# Patient Record
Sex: FEMALE | Race: WHITE | NOT HISPANIC OR LATINO | Employment: OTHER | URBAN - METROPOLITAN AREA
[De-identification: names, ages, dates, MRNs, and addresses within clinical notes are randomized per-mention and may not be internally consistent; named-entity substitution may affect disease eponyms.]

---

## 2017-08-14 ENCOUNTER — TRANSCRIBE ORDERS (OUTPATIENT)
Dept: ADMINISTRATIVE | Facility: HOSPITAL | Age: 52
End: 2017-08-14

## 2017-08-14 DIAGNOSIS — Z12.31 ENCOUNTER FOR SCREENING MAMMOGRAM FOR MALIGNANT NEOPLASM OF BREAST: Primary | ICD-10-CM

## 2017-10-23 ENCOUNTER — HOSPITAL ENCOUNTER (OUTPATIENT)
Dept: MAMMOGRAPHY | Facility: MEDICAL CENTER | Age: 52
Discharge: HOME | End: 2017-10-23
Payer: COMMERCIAL

## 2017-10-23 DIAGNOSIS — Z12.31 ENCOUNTER FOR SCREENING MAMMOGRAM FOR MALIGNANT NEOPLASM OF BREAST: ICD-10-CM

## 2017-10-23 PROCEDURE — G0202 SCR MAMMO BI INCL CAD: HCPCS

## 2017-10-31 ENCOUNTER — ALLSCRIPTS OFFICE VISIT (OUTPATIENT)
Dept: OTHER | Facility: OTHER | Age: 52
End: 2017-10-31

## 2017-11-10 ENCOUNTER — LAB CONVERSION - ENCOUNTER (OUTPATIENT)
Dept: OTHER | Facility: OTHER | Age: 52
End: 2017-11-10

## 2017-11-10 ENCOUNTER — GENERIC CONVERSION - ENCOUNTER (OUTPATIENT)
Dept: OTHER | Facility: OTHER | Age: 52
End: 2017-11-10

## 2017-11-10 LAB
BUN SERPL-MCNC: 17 MG/DL (ref 7–25)
BUN/CREA RATIO (HISTORICAL): NORMAL (CALC) (ref 6–22)
CALCIUM SERPL-MCNC: 9.4 MG/DL (ref 8.6–10.4)
CHLORIDE SERPL-SCNC: 103 MMOL/L (ref 98–110)
CO2 SERPL-SCNC: 30 MMOL/L (ref 20–31)
CREAT SERPL-MCNC: 0.89 MG/DL (ref 0.5–1.05)
EGFR AFRICAN AMERICAN (HISTORICAL): 87 ML/MIN/1.73M2
EGFR-AMERICAN CALC (HISTORICAL): 75 ML/MIN/1.73M2
GLUCOSE (HISTORICAL): 90 MG/DL (ref 65–99)
POTASSIUM SERPL-SCNC: 4.4 MMOL/L (ref 3.5–5.3)
SODIUM SERPL-SCNC: 140 MMOL/L (ref 135–146)
TSH SERPL DL<=0.05 MIU/L-ACNC: 3.25 MIU/L

## 2017-12-26 ENCOUNTER — HOSPITAL ENCOUNTER (INPATIENT)
Facility: HOSPITAL | Age: 52
LOS: 1 days | Discharge: HOME | DRG: 103 | End: 2017-12-27
Attending: EMERGENCY MEDICINE | Admitting: INTERNAL MEDICINE
Payer: COMMERCIAL

## 2017-12-26 ENCOUNTER — APPOINTMENT (EMERGENCY)
Dept: CT IMAGING | Facility: HOSPITAL | Age: 52
DRG: 103 | End: 2017-12-26
Payer: COMMERCIAL

## 2017-12-26 ENCOUNTER — GENERIC CONVERSION - ENCOUNTER (OUTPATIENT)
Dept: OTHER | Facility: OTHER | Age: 52
End: 2017-12-26

## 2017-12-26 DIAGNOSIS — R41.0 CONFUSION AND DISORIENTATION: ICD-10-CM

## 2017-12-26 DIAGNOSIS — IMO0001 PARESTHESIAS/NUMBNESS: Primary | ICD-10-CM

## 2017-12-26 DIAGNOSIS — R56.9 SEIZURE (HCC): ICD-10-CM

## 2017-12-26 DIAGNOSIS — R47.89 WORD FINDING DIFFICULTY: ICD-10-CM

## 2017-12-26 LAB
ANION GAP SERPL CALCULATED.3IONS-SCNC: 8 MMOL/L (ref 4–13)
ATRIAL RATE: 69 BPM
BASOPHILS # BLD AUTO: 0.01 THOUSANDS/ΜL (ref 0–0.1)
BASOPHILS NFR BLD AUTO: 0 % (ref 0–1)
BUN SERPL-MCNC: 9 MG/DL (ref 5–25)
CALCIUM SERPL-MCNC: 9.2 MG/DL (ref 8.3–10.1)
CHLORIDE SERPL-SCNC: 104 MMOL/L (ref 100–108)
CO2 SERPL-SCNC: 28 MMOL/L (ref 21–32)
CREAT SERPL-MCNC: 0.81 MG/DL (ref 0.6–1.3)
EOSINOPHIL # BLD AUTO: 0.04 THOUSAND/ΜL (ref 0–0.61)
EOSINOPHIL NFR BLD AUTO: 1 % (ref 0–6)
ERYTHROCYTE [DISTWIDTH] IN BLOOD BY AUTOMATED COUNT: 13.3 % (ref 11.6–15.1)
GFR SERPL CREATININE-BSD FRML MDRD: 84 ML/MIN/1.73SQ M
GLUCOSE SERPL-MCNC: 95 MG/DL (ref 65–140)
GLUCOSE SERPL-MCNC: 99 MG/DL (ref 65–140)
HCT VFR BLD AUTO: 39.3 % (ref 34.8–46.1)
HGB BLD-MCNC: 13.3 G/DL (ref 11.5–15.4)
LYMPHOCYTES # BLD AUTO: 1.62 THOUSANDS/ΜL (ref 0.6–4.47)
LYMPHOCYTES NFR BLD AUTO: 30 % (ref 14–44)
MCH RBC QN AUTO: 31.7 PG (ref 26.8–34.3)
MCHC RBC AUTO-ENTMCNC: 33.8 G/DL (ref 31.4–37.4)
MCV RBC AUTO: 94 FL (ref 82–98)
MONOCYTES # BLD AUTO: 0.3 THOUSAND/ΜL (ref 0.17–1.22)
MONOCYTES NFR BLD AUTO: 6 % (ref 4–12)
NEUTROPHILS # BLD AUTO: 3.43 THOUSANDS/ΜL (ref 1.85–7.62)
NEUTS SEG NFR BLD AUTO: 63 % (ref 43–75)
NRBC BLD AUTO-RTO: 0 /100 WBCS
P AXIS: 48 DEGREES
PLATELET # BLD AUTO: 198 THOUSANDS/UL (ref 149–390)
PMV BLD AUTO: 11.3 FL (ref 8.9–12.7)
POTASSIUM SERPL-SCNC: 4 MMOL/L (ref 3.5–5.3)
PR INTERVAL: 142 MS
QRS AXIS: 81 DEGREES
QRSD INTERVAL: 76 MS
QT INTERVAL: 396 MS
QTC INTERVAL: 424 MS
RBC # BLD AUTO: 4.19 MILLION/UL (ref 3.81–5.12)
SODIUM SERPL-SCNC: 140 MMOL/L (ref 136–145)
T WAVE AXIS: 39 DEGREES
VENTRICULAR RATE: 69 BPM
WBC # BLD AUTO: 5.4 THOUSAND/UL (ref 4.31–10.16)

## 2017-12-26 PROCEDURE — 80048 BASIC METABOLIC PNL TOTAL CA: CPT | Performed by: EMERGENCY MEDICINE

## 2017-12-26 PROCEDURE — 82948 REAGENT STRIP/BLOOD GLUCOSE: CPT

## 2017-12-26 PROCEDURE — 93005 ELECTROCARDIOGRAM TRACING: CPT

## 2017-12-26 PROCEDURE — 93005 ELECTROCARDIOGRAM TRACING: CPT | Performed by: EMERGENCY MEDICINE

## 2017-12-26 PROCEDURE — 99285 EMERGENCY DEPT VISIT HI MDM: CPT

## 2017-12-26 PROCEDURE — 85025 COMPLETE CBC W/AUTO DIFF WBC: CPT | Performed by: EMERGENCY MEDICINE

## 2017-12-26 PROCEDURE — 70450 CT HEAD/BRAIN W/O DYE: CPT

## 2017-12-26 PROCEDURE — 36415 COLL VENOUS BLD VENIPUNCTURE: CPT | Performed by: EMERGENCY MEDICINE

## 2017-12-26 RX ORDER — ASPIRIN 81 MG/1
324 TABLET, CHEWABLE ORAL ONCE
Status: COMPLETED | OUTPATIENT
Start: 2017-12-26 | End: 2017-12-26

## 2017-12-26 RX ORDER — ASPIRIN 81 MG/1
324 TABLET, CHEWABLE ORAL DAILY
Qty: 100 TABLET | Refills: 0 | Status: SHIPPED | OUTPATIENT
Start: 2017-12-26 | End: 2018-12-03 | Stop reason: SDUPTHER

## 2017-12-26 RX ORDER — ESTRADIOL 1 MG/1
1 TABLET ORAL DAILY
COMMUNITY

## 2017-12-26 RX ADMIN — ASPIRIN 81 MG 324 MG: 81 TABLET ORAL at 17:01

## 2017-12-26 NOTE — ED ATTENDING ATTESTATION
Angie Damon MD, saw and evaluated the patient  All available labs and X-rays were ordered by me or the resident and have been reviewed by myself  I discussed the patient with the resident / non-physician and agree with the resident's / non-physician practitioner's findings and plan as documented in the resident's / non-physician practicitioner's note, except where noted  At this point, I agree with the current assessment done in the ED  Chief Complaint   Patient presents with    Numbness     Pins and needles feeling in left arm since Sat  Felt like she had some trouble getting her words out on Sat  This is a 50-year-old female presenting for evaluation of left arm tingling sensation and questionable word-finding difficulties  The patient does have baseline mental retardation  The patient since Friday has been having very intermittent word-finding difficulties  It would be unrelated to stress, unrelated to exertion, unrelated to temperament  Because it was continuing until today with maybe some numbness in her left arm on Saturday alone all her symptoms have resolved but they called the family doctor who said that this could be a stroke and so go to the emergency room  The patient herself denies fevers chills nausea vomiting chest pain shortness of breath  Denies arm pain jaw pain back pain  Denies belly pain  Denies any urinary tract infection symptoms (burning, itching, pain, blood, frequency)  Denies any upper respiratory tract infection symptoms (cough, congestion, rhinorrhea, sore throat)  She says she has had these symptoms in the past secondary to a migraine and remembers having a very bad headache before but it has resolved  It was very similar to previous migraines     PMH:  - Migraines  - Raynaud's?  - Kidney stones  PSH:  - Hysterectomy  No smoking, drinking, drugs  PE:  Vitals:    12/27/17 0200 12/27/17 0751 12/27/17 1134 12/27/17 1500   BP: 138/71 135/79 134/93 134/96 Pulse: 66 78 82 87   Resp: 16 18 18 18   Temp: (!) 97 2 °F (36 2 °C) 97 9 °F (36 6 °C) 98 3 °F (36 8 °C) 97 9 °F (36 6 °C)   TempSrc: Temporal Tympanic Tympanic Tympanic   SpO2: 98% 99% 99% 100%   Weight:       Height:       General: VSS, NAD, awake, alert  Well-nourished, well-developed  Appears stated age  Head: Normocephalic, atraumatic, nontender  Eyes: PERRL, EOM-I  No diplopia  No hyphema  No subconjunctival hemorrhages  Symmetrical lids  ENT: Atraumatic external nose and ears  MMM  No malocclusion  No stridor  Normal phonation  No drooling  Normal swallowing  Base of mouth is soft  No mastoid tenderness  Neck: Symmetric, trachea midline  No JVD  No midline neck tenderness  CV: RRR  +S1/S2  No murmurs or gallops  Peripheral pulses +2 throughout  No chest wall tenderness  Lungs:   Unlabored No retractions  CTAB, lungs sounds equal bilateral    No crepitus  No tachypnea  No paradoxical motion  Abd: +BS, soft, NT/ND  No guarding  No rigidity  No rebound  No hepatosplenomegaly noted  No peritoneal signs  MSK:   FROM   No lower extremity edema  Back:   No CVAT  Skin: Dry, intact  Neuro: AAOx3, GCS 15, CN II-XII grossly intact  Motor grossly intact  Sensory grossly intact  EHL/FHL/PF/DF/KF/KE/HF/HE 5/5  No saddle anesthesia  M/U/R/A nerve sensation bilateral intact and equal    strength 5/5  Good capillary refill  2+ radial pulses, bilaterally equal    BICEPS/TRICEPS 5/5  Shoulder abduction/adduction 5/5  No pronator drift  No aphasia/dysarthria  CN 2-12 intact  Normal finger to nose  Normal rapid alternating movements of hands  No nystagmus  No facial droop     NIH Stroke Scale Score = 0  NIH Stroke Scale    Flowsheet Row Most Recent Value   Level of Consciousness (1a )  0 Filed at: 12/26/2017 2100   LOC Questions (1b )  0 Filed at: 12/26/2017 2100   LOC Commands (1c )  0 Filed at: 12/26/2017 2100   Best Gaze (2 )  0 Filed at: 12/26/2017 2100   Visual (3 )  0 Filed at: 12/26/2017 2100   Facial Palsy (4 )  0 Filed at: 12/26/2017 2100   Motor Arm, Left (5a )  1 Filed at: 12/26/2017 2100   Motor Arm, Right (5b )  0 Filed at: 12/26/2017 2100   Motor Leg, Left (6a )  0 Filed at: 12/26/2017 2100   Motor Leg, Right (6b )  0 Filed at: 12/26/2017 2100   Limb Ataxia (7 )  0 Filed at: 12/26/2017 2100   Sensory (8 )  0 Filed at: 12/26/2017 2100   Best Language (9 )  1 Filed at: 12/26/2017 2100   Dysarthria (10 )  0 Filed at: 12/26/2017 2100   Extinction and Inattention (11 ) (Formerly Neglect)  0 Filed at: 12/26/2017 2100   Total  2 Filed at: 12/26/2017 2100      Psychiatric/Behavioral: Appropriate mood and affect   Exam: deferred  A:  - Transient word finding difficulties, resolved  - Word finding difficulties, resolved  - HA, resolved  P:  - CTH, non-acute stroke workup  - Doesn't sound like a stroke to me  - [id number] + DC home with outpatient MRI through PCP vs admit for MRI to r/o stroke  - 13 point ROS was performed and all are normal unless stated in the history above  - Nursing note reviewed  Vitals reviewed  - Orders placed by myself and/or advanced practitioner / resident     - Previous chart was not reviewed  - No language barrier    - History obtained from mom dad patient  - There are limitations to the history obtained  Reasons ROS could not be obtained: patient has baseline MR  - Critical care time: Not applicable for this patient  - Patient does not need initiation of IV thrombolytics: NIHSS Score = 0; Last Known well was several days ago  Final Diagnosis:  1  Paresthesias/numbness    2  Word finding difficulty    3  Confusion and disorientation    4   Seizure [facility])        ED Course as of Dec 28 1917   Tue Dec 26, 2017   1545 Procedure Note: EKG  Date/Time: 12/26/17 3:45 PM   Performed by: Mango Chan by: Marilou Kaufman   Indications / Diagnosis: Numbness  ECG reviewed by me, the ED Provider: yes   The EKG demonstrates:  Rhythm: HR 69 normal sinus  Intervals: normal intervals  Axis: normal axis  QRS/Blocks: normal QRS  ST Changes: No acute ST Changes, no STD/MENA except TWI in 3      Medications   aspirin chewable tablet 324 mg (324 mg Oral Given 12/26/17 1701)   magnesium oxide (MAG-OX) tablet 400 mg (400 mg Oral Given 12/27/17 1224)     CT head without contrast   ED Interpretation   CT ordered by my resident and the report from radiologist has been reviewed  Final Result      Normal head CT except for chronic right maxillary mucosal disease  No acute findings           Workstation performed: [id number]           Orders Placed This Encounter   Procedures    ED ECG Documentation Only    CT head without contrast    CBC and differential    Basic metabolic panel    Basic metabolic panel    CBC (With Platelets)    Inpatient consult to Neurology    EKG RESULTS    ECG 12 lead    ECG 12 lead    Inpatient Admission (expected length of stay for this patient is greater than two midnights)     Labs Reviewed   CBC AND DIFFERENTIAL - Normal       Result Value Ref Range Status    WBC 5 40  4 31 - 10 16 Thousand/uL Final    RBC 4 19  3 81 - 5 12 Million/uL Final    Hemoglobin 13 3  11 5 - 15 4 g/dL Final    Hematocrit 39 3  34 8 - 46 1 % Final    MCV 94  82 - 98 fL Final    MCH 31 7  26 8 - 34 3 pg Final    MCHC 33 8  31 4 - 37 4 g/dL Final    RDW 13 3  11 6 - 15 1 % Final    MPV 11 3  8 9 - 12 7 fL Final    Platelets 764  863 - 390 Thousands/uL Final    nRBC 0  /100 WBCs Final    Neutrophils Relative 63  43 - 75 % Final    Lymphocytes Relative 30  14 - 44 % Final    Monocytes Relative 6  4 - 12 % Final    Eosinophils Relative 1  0 - 6 % Final    Basophils Relative 0  0 - 1 % Final    Neutrophils Absolute 3 43  1 85 - 7 62 Thousands/µL Final    Lymphocytes Absolute 1 62  0 60 - 4 47 Thousands/µL Final    Monocytes Absolute 0 30  0 17 - 1 22 Thousand/µL Final    Eosinophils Absolute 0 04  0 00 - 0 61 Thousand/µL Final    Basophils Absolute 0  01  0 00 - 0 10 Thousands/µL Final   BASIC METABOLIC PANEL    Sodium 799  136 - 145 mmol/L Final    Potassium 4 0  3 5 - 5 3 mmol/L Final    Chloride 104  100 - 108 mmol/L Final    CO2 28  21 - 32 mmol/L Final    Anion Gap 8  4 - 13 mmol/L Final    BUN 9  5 - 25 mg/dL Final    Creatinine 0 81  0 60 - 1 30 mg/dL Final    Comment: Standardized to IDMS reference method    Glucose 99  65 - 140 mg/dL Final    Comment:   If the patient is fasting, the ADA then defines impaired fasting glucose as > 100 mg/dL and diabetes as > or equal to 123 mg/dL  Specimen collection should occur prior to Sulfasalazine administration due to the potential for falsely depressed results  Specimen collection should occur prior to Sulfapyridine administration due to the potential for falsely elevated results  Calcium 9 2  8 3 - 10 1 mg/dL Final    eGFR 84  ml/min/1 73sq m Final    Narrative:     National Kidney Disease Education Program recommendations are as follows:  GFR calculation is accurate only with a steady state creatinine  Chronic Kidney disease less than 60 ml/min/1 73 sq  meters  Kidney failure less than 15 ml/min/1 73 sq  meters       Time reflects when diagnosis was documented in both MDM as applicable and the Disposition within this note     Time User Action Codes Description Comment    12/26/2017  4:44 PM Hannah Navarrete [R20 9] Paresthesias/numbness     12/26/2017  4:44 PM Jenny Schaefer [R20 9] Paresthesias/numbness Of left arm    12/26/2017  4:44 PM Susie Velasco Add [R47 89] Word finding difficulty     12/27/2017  5:08 AM Tyler Aguilar Add [F99] Confusion and disorientation     12/27/2017  5:08 AM Tyler Aguilar Add [R56 9] Seizure [facility])       ED Disposition     ED Disposition Condition Comment    Admit    Discussed with [facility], inpatient admission, Dr Anjel Cazares up With Specialties Details Why Contact Kadeem Raymond DO Family Medicine Call in 1 week([phone] [id number] [address] [place] [address]  [phone]      [facility] Follow up Followup  instructions on your discharge sheet [place] [id number] [phone]        Discharge Medication List as of 12/27/2017  4:37 PM      START taking these medications    Details   aspirin 81 mg chewable tablet Chew 4 tablets daily for 30 days, Starting Tue 12/26/2017, Until Thu 1/25/2018, Print      magnesium oxide (MAG-OX) 400 mg Take 1 tablet by mouth daily, Starting Wed 12/27/2017, Print         CONTINUE these medications which have NOT CHANGED    Details   aspirin 81 MG tablet Take 81 mg by mouth daily  , Until Discontinued, Historical Med      estradiol (ESTRACE) 1 mg tablet Take 1 mg by mouth daily, Historical Med           No discharge procedures on file  Prior to Admission Medications   Prescriptions Last Dose Informant Patient Reported? Taking?   aspirin 81 MG tablet   Yes Yes   Sig: Take 81 mg by mouth daily  estradiol (ESTRACE) 1 mg tablet   Yes Yes   Sig: Take 1 mg by mouth daily      Facility-Administered Medications: None       Portions of the record may have been created with voice recognition software  Occasional wrong word or "sound a like" substitutions may have occurred due to the inherent limitations of voice recognition software  Read the chart carefully and recognize, using context, where substitutions have occurred      Electronically signed by:  Soo Wood

## 2017-12-26 NOTE — ED NOTES
Patient transported to [facility] [address], RN  12/26/17 6473

## 2017-12-26 NOTE — ED PROVIDER NOTES
History  Chief Complaint   Patient presents with    Numbness     Pins and needles feeling in left arm since Sat  Felt like she had some trouble getting her words out on Sat  HPI  This is a 60-year-old female with history of questionable Raynaud's phenomenon and migraines presenting to the emergency room for left arm paresthesias as well as difficulty with word finding  Symptoms started for days ago  Patient says on Friday where suddenly she had some paresthesias of her left arm  The paresthesias affects her entire arm hand and fingers  Patient denies any weakness of the arm, however she says that her fingers do feel stiff  She says that she was not laying on her arm or having her arm in a bad position when it started  No trauma to the arm, she denies any pain of the arm  She says the paresthesias have persisted since then  On Saturday, about 3 days ago, she said she was having some difficulty with finding her words  There was no dysarthria, no slurring of speech, no facial droop  The parents are in the room and state that they did not notice slurred speech or any facial drooping  She did not have any symptoms of the all other extremities  Denies any headache, no visual changes, hearing changes  No recent illness, no chest pain shortness of breath, no abdominal pain, no nausea vomiting  Patient says that in the past, she had similar symptoms with her Raynaud's or is acting up, especially when it was cold  Patient did have some symptoms in her arm, however never persisted for this long before  She denies smoking, drinking drug use  Family history is unknown because she is adopted  Prior to Admission Medications   Prescriptions Last Dose Informant Patient Reported? Taking?   aspirin 81 MG tablet   Yes Yes   Sig: Take 81 mg by mouth daily     estradiol (ESTRACE) 1 mg tablet   Yes Yes   Sig: Take 1 mg by mouth daily      Facility-Administered Medications: None       Past Medical History: Diagnosis Date    Kidney stone     No known health problems     Raynaud's phenomenon        Past Surgical History:   Procedure Laterality Date    HYSTERECTOMY         History reviewed  No pertinent family history  I have reviewed and agree with the history as documented  Social History   Substance Use Topics    Smoking status: Never Smoker    Smokeless tobacco: Never Used    Alcohol use No        Review of Systems    Constitutional: Negative for appetite change, chills and fever  HENT: Negative for congestion, rhinorrhea and sore throat  Eyes: Negative for photophobia, pain and visual disturbance  Respiratory: Negative for cough, chest tightness and shortness of breath  Cardiovascular: Negative for chest pain, palpitations and leg swelling  Gastrointestinal: Negative for abdominal pain, diarrhea, nausea and vomiting  Genitourinary: Negative for dysuria, flank pain and hematuria  Musculoskeletal: Negative for back pain, neck pain and neck stiffness  Skin: Negative for color change, rash and wound  Neurological: Negative for dizziness, and headaches  Positive for numbness   All other systems reviewed and are negative      Physical Exam  ED Triage Vitals   Temperature Pulse Respirations Blood Pressure SpO2   12/26/17 1313 12/26/17 1313 12/26/17 1313 12/26/17 1313 12/26/17 1313   98 3 °F (36 8 °C) 77 15 166/87 98 %      Temp Source Heart Rate Source Patient Position - Orthostatic VS BP Location FiO2 (%)   12/26/17 1313 12/26/17 1547 12/26/17 1547 12/26/17 1547 --   Temporal Monitor Lying Right arm       Pain Score       12/26/17 1313       No Pain           Orthostatic Vital Signs  Vitals:    12/26/17 1755 12/26/17 1957 12/26/17 2100 12/26/17 2200   BP: 168/81 146/83 143/92 129/77   Pulse: 83 84 82 72   Patient Position - Orthostatic VS: Lying Lying Lying Lying       Physical Exam  /77   Pulse 72   Temp 98 6 °F (37 °C) (Temporal)   Resp 18   Ht 5' (1 524 m)   Wt 64 6 kg (142 lb 6 7 oz)   SpO2 96%   BMI 27 81 kg/m²     General Appearance:  Alert, cooperative, no distress, appears stated age   Head:  Normocephalic, without obvious abnormality, atraumatic   Eyes:  PERRL, conjunctiva/corneas clear, EOM's intact   Ears:  Normal TM's and external ear canals, both ears   Nose: Nares normal, septum midline,mucosa normal, no drainage or sinus tenderness   Throat: Lips, mucosa, and tongue normal; teeth and gums normal   Neck: Supple, symmetrical, trachea midline, no adenopathy, no midline tenderness, no signs of meningismus, full range of motion of head in all directions   Back:   Symmetric, no curvature, ROM normal, no CVA tenderness   Lungs:   Clear to auscultation bilaterally, respirations unlabored   Heart:  Regular rate and rhythm, S1 and S2 normal, no murmur, rub, or gallop   Abdomen:   Soft, non-tender, bowel sounds active all four quadrants   Pelvic: Deferred   Extremities: Extremities normal, atraumatic, no cyanosis or edema     Pulses: 2+ and symmetric   Skin: Skin color, texture, turgor normal, no rashes or lesions   Neurologic:              Psychiatric: Moves all extremities, sensation and strength in tact in all extremities  Left arm with 5/5 strength in the shoulder, elbow and wrist   Sensation intact to light touch bilaterally  Patient was unable to appreciate pinprick and either of the upper extremities    Finger-to-nose intact in both arms    Flat affect         ED Medications  Medications   aspirin chewable tablet 324 mg (324 mg Oral Given 12/26/17 1701)       Diagnostic Studies  Results Reviewed     Procedure Component Value Units Date/Time    Basic metabolic panel [[id number]] Collected:  12/26/17 1609    Lab Status:  Final result Specimen:  Blood from Arm, Left Updated:  12/26/17 1708     Sodium 140 mmol/L      Potassium 4 0 mmol/L      Chloride 104 mmol/L      CO2 28 mmol/L      Anion Gap 8 mmol/L      BUN 9 mg/dL      Creatinine 0 81 mg/dL      Glucose 99 mg/dL Calcium 9 2 mg/dL      eGFR 84 ml/min/1 73sq m     Narrative:         National Kidney Disease Education Program recommendations are as follows:  GFR calculation is accurate only with a steady state creatinine  Chronic Kidney disease less than 60 ml/min/1 73 sq  meters  Kidney failure less than 15 ml/min/1 73 sq  meters  CBC and differential [[id number]]  (Normal) Collected:  12/26/17 1609    Lab Status:  Final result Specimen:  Blood from Arm, Left Updated:  12/26/17 1614     WBC 5 40 Thousand/uL      RBC 4 19 Million/uL      Hemoglobin 13 3 g/dL      Hematocrit 39 3 %      MCV 94 fL      MCH 31 7 pg      MCHC 33 8 g/dL      RDW 13 3 %      MPV 11 3 fL      Platelets 459 Thousands/uL      nRBC 0 /100 WBCs      Neutrophils Relative 63 %      Lymphocytes Relative 30 %      Monocytes Relative 6 %      Eosinophils Relative 1 %      Basophils Relative 0 %      Neutrophils Absolute 3 43 Thousands/µL      Lymphocytes Absolute 1 62 Thousands/µL      Monocytes Absolute 0 30 Thousand/µL      Eosinophils Absolute 0 04 Thousand/µL      Basophils Absolute 0 01 Thousands/µL                  CT head without contrast   ED Interpretation by Kane Rivera MD (12/26 1627)   CT ordered by my resident and the report from radiologist has been reviewed  Final Result by Charletta Lefort, MD (12/26 1616)      Normal head CT except for chronic right maxillary mucosal disease  No acute findings           Workstation performed: [id number]               Procedures  ECG 12 Lead Documentation  Date/Time: 12/26/2017 4:09 PM  Performed by: Renetta Soliman by: RIVAS Granica     Indications / Diagnosis:  Stroke eval  ECG reviewed by me, the ED Provider: yes    Patient location:  ED  Previous ECG:     Previous ECG:  Unavailable  Interpretation:     Interpretation: normal    Rate:     ECG rate:  69    ECG rate assessment: normal    Rhythm:     Rhythm: sinus rhythm    Ectopy:     Ectopy: none    QRS:     QRS axis:  Normal QRS intervals:  Normal  Conduction:     Conduction: normal    ST segments:     ST segments:  Normal  T waves:     T waves: normal            Phone Consults  ED Phone Contact    ED Course  ED Course as of Dec 26 2359   Tue Dec 26, 2017   1629 I spokeWith the family and patient about the options of being admitted for stroke workup versus having an outpatient stroke workup  CT scan was negative, I updated the family about this  They will discuss it, will likely lean towards outpatient workup  65 Family would like to pursue outpatient workup  Will start patient on aspirin  1742 When given the discharge paperwork, the patient had recently become more somnolent and was hallucinating  She said that she saw grandparents standing by the current  She was somnolent but arousable by voice  She was a and O x3, moving all extremities, no changes in her neuro exam   Parents were uncomfortable taking patient home given her change in mental status  Will have patient admitted for stroke pathway  MDM   Patient with isolated neurological symptoms  Examination is unremarkable  Patient with possible small CVA versus peripheral neurologic etiology  Will get CT scan of the head, will check cardiac labs  Patient is out of the time window and with low NIHSS of 0, not a candidate for TPA      CritCare Time    Disposition  Final diagnoses:   Paresthesias/numbness - Of left arm   Word finding difficulty     Time reflects when diagnosis was documented in both MDM as applicable and the Disposition within this note     Time User Action Codes Description Comment    12/26/2017  4:44 PM March ARB Alert Add [R20 9] Paresthesias/numbness     12/26/2017  4:44 PM March ARB Alert Modify [R20 9] Paresthesias/numbness Of left arm    12/26/2017  4:44 PM March ARB Alert Add [R47 89] Word finding difficulty       ED Disposition     ED Disposition Condition Comment    Admit    Discussed with DIANA SUN, inpatient admission, Dr Pastora Mandel Information     Follow up With Specialties Details Why Contact Info    Jeffrey Caro DO Family Medicine Call in 1 day To get follow-up MRI and echo [address]  [address] Neurology Call in 1 day  [facility] [id number] [phone]    Infolink  Call in 1 day To ask for [facility] [address] [facility]  Call in 1 day  [address] [address], [facility]        Current Discharge Medication List      START taking these medications    Details   aspirin 81 mg chewable tablet Chew 4 tablets daily for 30 days  Qty: 100 tablet, Refills: 0         CONTINUE these medications which have NOT CHANGED    Details   aspirin 81 MG tablet Take 81 mg by mouth daily  estradiol (ESTRACE) 1 mg tablet Take 1 mg by mouth daily           No discharge procedures on file  ED Provider  Attending physically available and evaluated Priyanka Unruly MONTENEGRO managed the patient along with the ED Attending      Electronically Signed by         Mitra Jefferson MD  Resident  12/26/17 0195

## 2017-12-27 VITALS
WEIGHT: 142.42 LBS | OXYGEN SATURATION: 100 % | TEMPERATURE: 97.9 F | RESPIRATION RATE: 18 BRPM | DIASTOLIC BLOOD PRESSURE: 96 MMHG | HEIGHT: 60 IN | HEART RATE: 87 BPM | BODY MASS INDEX: 27.96 KG/M2 | SYSTOLIC BLOOD PRESSURE: 134 MMHG

## 2017-12-27 PROBLEM — R20.0 LUE NUMBNESS: Status: ACTIVE | Noted: 2017-12-27

## 2017-12-27 PROBLEM — R56.9 SEIZURE (HCC): Status: ACTIVE | Noted: 2017-12-27

## 2017-12-27 PROBLEM — R51.9 HEADACHE: Status: ACTIVE | Noted: 2017-12-27

## 2017-12-27 PROBLEM — R41.0 CONFUSION AND DISORIENTATION: Status: ACTIVE | Noted: 2017-12-27

## 2017-12-27 LAB
ANION GAP SERPL CALCULATED.3IONS-SCNC: 9 MMOL/L (ref 4–13)
BUN SERPL-MCNC: 8 MG/DL (ref 5–25)
CALCIUM SERPL-MCNC: 8.8 MG/DL (ref 8.3–10.1)
CHLORIDE SERPL-SCNC: 105 MMOL/L (ref 100–108)
CO2 SERPL-SCNC: 27 MMOL/L (ref 21–32)
CREAT SERPL-MCNC: 0.65 MG/DL (ref 0.6–1.3)
ERYTHROCYTE [DISTWIDTH] IN BLOOD BY AUTOMATED COUNT: 13.6 % (ref 11.6–15.1)
GFR SERPL CREATININE-BSD FRML MDRD: 103 ML/MIN/1.73SQ M
GLUCOSE SERPL-MCNC: 98 MG/DL (ref 65–140)
HCT VFR BLD AUTO: 39.9 % (ref 34.8–46.1)
HGB BLD-MCNC: 13.4 G/DL (ref 11.5–15.4)
MCH RBC QN AUTO: 31.6 PG (ref 26.8–34.3)
MCHC RBC AUTO-ENTMCNC: 33.6 G/DL (ref 31.4–37.4)
MCV RBC AUTO: 94 FL (ref 82–98)
PLATELET # BLD AUTO: 199 THOUSANDS/UL (ref 149–390)
PMV BLD AUTO: 11.7 FL (ref 8.9–12.7)
POTASSIUM SERPL-SCNC: 3.7 MMOL/L (ref 3.5–5.3)
RBC # BLD AUTO: 4.24 MILLION/UL (ref 3.81–5.12)
SODIUM SERPL-SCNC: 141 MMOL/L (ref 136–145)
WBC # BLD AUTO: 6.27 THOUSAND/UL (ref 4.31–10.16)

## 2017-12-27 PROCEDURE — 85027 COMPLETE CBC AUTOMATED: CPT | Performed by: PHYSICIAN ASSISTANT

## 2017-12-27 PROCEDURE — 80048 BASIC METABOLIC PNL TOTAL CA: CPT | Performed by: PHYSICIAN ASSISTANT

## 2017-12-27 RX ORDER — ACETAMINOPHEN 325 MG/1
650 TABLET ORAL EVERY 6 HOURS PRN
Status: DISCONTINUED | OUTPATIENT
Start: 2017-12-27 | End: 2017-12-27 | Stop reason: HOSPADM

## 2017-12-27 RX ORDER — ASPIRIN 81 MG/1
81 TABLET, CHEWABLE ORAL DAILY
Status: DISCONTINUED | OUTPATIENT
Start: 2017-12-27 | End: 2017-12-27 | Stop reason: HOSPADM

## 2017-12-27 RX ORDER — HEPARIN SODIUM 5000 [USP'U]/ML
5000 INJECTION, SOLUTION INTRAVENOUS; SUBCUTANEOUS EVERY 8 HOURS SCHEDULED
Status: DISCONTINUED | OUTPATIENT
Start: 2017-12-27 | End: 2017-12-27 | Stop reason: HOSPADM

## 2017-12-27 RX ORDER — ESTRADIOL 1 MG/1
1 TABLET ORAL DAILY
Status: DISCONTINUED | OUTPATIENT
Start: 2017-12-27 | End: 2017-12-27 | Stop reason: HOSPADM

## 2017-12-27 RX ADMIN — HEPARIN SODIUM 5000 UNITS: 5000 INJECTION, SOLUTION INTRAVENOUS; SUBCUTANEOUS at 05:55

## 2017-12-27 RX ADMIN — ASPIRIN 81 MG 81 MG: 81 TABLET ORAL at 08:36

## 2017-12-27 RX ADMIN — ESTRADIOL 1 MG: 1 TABLET ORAL at 08:36

## 2017-12-27 RX ADMIN — Medication 400 MG: at 12:24

## 2017-12-27 NOTE — ASSESSMENT & PLAN NOTE
Manifested as word finding  Appreciate neuro eval, LUE numbness like CTS, seizure like activity or likely psychogenic  Word finding Typically associated w/HAs  No active HA but typically does occur associated w/HAs as outlined by neurology  No further workup for stroke given nonfocal exam  Stable for d/c

## 2017-12-27 NOTE — CONSULTS
Consultation - Neurology   Martin Valencia 47 y o  female MRN: [id number]  Unit/Bed#: E4 -01 Encounter: [id number]      Assessment/Plan   Assessment:  1  Left upper extremity paresthesias following the path of the median nerve, consistent with median nerve entrapment or carpal tunnel syndrome  Likely secondary to recent overuse  2  Severe headaches associated with nausea and vomiting, occasional word-finding, previously diagnosed as migraines  3   Otherwise nonfocal neurologic exam, not suggestive of stroke  4   Seizure like activity seen in the ED in the context of pain and anxiety  2 prior episodes have also been in the context of pain and anxiety  Given the presence of preserved consciousness with recall of bilateral arm and leg movements, this likely represents an anxiety reaction or psychogenic seizure  EEG in 2014 for similar event was unrevealing for epileptiform activity  Plan:  1  Begin scheduled nonsteroidal anti-inflammatories daily for 7-10 days  Over-the-counter wrist splint on the left at night and during times of repetitive activity (prolonged hand writing)  If persists, can pursue EMG as an outpatient  2  No acute headache treatment needed at this time  With occurrences 2 per month, would limit preventative headache treatment to magnesium supplement daily  3   No additional stroke workup  Do not see indication for MRI brain at this time  4  No repeat seizure workup unless events recur outside of the setting of pain and anxiety      History of Present Illness     Physician Requesting Consult: Leanna Rocha MD  Reason for Consult / Principal Problem:  Paresthesias and numbness, confusion and disorientation, seizures  Hx and PE limited by:  Occasionally contradictory historian    HPI: Martin Valencia is a 47y o  year old left handed female who presented to the emergency department at [facility] in [facility] on 12/26 2017 at 1505 hours complaining of pins and needles in the left upper extremity for 5 days  She had contacted her PCP and was directed to the ED  Subsequently, she reported having difficulty getting her words out, which was not association with a bad migraine that occurred on 12/24  She did not have a headache on presentation to the ED  She was afebrile, vital signs were stable with only modest elevation in blood pressure (852/78) with a systolic reading dropping into the 130s to 140 is without treatment  Head CT was unremarkable  Plans were to discharge the patient for an outpatient stroke workup, when the patient developed seizure-like activity of her arms and legs, i e  jerking, arm stiffness  She was also reported to be somnolent and hallucinating (seeing her grandparents in the room)  Rapid response was called, who noted that her eyes were open and she was verbal during these episodes  She had 4 events of these movements, varying from 15-60 seconds  At that point she was admitted for further evaluation  Patient is able to provide fairly accurate history  She has a history of migraines, which began when she was in her mid 35s undergoing menopause  She describes the headaches as global, Teresa Del my head will explode," associated with nausea and vomiting as well as scalp numbness  She denies associated photophobia or phonophobia, no photopsias  These headaches can occur up to 1-2 times per month, and can last up to 1 week at a time  She developed a severe migraine at the end of last week, that was associated with difficulty talking  She has a history of 1 prior similar episode  She treats her migraines with Advil, rest, and ice packs  She admits to 2 developing a severe migraine while in the ED, or after admission  It was with the sudden onset of headache that she reports that shaking movements began  She states vision and hearing was intact throughout the episode, she was aware of bilateral shaking movements    She acknowledges feelings of anxiety during her ED stay  She has experienced episodes like this twice in the past:  Once after emerging from anesthesia after her hysterectomy, and once in the ED in 2016 when she was experiencing severe abdominal pain associated with kidney stone  She is left handed, and reports she recently wrote 60 [place] cards, suggesting overuse of her left arm and hand  On 12/22, she developed pins and needle sensation along the inner aspect of the left arm, radiating from the wrist up towards the axilla  The paresthesias are intermittent, and she cannot and identify any triggers or alleviating factors  She denies weakness associated with the paresthesias     At the time of this exam, she is headache free, and denies any paresthesias  Inpatient consult to Neurology  Consult performed by: Amalia Mccormick ordered by: Edvin Bobby          Review of Systems   HENT: Negative for trouble swallowing  Eyes: Negative for visual disturbance  Respiratory: Positive for chest tightness (12/25)  Cardiovascular: Negative for palpitations  Gastrointestinal: Positive for nausea  Musculoskeletal: Negative for gait problem, neck pain and neck stiffness  Skin: Positive for color change (Bilateral hand redness, cold hands and feet)  Neurological: Positive for tremors, seizures, speech difficulty, numbness and headaches  Negative for dizziness, weakness and light-headedness  Psychiatric/Behavioral: The patient is nervous/anxious  All other systems reviewed and are negative        Historical Information   Past Medical History:   Diagnosis Date    Confusion and disorientation 12/27/2017    Endometriosis     Kidney stone     Learning disabilities     Migraine headache     Raynaud's phenomenon     Seizure ([facility] [address] ) 12/27/2017    Syncope 2014     Past Surgical History:   Procedure Laterality Date    HYSTERECTOMY       Social History   History   Alcohol Use No     History   Drug Use No     History Smoking Status    Never Smoker   Smokeless Tobacco    Never Used     Family History:   Family History   Problem Relation Age of Onset    Adopted: Yes       Review of previous medical records was  completed  Meds/Allergies   current meds:   Current Facility-Administered Medications   Medication Dose Route Frequency    acetaminophen (TYLENOL) tablet 650 mg  650 mg Oral Q6H PRN    aspirin chewable tablet 81 mg  81 mg Oral Daily    estradiol (ESTRACE) tablet 1 mg  1 mg Oral Daily    heparin (porcine) subcutaneous injection 5,000 Units  5,000 Units Subcutaneous Q8H [address]    and PTA meds:   Prior to Admission Medications   Prescriptions Last Dose Informant Patient Reported? Taking?   aspirin 81 MG tablet   Yes Yes   Sig: Take 81 mg by mouth daily  estradiol (ESTRACE) 1 mg tablet   Yes Yes   Sig: Take 1 mg by mouth daily      Facility-Administered Medications: None       Allergies   Allergen Reactions    Bee Venom     Morphine     Penicillins        Objective   Vitals:Blood pressure 135/79, pulse 78, temperature 97 9 °F (36 6 °C), temperature source Tympanic, resp  rate 18, height 5' (1 524 m), weight 64 6 kg (142 lb 6 7 oz), SpO2 99 %  ,Body mass index is 27 81 kg/m²  Intake/Output Summary (Last 24 hours) at 12/27/17 8718  Last data filed at 12/27/17 0836   Gross per 24 hour   Intake                0 ml   Output              750 ml   Net             -750 ml       Physical Exam   Constitutional: She is oriented to person, place, and time  She appears well-developed and well-nourished  HENT:   Head: Normocephalic and atraumatic  Mouth/Throat: Oropharynx is clear and moist    Eyes: Conjunctivae and EOM are normal  Pupils are equal, round, and reactive to light  No scleral icterus  Neck: Normal range of motion  Neck supple  Carotid bruit is not present  Cardiovascular: Normal rate, regular rhythm, normal heart sounds and intact distal pulses  No murmur heard    Pulmonary/Chest: Effort normal and breath sounds normal    Abdominal: Soft  Bowel sounds are normal    Musculoskeletal: Normal range of motion  She exhibits no edema, tenderness or deformity  Neurological: She is oriented to person, place, and time  She has normal strength  She has a normal Finger-Nose-Finger Test, a normal Heel to [employer] and a normal Romberg Test  Gait normal    Reflex Scores:       Tricep reflexes are 2+ on the right side and 2+ on the left side  Bicep reflexes are 2+ on the right side and 2+ on the left side  Brachioradialis reflexes are 2+ on the right side and 2+ on the left side  Patellar reflexes are 2+ on the right side and 2+ on the left side  Achilles reflexes are 2+ on the right side and 2+ on the left side  Skin: Skin is dry  She is not diaphoretic  There is erythema  Cool hands and feet, mild erythema in both hands, mild loss of skin creases over the joints of the fingers   Psychiatric: She has a normal mood and affect  Her speech is normal and behavior is normal  Thought content normal    Vitals reviewed  Neurologic Exam     Mental Status   Oriented to person, place, and time  Speech: speech is normal   Level of consciousness: alert  Knowledge: consistent with education and good  Able to name object  Normal comprehension  Cranial Nerves     CN II   Visual fields full to confrontation  CN III, IV, VI   Pupils are equal, round, and reactive to light  Extraocular motions are normal    Right pupil: Size: 3 mm  Shape: regular  Left pupil: Size: 3 mm  Shape: regular  Reactivity: sluggish  Nystagmus location: Slight end gaze nystagmus bilaterally  Conjugate gaze: present    CN V   Facial sensation intact  CN VII   Facial expression full, symmetric     Right facial weakness: none  Left facial weakness: none    CN VIII   CN VIII normal    Hearing: intact (To finger rub)    CN IX, X   CN IX normal    CN X normal    Palate: symmetric    CN XI   CN XI normal    Right sternocleidomastoid strength: normal  Left sternocleidomastoid strength: normal  Right trapezius strength: normal  Left trapezius strength: normal    CN XII   CN XII normal    Tongue deviation: none  Unable to visualize optic discs  Some impersistence with visual pursuits     Motor Exam   Muscle bulk: normal  Overall muscle tone: normal  Right arm pronator drift: absent  Left arm pronator drift: absent    Strength   Strength 5/5 throughout  Sensory Exam   Light touch normal    Vibration normal    Pinprick normal    Temperature was evaluated more proximally due to cold hands and feet, and was symmetric    Tinel's and Phalen signs were positive in the left arm     Gait, Coordination, and Reflexes     Gait  Gait: normal (Limited arm swing)    Coordination   Romberg: negative  Finger to nose coordination: normal  Heel to shin coordination: normal    Tremor   Resting tremor: absent  Intention tremor: absent  Action tremor: absent    Reflexes   Right brachioradialis: 2+  Left brachioradialis: 2+  Right biceps: 2+  Left biceps: 2+  Right triceps: 2+  Left triceps: 2+  Right patellar: 2+  Left patellar: 2+  Right achilles: 2+  Left achilles: 2+  Right : 2+  Left : 2+  Right plantar: normal  Left plantar: normal  Right ankle clonus: absent  Left ankle clonus: absentFine motor repetitive movements were symmetric and brisk except for left finger taps which were slower and required more effort  No cerebellar rebound             Lab Results:   CBC:   Results from last 7 days  Lab Units 12/27/17  0554 12/26/17  1609   WBC Thousand/uL 6 27 5 40   RBC Million/uL 4 24 4 19   HEMOGLOBIN g/dL 13 4 13 3   HEMATOCRIT % 39 9 39 3   MCV fL 94 94   PLATELETS Thousands/uL 199 198   , BMP/CMP:   Results from last 7 days  Lab Units 12/27/17  0554 12/26/17  1609   SODIUM mmol/L 141 140   POTASSIUM mmol/L 3 7 4 0   CHLORIDE mmol/L 105 104   CO2 mmol/L 27 28   ANION GAP mmol/L 9 8   BUN mg/dL 8 9   CREATININE mg/dL 0 65 0 81 GLUCOSE RANDOM mg/dL 98 99   CALCIUM mg/dL 8 8 9 2   EGFR ml/min/1 73sq m 103 84    11/2017 TSH 3 25  11/2016 FLP: , TG 99, HD 67,     Imaging Studies: I have personally reviewed pertinent reports  and I have personally reviewed pertinent films in PACS   CT head:  No intracranial mass, mass effect or midline shift  No CT signs of acute infarction  There is no parenchymal hemorrhage  VENTRICLES AND EXTRA-AXIAL SPACES:  Normal for patient's age  [facility]: (reports only) workup obtained for shaking movements, inability to speak when awakening in PACU after lap hysterectomy)  2014 MRI brain nl  2014 EEG nl    EKG, Pathology, and Other Studies: I have personally reviewed pertinent reports       EKG:Normal sinus rhythm Normal ECG No previous ECGs available        VTE Prophylaxis: Sequential compression device (Venodyne)  and Heparin

## 2017-12-27 NOTE — ASSESSMENT & PLAN NOTE
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

## 2017-12-27 NOTE — DISCHARGE INSTRUCTIONS
1  Your left arm symptoms are most likely due to carpal tunnel syndrome (inflammation around the wrist bones causing compression of the nerve)     1st, try to reduce the inflammation:       Take Advil 400 mg (2 tablets from your over-the-counter bottle) daily for 7-10 days  Please this with food as this may upset your stomach  Obtain a wrist splint (can be purchased in the drugstore) and where every night for sleep, as well as in the daytime when repetitively using your wrist (IE writing)    If symptoms do not resolve in several weeks, please ask your family doctor to refer you to the Neurology office for EMG and repeat evaluation        2  You may continue to take Advil 200 mg (1 tablets) if needed for headache  To help reduce the frequency and intensity of headaches, please take a magnesium supplement daily, which you can buy in the vitamin aisle at the drugstore

## 2017-12-27 NOTE — DISCHARGE SUMMARY
Discharge- Sherlie Kocher 1965, 47 y o  female MRN: [id number]    Unit/Bed#: E4 -01 Encounter: [id number]    Primary Care Provider: Glendy Limon DO   Date and time admitted to hospital: 12/26/2017  3:05 PM          Discharge Summary - [facility]  Internal Medicine    Patient Information: Sherlie Kocher 47 y o  female MRN: [id number]  Unit/Bed#: E4 -01 Encounter: [id number]    Discharging Physician / Practitioner: Santiago Cuenca PA-C  PCP: Glendy Limon DO  Admission Date: 12/26/2017  Discharge Date: 12/27/17    Reason for Admission: r/o CVA    Discharge Diagnoses:     Principal Problem:    Confusion and disorientation  Active Problems:    Seizure-like activity (HCC)    LUE numbness    Headache  Resolved Problems:    * No resolved hospital problems  *      Consultations During Hospital Stay:  · neuro    Procedures Performed:     ·     Significant Findings / Test Results:     INDICATION:  Left upper extremity paresthesias and word finding difficulty      COMPARISON:  None      TECHNIQUE:  CT examination of the brain was performed  In addition to axial images, coronal reformatted images were created and submitted for interpretation        Radiation dose length product (DLP) for this visit:  1048 mGy-cm   This examination, like all CT scans performed in the [facility], was performed utilizing techniques to minimize radiation dose exposure, including the use of iterative   reconstruction and automated exposure control        IMAGE QUALITY:  Diagnostic      FINDINGS:     PARENCHYMA:  No intracranial mass, mass effect or midline shift  No CT signs of acute infarction  There is no parenchymal hemorrhage           VENTRICLES AND EXTRA-AXIAL SPACES:  Normal for patient's age      VISUALIZED ORBITS AND PARANASAL SINUSES:  21 mm mucus retention cyst in the right maxillary sinus  No acute air-fluid levels in the paranasal sinuses  No mastoid effusions    Orbits are normal      CALVARIUM AND EXTRACRANIAL SOFT TISSUES:   Normal      IMPRESSION:     Normal head CT except for chronic right maxillary mucosal disease  No acute findings         Workstation performed: [id number]    Incidental Findings:   ·      Test Results Pending at Discharge (will require follow up):   ·      Outpatient Tests Requested:  ·     Complications:  none    Hospital Course:     Jr Brown is a 47 y o  female patient who originally presented to the hospital on 12/26/2017 due to LUE paresthesias and word finding  The patient has paresthesias had been present for 4 days had word-finding 3 days ago prior to admission  Prior to admission she was having a is somewhat prolonged headache with with associated word finding at that time  She was not seen in the ED after she reported her complaint of left upper extremity in a week numbness to her primary care provider  She was evaluated and had a CT head which was negative for any underlying  hemorrhage, ischemia, midline shift or mass  She has minimal risk factors for stroke  Unfortunately prior to discharge from the ED for outpatient workup, the patient had seizure-like activity and was seen by critical care and then by Neurology  As the patient remained conscious and was able to describe in detail the experienced she had, this is felt to be likely psychogenic and the patient has an outpatient EEG in 2014 for similar phenomenon concerning for pseudo seizure  No medications were recommended to be initiated that time or at this time for for these events  No further workup was recommended unless these events were to occur outside of anxiety in acute pain  For headaches, as these occur only 1-2 times a months was recommended magnesium oxide for prophylaxis    Left upper extremity numbness was felt to be secondary to CTS as was median nerve distribution and the patient admitted to [place] over 60 letters for Toni and that she typically does write letters as well as due pedal exercises with her arms to help her circulation  She is recommended over the counter wrist brace for her left hand and scheduled ibuprofen for the next 7-10 days  No further workup for stroke was recommended  If the patient's LUE paresthesias persist or worsen despite nsaids/rest she may benefit from an OP EMG  Pt is stable for d/c        Headache   Assessment & Plan    Appreciate neuro recommendations  Will d/c on magnesium oxide 400mg daily for prophylaxis given infrequency of headaches no other pharmacotherapy indicated at this time          LUE numbness   Assessment & Plan    CT negative for stroke  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 prior to [place]  Appreciate neuro eval this is in median nerve distribution concerning for CTS  Discussed need for OTC wrist brace and ibuprofen for pain control and consideration of outpatient EMG if continues in severity        Seizure-like activity ([address] )   Assessment & Plan    Pt remained awake and aware of activity in UE b/l, this is likely psychogenic as pt was very anxious  Previous EEG during comparable episode in 2014 negative for epileptiform spikes concerning for likely pseudoseizure  If s/sx occur without anxiety or pain would need  Further workup at that time        * Confusion and disorientation   Assessment & Plan    Manifested as word finding  Appreciate neuro eval  Likely psychogenic               Condition at Discharge: good     Discharge Day Visit / Exam:     Subjective:  Pt reports that she is doing better  Her numbness is stable in her LUE  She has no new numbness/weakness  No slurred speech or blurry vision  Her headache has resolved  She is anxious to go home and be with her parents with whom she lives    Vitals: Blood Pressure: 134/96 (12/27/17 1500)  Pulse: 87 (12/27/17 1500)  Temperature: 97 9 °F (36 6 °C) (12/27/17 1500)  Temp Source: Tympanic (12/27/17 1500)  Respirations: 18 (12/27/17 1500)  Height: 5' (152 4 cm) (12/26/17 2100)  Weight - Scale: 64 6 kg (142 lb 6 7 oz) (12/26/17 2100)  SpO2: 100 % (12/27/17 1500)  Exam:   Physical Exam   Constitutional: She appears well-developed  No distress  HENT:   Head: Normocephalic and atraumatic  Right Ear: External ear normal    Left Ear: External ear normal    Eyes: Conjunctivae are normal  Right eye exhibits no discharge  Left eye exhibits no discharge  Neck: Normal range of motion  Cardiovascular: Normal rate, regular rhythm, normal heart sounds and intact distal pulses  Exam reveals no gallop and no friction rub  No murmur heard  Pulmonary/Chest: Effort normal  No respiratory distress  She has no wheezes  She has no rales  Abdominal: She exhibits no distension  There is no tenderness  There is no rebound and no guarding  Musculoskeletal: She exhibits no edema  Neurological: She is alert  Skin: Skin is warm and dry  She is not diaphoretic  Psychiatric: She has a normal mood and affect  Vitals reviewed  Discussion with Family:     Discharge instructions/Information to patient and family:   See after visit summary for information provided to patient and family  Provisions for Follow-Up Care:  See after visit summary for information related to follow-up care and any pertinent home health orders  Disposition:     Home    For Discharges to [facility] SNF:   · Not Applicable to this Patient - Not Applicable to this Patient    Planned Readmission:      Discharge Statement:  I spent 40 minutes discharging the patient  This time was spent on the day of discharge  I had direct contact with the patient on the day of discharge  Greater than 50% of the total time was spent examining patient, answering all patient questions, arranging and discussing plan of care with patient as well as directly providing post-discharge instructions    Additional time then spent on discharge activities  Discharge Medications:  See after visit summary for reconciled discharge medications provided to patient and family        ** Please Note: This note has been constructed using a voice recognition system **

## 2017-12-27 NOTE — PROGRESS NOTES
Spoke to the patients mother  When the patient had her hysterectomy 3 years ago, she had a similar "seizure like episode" and then again in the ED last year after receiving morphine for a procedure  These episodes were reportedly "psuedoseizure" events  She was never placed on seizure medication and does not exhibit these behaviors at home  We will continue with conservative management for now with frequent neuro checks and re-assurance   Will defer further workup to the primary team and neurology

## 2017-12-27 NOTE — RAPID RESPONSE
Progress Note - Rapid Response   Edis Oates 47 y o  female MRN: [id number]    Time Called ( Time): 0864  Room#: 562  [id number] Time ( Time): 9672  Event End Time ( Time): 2872  MEWS score at time of Rapid Response: unknown  Primary reason for call: Acute change in mental status  Interventions:  Airway/Breathing:  No Intervention  Circulation: N/A  Other Treatments: N/A       Assessment:   1  ? Pseudoseizure, doubt tonic/clonic seizure  · Frequent neuro checks and frequent reassurance  · Neurology evaluation  · Would hold antiepileptics for now  · Continue monitoring on [address] for now         HPI/Chief Complaint (Background/Situation): Rapid response called for patient noted to have jerking movements of her arms and legs by the nursing staff  She was placed on her side and a rapid response was called  On my arrival, the patient had her eyes open, both arms were stiff at her sides  She was able to vocalize  She states that she doesn't know what happened  She denies dizziness, lightheadedness or weakness  She does not know whether this has ever happened to her in the past  Following the first episode, the nurse noted flapping of her right arm which lasted approximately 30 seconds  After this event, the patient was able to vocalize  She became teary eyed, not knowing what was happening  The patient does have a known cognitive delay at baseline, though her baseline mental status is not entirely known  Historical Information   Past Medical History:   Diagnosis Date    Kidney stone     No known health problems     Raynaud's phenomenon      Past Surgical History:   Procedure Laterality Date    HYSTERECTOMY       Social History   History   Alcohol Use No     History   Drug Use No     History   Smoking Status    Never Smoker   Smokeless Tobacco    Never Used     Family History: non-contributory    Meds/Allergies         No current facility-administered medications for this encounter  Allergies   Allergen Reactions    Bee Venom     Morphine     Penicillins        ROS: Negative except stiffness in her upper extremities    Physical Exam:  Gen: Awake, stiff upper extremities   HEENT:PERRL   Neck:supple, negative for lymphadenopathy   Chest:CTA  Cor:RRR  Abd:Soft, non tender  Ext: Stiff upper extremities against resistence  Neuro:awake, speech is deliberate but clear  There is no facial droops, her strength is 5/5  She is stiff against resistance, but with command is able to relax her extremities  Skin: Warm and dry    No intake or output data in the 24 hours ending 12/26/17 2306    Respiratory    Lab Data (Last 4 hours)    None         O2/Vent Data (Last 4 hours)    None              Invasive Devices          No matching active lines, drains, or airways          DIAGNOSTIC DATA:    Lab: I have personally reviewed pertinent lab results  CBC:     Results from last 7 days  Lab Units 12/26/17  1609   WBC Thousand/uL 5 40   HEMOGLOBIN g/dL 13 3   HEMATOCRIT % 39 3   PLATELETS Thousands/uL 198     CMP:     Results from last 7 days  Lab Units 12/26/17  1609   SODIUM mmol/L 140   POTASSIUM mmol/L 4 0   CHLORIDE mmol/L 104   CO2 mmol/L 28   BUN mg/dL 9   CREATININE mg/dL 0 81   CALCIUM mg/dL 9 2   GLUCOSE RANDOM mg/dL 99     PT/INR:   No results found for: PT, INR,   Magnesium: No components found for: MAG,   Phosphorous: No results found for: PHOS    Microbiology:  No results found for: Rober Quarles, SPUTUMCULTUR      OUTCOME:   Stayed in room   Family notified of transfer:   Family member contacted: mother  Code Status: No Order  Critical Care Time: Total Critical Care time spent 35minutes excluding procedures, teaching and family updates

## 2017-12-27 NOTE — CASE MANAGEMENT
Initial Clinical Review    Admission: Date/Time/Statement: 12/26/17 @ 1743     Orders Placed This Encounter   Procedures    Inpatient Admission (expected length of stay for this patient is greater than two midnights)     Standing Status:   Standing     Number of Occurrences:   1     Order Specific Question:   Admitting Physician     Answer:   Joyce Stein [[id number]]     Order Specific Question:   Level of Care     Answer:   Med Surg [[id number]]     Order Specific Question:   Bed Type     Answer:   Suzanne [[id number]]     Order Specific Question:   Estimated length of stay     Answer:   More than 2 Midnights     Order Specific Question:   Certification     Answer:   I certify that inpatient services are medically necessary for this patient for a duration of greater than two midnights  See H&P and MD Progress Notes for additional information about the patient's course of treatment  ED: Date/Time/Mode of Arrival:   ED Arrival Information     Expected Arrival Acuity Means of Arrival Escorted By Service Admission Type    - 12/26/2017 13:09 Urgent Walk-In Self General Medicine Urgent    Arrival Complaint    Numbness        Chief Complaint:   Chief Complaint   Patient presents with    Numbness     Pins and needles feeling in left arm since Sat  Felt like she had some trouble getting her words out on Sat  History of Illness:  60-year-old female with history of questionable Raynaud's phenomenon and migraines presenting to the emergency room for left arm paresthesias as well as difficulty with word finding  Symptoms started for days ago  Patient says on Friday where suddenly she had some paresthesias of her left arm  The paresthesias affects her entire arm hand and fingers  Patient denies any weakness of the arm, however she says that her fingers do feel stiff  She says that she was not laying on her arm or having her arm in a bad position when it started  No trauma to the arm, she denies any pain of the arm    She says the paresthesias have persisted since then  On Saturday, about 3 days ago, she said she was having some difficulty with finding her words  There was no dysarthria, no slurring of speech, no facial droop  The parents are in the room and state that they did not notice slurred speech or any facial drooping  She did not have any symptoms of the all other extremities  Denies any headache, no visual changes, hearing changes  No recent illness, no chest pain shortness of breath, no abdominal pain, no nausea vomiting  Patient says that in the past, she had similar symptoms with her Raynaud's or is acting up, especially when it was cold  Patient did have some symptoms in her arm, however never persisted for this long before  She denies smoking, drinking drug use  Family history is unknown because she is adopted    ED Vital Signs:   ED Triage Vitals   Temperature Pulse Respirations Blood Pressure SpO2   12/26/17 1313 12/26/17 1313 12/26/17 1313 12/26/17 1313 12/26/17 1313   98 3 °F (36 8 °C) 77 15 166/87 98 %      Temp Source Heart Rate Source Patient Position - Orthostatic VS BP Location FiO2 (%)   12/26/17 1313 12/26/17 1547 12/26/17 1547 12/26/17 1547 --   Temporal Monitor Lying Right arm       Pain Score       12/26/17 1313       No Pain        Wt Readings from Last 1 Encounters:   12/26/17 64 6 kg (142 lb 6 7 oz)       Abnormal Labs/Diagnostic Test Results: CBC and BMP  wnl's  CT Head: Normal head CT except for chronic right maxillary mucosal disease   No acute findings  EKG: Normal sinus rhythm  Normal ECG    ED Treatment:   Medication Administration from 12/26/2017 1309 to 12/26/2017 2046       Date/Time Order Dose Route Action Action by Comments     12/26/2017 1701 aspirin chewable tablet 324 mg 324 mg Oral Given Phylliss Goldberg, RN           Past Medical/Surgical History:    Active Ambulatory Problems     Diagnosis Date Noted    No Active Ambulatory Problems     Resolved Ambulatory Problems Diagnosis Date Noted    No Resolved Ambulatory Problems     Past Medical History:   Diagnosis Date    Confusion and disorientation 12/27/2017    Endometriosis     Kidney stone     Learning disabilities     Migraine headache     Raynaud's phenomenon     Seizure ([address] ) 12/27/2017    Syncope 2014       Admitting Diagnosis: Numbness [R20 0]  Paresthesias/numbness [R20 9]  Word finding difficulty [R47 89]    Age/Sex: 47 y o  female    Assessment/Plan:   Assessment:     Confusion and disorientation     Plan:  Confusion and disorientation  · Patient will be admitted and Neurology will be consulted  · Patient was set for discharge with outpatient follow-up however at the time of her discharge instructions, she had hallucinations and continue paresthesias of the left upper extremity with no focal neurologic deficits        Admission Orders:  TELE  NPO  Consult Neuro  OT Eval  CBC,  BMP  SCD's    Scheduled Meds:   aspirin 81 mg Oral Daily   estradiol 1 mg Oral Daily   heparin (porcine) 5,000 Units Subcutaneous Q8H [address]     Continuous Infusions:    PRN Meds:   acetaminophen    12/27: 97 9 - 78 - 18   135/79;  RA 99% sats  A  Thomas Salazar PA-C Physician Assistant Attested Internal Medicine  H&P Date of Service: 12/27/2017  5:05 AM      Attestation signed by Kristal Sage MD at 12/27/2017 9:48 AM   I performed a history and physical of the patient  I spoke with the patient's father and gave update  I reviewed the patient's labs and radiology images  In brief she is a 60-year-old female, with history of migraine headache, Raynaud's syndrome, and learning disability  She reported having left arm "pins and needles" since Friday  This was associated with headache and word-finding difficulty  Last night she had an event, seen by our rapid response team  She was noted to have jerking movements of her arms and legs, not consistent with renal seizure    She was awake alert with both eyes open and both arms diff at her sides      At the time of my examination the patient was awake alert and pleasant  She had no facial droop  She had no word-finding difficulty  She still feels the pins and needles/numbness on her left arm  Strength was symmetric  I did not appreciate myoclonic jerks are tremors       · Left upper arm paresthesia, associated with headache and word-finding difficulty-CT is negative for stroke  Suspect this is due to complicated migraine  Agree with neurology consultation  · Abnormal movements-description of the event last night appeared to be pseudo seizure and not real seizure  · Learning disability-at baseline  · History of Raynaud's syndrome-I did not appreciate cyanosis of both hands  This appears to be stable              ttestation signed by Flip De Jesus MD at 12/27/2017 9:48 AM  Thank you,  [facility] in the [facility] by Nain Johns for 2017  Network Utilization Review Department  Phone: [phone]; Fax [phone]  ATTENTION: The Network Utilization Review Department is now centralized for our 7 Facilities  Make a note that we have a new phone and fax numbers for our Department  Please call with any questions or concerns to [phone] and carefully follow the prompts so that you are directed to the right person  All voicemails are confidential  Fax any determinations, approvals, denials, and requests for initial or continue stay review clinical to [phone]  Due to HIGH CALL volume, it would be easier if you could please send faxed requests to expedite your requests and in part, help us provide discharge notifications faster

## 2017-12-27 NOTE — ASSESSMENT & PLAN NOTE
Pt remained awake and aware of activity in UE b/l, this is likely psychogenic as pt was very anxious  Previous EEG during comparable episode in 2014 negative for epileptiform spikes concerning for likely pseudoseizure  If s/sx occur without anxiety or pain would need  Further workup at that time

## 2017-12-27 NOTE — H&P
History and Physical - [facility] Internal Medicine    Patient Information: Miriam Xiao 47 y o  female MRN: [id number]  Unit/Bed#: E4 -01 Encounter: [id number]  Admitting Physician: Kathrin Nichole PA-C  PCP: Gabino Patrick DO  Date of Admission:  12/27/17      Assessment:    Confusion and disorientation    Plan:    Confusion and disorientation  · Patient will be admitted and Neurology will be consulted  · Patient was set for discharge with outpatient follow-up however at the time of her discharge instructions, she had hallucinations and continue paresthesias of the left upper extremity with no focal neurologic deficits          HPI:   Tamara Salinas is a 47 y o  female who presented to the emergency department complaining of left arm paresthesias and difficulty word finding  Her parents reports that she has a questionable history of Raynaud's phenomenon and migraines  The paresthesias of the left arm of been present for about 4 days  She denied weakness initially  She reports that her fingers felt stiff  The word-finding difficulty occurred on Saturday, about 3 days ago  There is no slurred speech, dysarthria or facial droop  Denies: Chest pain, Shortness of Breath, Nausea, Vomiting, Diarrhea, Dysuria    ROS:  A 12-point review of systems was done  Please see the HPI for the full details  All other systems negative      PMH:  Principal Problem:    Confusion and disorientation  Active Problems:    Seizure [facility])      Past Medical History:   Diagnosis Date    Confusion and disorientation 12/27/2017    Kidney stone     No known health problems     Raynaud's phenomenon     Seizure [address] [address] ) 12/27/2017     Past Surgical History:   Procedure Laterality Date    HYSTERECTOMY       Social History     Social History    Marital status: Single     Spouse name: N/A    Number of children: N/A    Years of education: N/A     Social History Main Topics    Smoking status: Never Smoker    Smokeless tobacco: Never Used    Alcohol use No    Drug use: No    Sexual activity: Not Asked     Other Topics Concern    None     Social History Narrative    None     History reviewed  No pertinent family history  MED/ALLERGIES:  No current facility-administered medications for this encounter  Allergies   Allergen Reactions    Bee Venom     Morphine     Penicillins        OBJECTIVE:    Current Vitals:   Blood Pressure: 138/71 (12/27/17 0200)  Pulse: 66 (12/27/17 0200)  Temperature: (!) 97 2 °F (36 2 °C) (12/27/17 0200)  Temp Source: Temporal (12/27/17 0200)  Respirations: 16 (12/27/17 0200)  Height: 5' (152 4 cm) (12/26/17 2100)  Weight - Scale: 64 6 kg (142 lb 6 7 oz) (12/26/17 2100)  SpO2: 98 % (12/27/17 0200)      Intake/Output Summary (Last 24 hours) at 12/27/17 0505  Last data filed at 12/27/17 0144   Gross per 24 hour   Intake                0 ml   Output              400 ml   Net             -400 ml       Invasive Devices     Peripheral Intravenous Line            Peripheral IV 12/26/17 Left Arm 1 day                  Physical Exam   Constitutional: She appears well-developed and well-nourished  HENT:   Head: Normocephalic and atraumatic  Right Ear: External ear normal    Left Ear: External ear normal    Eyes: EOM are normal  Pupils are equal, round, and reactive to light  Right eye exhibits no discharge  Left eye exhibits no discharge  No scleral icterus  Cardiovascular: Normal rate, regular rhythm and normal heart sounds  Pulmonary/Chest: Effort normal and breath sounds normal  No respiratory distress  She has no wheezes  Abdominal: Soft  She exhibits no distension  There is no tenderness  Musculoskeletal:   Left upper extremity:  Patient resistant to passive shoulder flexion limits only 70°; patient resistant to passive elbow flexion and limits to about 30°   strength on the left is absent compared to 03/05 on the right      Right upper extremity shoulder and triceps rigidity is similar  Bilateral lower extremity strength and deep tendon reflexes are normal   Neurological: She is alert  No cranial nerve deficit  Skin: Skin is warm and dry  No erythema  Lab Results:   Results from last 7 days  Lab Units 12/26/17  1609   WBC Thousand/uL 5 40   HEMOGLOBIN g/dL 13 3   HEMATOCRIT % 39 3   PLATELETS Thousands/uL 198      Results from last 7 days  Lab Units 12/26/17  1609   SODIUM mmol/L 140   POTASSIUM mmol/L 4 0   CHLORIDE mmol/L 104   CO2 mmol/L 28   BUN mg/dL 9   CREATININE mg/dL 0 81   CALCIUM mg/dL 9 2   GLUCOSE RANDOM mg/dL 99     No results found for: CKTOTAL, CKMBINDEX, TROPONINI      Imaging:  CT BRAIN - WITHOUT CONTRAST     INDICATION:  Left upper extremity paresthesias and word finding difficulty      COMPARISON:  None      TECHNIQUE:  CT examination of the brain was performed  In addition to axial images, coronal reformatted images were created and submitted for interpretation        Radiation dose length product (DLP) for this visit:  1048 mGy-cm   This examination, like all CT scans performed in the [facility], was performed utilizing techniques to minimize radiation dose exposure, including the use of iterative   reconstruction and automated exposure control        IMAGE QUALITY:  Diagnostic      FINDINGS:     PARENCHYMA:  No intracranial mass, mass effect or midline shift  No CT signs of acute infarction  There is no parenchymal hemorrhage           VENTRICLES AND EXTRA-AXIAL SPACES:  Normal for patient's age      VISUALIZED ORBITS AND PARANASAL SINUSES:  21 mm mucus retention cyst in the right maxillary sinus  No acute air-fluid levels in the paranasal sinuses  No mastoid effusions  Orbits are normal      CALVARIUM AND EXTRACRANIAL SOFT TISSUES:   Normal      IMPRESSION:     Normal head CT except for chronic right maxillary mucosal disease  No acute findings        VTE Prophylaxis: Heparin    Code Status: FULL    Counseling / Coordination of Care: Total floor / unit time spent today 20 minutes  Anticipated Length of Stay will be: LESS THAN 2 (TWO) Midnights     Mirna Gruber PA-C    This note has been constructed using a voice recognition system

## 2017-12-27 NOTE — PROGRESS NOTES
Upon entering the room for pts 2300 neuro check, pt was found experiencing seizure-like activity  Pt was turned to the side and Rapid Response was called  RRNP arrived and assessed the pt  Pt experienced 4 total episodes  Episode 1: 12/26, 2251, lasted 1 minute  VS stable and charted  Episode 2: 12/26, 2302, lasted 30 seconds  VS stable and charted  Episode 3: 12/26, 2304, lasted 15 seconds  VS stable and charted  Episode 4: 12/26, 2316, lasted 15 seconds   VS stable and charted   -Will CTM for further episodes

## 2017-12-27 NOTE — ASSESSMENT & PLAN NOTE
Appreciate neuro recommendations  Will d/c on magnesium oxide 400mg daily for prophylaxis given infrequency of headaches no other pharmacotherapy indicated at this time

## 2017-12-27 NOTE — PLAN OF CARE
DISCHARGE PLANNING     Discharge to home or other facility with appropriate resources Progressing        Knowledge Deficit     Patient/family/caregiver demonstrates understanding of disease process, treatment plan, medications, and discharge instructions Progressing        PAIN - ADULT     Verbalizes/displays adequate comfort level or baseline comfort level Progressing        Prexisting or High Potential for Compromised Skin Integrity     Skin integrity is maintained or improved Progressing

## 2018-01-04 ENCOUNTER — ALLSCRIPTS OFFICE VISIT (OUTPATIENT)
Dept: OTHER | Facility: OTHER | Age: 53
End: 2018-01-04

## 2018-01-04 ENCOUNTER — TRANSCRIBE ORDERS (OUTPATIENT)
Dept: ADMINISTRATIVE | Facility: HOSPITAL | Age: 53
End: 2018-01-04

## 2018-01-04 DIAGNOSIS — R20.0 NUMBNESS: Primary | ICD-10-CM

## 2018-01-10 NOTE — RESULT NOTES
Verified Results  (1) BASIC METABOLIC PROFILE [id number] 09:27AM Tameka Sim     Test Name Result Flag Reference   GLUCOSE 90 mg/dL  65-99   Fasting reference interval   UREA NITROGEN (BUN) 17 mg/dL  7-25   CREATININE 0 89 mg/dL  0 50-1 05   For patients >52years of age, the reference limit  for Creatinine is approximately 13% higher for people  identified as -American  eGFR NON-AFR   AMERICAN 75 mL/min/1 73m2  > OR = 60   eGFR AFRICAN AMERICAN 87 mL/min/1 73m2  > OR = 60   BUN/CREATININE RATIO   5-78   NOT APPLICABLE (calc)   SODIUM 140 mmol/L  135-146   POTASSIUM 4 4 mmol/L  3 5-5 3   CHLORIDE 103 mmol/L     CARBON DIOXIDE 30 mmol/L  20-31   CALCIUM 9 4 mg/dL  8 6-10 4     (Q) TSH, 3RD GENERATION W/REFLEX TO FT4 [id number] 09:27AM Tameka Sim   REPORT COMMENT:  FASTING:YES     Test Name Result Flag Reference   TSH W/REFLEX TO FT4 3 25 mIU/L     Reference Range                         > or = 20 Years  0 40-4 50                              Pregnancy Ranges            First trimester    0 26-2 66            Second trimester   0 55-2 73            Third trimester    0 43-2 91

## 2018-01-12 NOTE — PROGRESS NOTES
Assessment   1  Medicare annual wellness visit, subsequent (V70 0) (Z00 00)  2  Flu vaccine need (V04 81) (Z23)    Plan  Colon cancer screening    · 1 - Jalil GUTIERREZ, Beatriz Reis  (General Surgery) Physician Referral  Consult re screening  colonoscopy as now 49 y o  Status: Active  Requested for: [id number]  () Care Summary provided  : Yes   · COLONOSCOPY; Status:Active; Requested FTV:[id number];   Flu vaccine need    · Administered: Fluzone Quadrivalent Intramuscular Suspension  Health Maintenance    · Start: Adacel 5-2-15 5 LF-MCG/0 5 Intramuscular Suspension (Adacel 5-2-15 5  LF-MCG/0 5 Intramuscular Suspension); INJECT 0 5  ML Intramuscular  Lipid screening    · (Q) LIPID PANEL WITH DIRECT LDL; Status:Active; Requested LST:[id number];   Medicare annual wellness visit, subsequent    · *VB - Fall Risk Assessment  (Dx Z13 89 Screen for Neurologic Disorder);  Status:Complete;   Done: [id number] 10:26AM   · *VB - Urinary Incontinence Screen (Dx Z13 89 Screen for UI); Status:Complete;   Done:  88TSP1840 10:26AM  Right nephrolithiasis    · (Q) COMPREHENSIVE METABOLIC PANEL W/O eGFR; Status:Active; Requested  MQV:[id number];     Discussion/Summary    Doing well- continue w exercise bike, healthy diet    She did have emergency room visit back in March with left lower quadrant abdominal pain, had CBC, CMP and urinalysis done then  Was treated for urinary tract infection  Found to have 2 mm right kidney stone  Watch for right flank pain, keep hydrated  Can do fasting BW / Lipid screen  Look into coverage for tetanus shot  Sees Gyn    Recheck here yearly  Impression: Subsequent Annual Wellness Visit  Cardiovascular screening and counseling: due for a lipid panel  Diabetes screening and counseling: screening is current and due for blood glucose  Colorectal cancer screening and counseling: the risks and benefits of screening were discussed and will do screening     Breast cancer screening and counseling: screening is current and sees gyn  Cervical cancer screening and counseling: screening is current and sees gyn  Osteoporosis screening and counseling: screening not indicated  Glaucoma screening and counseling: needs to see Eye Dr  Immunizations: influenza vaccine is due today, influenza vaccination is recommended annually and do if cuts self- can check on coverage  Advance Directive Planning: Full code  Chief Complaint  in for yearly check Has been feeling well -      History of Present Illness  HPI: His last here one year ago, has been feeling well and has no current concerns or complaints  She was seen in March at emergency room with left lower quadrant pain, kidney stone was seen on right, treated for UTI  No further pain1    Welcome to [employer] and Wellness Visits: The patient is being seen for the subsequent annual wellness visit  Medicare Screening and Risk Factors   Hospitalizations: no previous hospitalizations  Medicare Screening Tests Risk Questions   Drug and Alcohol Use: The patient has never smoked cigarettes  The patient reports never drinking alcohol  She has never used illicit drugs  Diet and Physical Activity: Current diet includes well balanced meals, 2-3 servings of fruit per day, 2-3 servings of vegetables per day, 1 cups of coffee per day and 2-3 water  She exercises daily  Exercise: walking 60 minutes per day  Mood Disorder and Cognitive Impairment Screening: She denies feeling down, depressed, or hopeless over the past two weeks  She denies feeling little interest or pleasure in doing things over the past two weeks  Cognitive impairment screening: difficulty learning/retaining new information, difficulty handling complex tasks, denies difficulty with reasoning, denies difficulty with spatial ability and orientation, denies difficulty with language and denies difficulty with behavior     Functional Ability/Level of Safety: Hearing is normal bilaterally and a hearing aid is not used  Activities of daily living details: transportation help needed and meal preparation help needed, but does not need help using the phone, does not need help shopping, does not need help doing housework, does not need help doing laundry, does not need help managing medications and does not need help managing money  Fall risk factors:  no urinary incontinence  Home safety risk factors:  no unfamiliar surroundings, no loose rugs, no poor household lighting, no uneven floors, no household clutter, grab bars in the bathroom and handrails on the stairs  Advance Directives: Advance directives: no living will and no durable power of  for health care directives  Co-Managers and Medical Equipment/Suppliers: See Patient Care Team       1 Amended By: Melisas Raza; Oct 27 2016 12:46 PM EST    Review of Systems    Constitutional: negative  Head and Face: negative  Eyes: negative  ENT: negative  Cardiovascular: negative  Respiratory: negative  Gastrointestinal: negative  Genitourinary: no dysuria, no urinary frequency, no urinary urgency and no suprapubic pain  Musculoskeletal: negative  Integumentary and Breasts: no skin wound, no skin ulcer, no breast pain and no breast lump  Neurological: no headache, no confusion, no dizziness, no fainting, no leg numbness and no leg weakness  Psychiatric: negative  Hematologic and Lymphatic: negative  Active Problems   1  Allergic rhinitis (477 9) (J30 9)  2  Endometriosis (617 9) (N80 9)  3  Learning difficulty (315 9) (F81 9)  4  Medicare annual wellness visit, subsequent (V70 0) (Z00 00)  5  Menopause (627 2)  6  Migraine headache (346 90) (G43 909)  7  Need for prophylactic vaccination and inoculation against influenza (V04 81) (Z23)  8  Raynaud's syndrome without gangrene (443 0) (I73 00)  9   Urinary frequency (788 41) (R35 0)    Past Medical History    · History of Acute cystitis with hematuria (595 0) (N30 01)   · History of fatigue (V13 89) (S11 922)   · History of syncope (V15 89) (T38 645)   · Need for prophylactic vaccination and inoculation against influenza (V04 81) (Z23)   · History of Reactive airway disease (493 90) (J80 913)    The active problems and past medical history were reviewed and updated today  Surgical History    · History of Exploratory Laparoscopy   · History of Total Abdominal Hysterectomy With Removal Of Both Ovaries    The surgical history was reviewed and updated today  Family History  Family History    · Family history of Adopted    The family history was reviewed and updated today  Social History    · Denied: History of Alcohol Use (History)   · Denied: History of Drug Use   · Never A Smoker  The social history was reviewed and updated today  The social history was reviewed and is unchanged  Current Meds  1  Aspirin 81 MG TABS; Take 1 tablet daily; Therapy: (Recorded:20Oct2015) to Recorded  2  BL Vitamin C TABS; Take 1 tablet daily Recorded  3  Calcium 500 MG Oral Tablet Chewable; TAKE 1 TABLET Daily PRN Recorded  4  EQL Evening Primrose Oil CAPS; 1 PO QD Recorded  5  EQL Omega 3 Fish Oil CAPS; TAKE 1 CAPSULE Daily Recorded  6  Flax Seed Oil CAPS; TAKE 1 CAPSULE Daily Recorded  7  Premarin 0 625 MG Oral Tablet; Take 1 tablet daily Recorded    Allergies   1  Penicillins  2  Tetracyclines   3  Bee sting    Immunizations   1 2 3 4    Influenza  01-Oct-2012 10-Oct-2013 16-Oct-2014 20-Oct-2015     Vitals  Signs    Systolic: 165  Diastolic: 70  Heart Rate: 64  Height: 5 ft   Weight: 140 lb 2 08 oz  BMI Calculated: 27 37  BSA Calculated: 1 6    Physical Exam    Constitutional pleasant sitting NAD in w Mom  Head and Face   Head and face: Normal     Palpation of the face and sinuses: No sinus tenderness  Eyes   Conjunctiva and lids: No swelling, erythema or discharge  Pupils and irises: Equal, round, reactive to light      Ears, Nose, Mouth, and Throat   External inspection of ears and nose: Normal     Otoscopic examination: Tympanic membranes translucent with normal light reflex  Canals patent without erythema  Hearing: Normal     Nasal mucosa, septum, and turbinates: Normal without edema or erythema  Lips, teeth, and gums: Normal, good dentition  Oropharynx: Normal with no erythema, edema, exudate or lesions  Neck   Neck: Supple, symmetric, trachea midline, no masses  Thyroid: Normal, no thyromegaly  Pulmonary   Auscultation of lungs: Clear to auscultation  Cardiovascular   Auscultation of heart: Normal rate and rhythm, normal S1 and S2, no murmurs  Carotid pulses: 2+ bilaterally  Examination of extremities for edema and/or varicosities: Normal     Abdomen   Abdomen: Non-tender, no masses  Liver and spleen: No hepatomegaly or splenomegaly  Lymphatic   Palpation of lymph nodes in neck: No lymphadenopathy  Musculoskeletal   Gait and station: Normal   sl red periungual no open wound  Joints, bones, and muscles: Normal     Range of motion: Normal     Stability: Normal     Muscle strength/tone: Normal     Neurologic   Cortical function: Normal mental status  Coordination: Normal finger to nose and heel to shin  Psychiatric   Judgment and insight: Normal     Orientation to person, place, and time: Normal     Recent and remote memory: Intact  Mood and affect: Normal        Results/Data  PHQ-2 Adult Depression Screening 27Oct2016 10:26AM User, Ahs     Test Name Result Flag Reference   PHQ-2 Adult Depression Score 0     Over the last two weeks, how often have you been bothered by any of the following problems?   Little interest or pleasure in doing things: Not at all - 0  Feeling down, depressed, or hopeless: Not at all - 0   PHQ-2 Adult Depression Screening Negative       *VB - Fall Risk Assessment  (Dx Z13 89 Screen for Neurologic Disorder) 27Oct2016 10:26AM Renetta Gonzalez     Test Name Result Flag Reference   Falls Risk      No falls in the past year     *VB - Urinary Incontinence Screen (Dx Z13 89 Screen for UI) 27Oct2016 10:26AM Haider Gelineau     Test Name Result Flag Reference   Urinary Incontinence Assessment [id number]         Signatures   Electronically signed by : Santi Ty DO; Oct 27 2016 12:46PM EST                       (Author)

## 2018-01-12 NOTE — PROGRESS NOTES
Chief Complaint  Pt came in for Urine Dip  Recheck for hematuria  Resulted all clear  Active Problems    1  Acute cystitis with hematuria (595 0) (N30 01)   2  Allergic rhinitis (477 9) (J30 9)   3  Endometriosis (617 9) (N80 9)   4  Influenza vaccination administered during current admission (V04 81) (Z23)   5  Influenza vaccine needed (V04 81) (Z23)   6  Learning difficulty (315 9) (F81 9)   7  Medicare annual wellness visit, subsequent (V70 0) (Z00 00)   8  Menopause (627 2)   9  Migraine headache (346 90) (G43 909)   10  Need for prophylactic vaccination and inoculation against influenza (V04 81) (Z23)   11  Raynaud's syndrome without gangrene (443 0) (I73 00)   12  Urinary frequency (788 41) (R35 0)    Current Meds   1  Aspirin 81 MG Oral Tablet; Take 1 tablet daily; Therapy: (Recorded:20Oct2015) to Recorded   2  BL Vitamin C TABS; Take 1 tablet daily Recorded   3  Calcium 500 MG Oral Tablet Chewable; TAKE 1 TABLET Daily PRN Recorded   4  DrRx Bactrim  MG/160 MG #14; one tablet bid x7 days; Therapy: [id number] to (Evaluate:13Mar2016); Last Rx:06Mar2016 Ordered   5  EQL Evening Primrose Oil CAPS; 1 PO QD Recorded   6  EQL [place] 3 Fish Oil CAPS; TAKE 1 CAPSULE Daily Recorded   7  Flax Seed Oil CAPS; TAKE 1 CAPSULE Daily Recorded   8  Nitrofurantoin Macrocrystal 100 MG Oral Capsule; Take 1 po bid x 7 days; Therapy: [id number] to (Angelina Schmidt)  Requested for: 83NSO0952; Last   Rx:01Mar2016 Ordered   9  Premarin 0 625 MG Oral Tablet; Take 1 tablet daily Recorded    Allergies    1  Penicillins   2  Tetracyclines    3   Bee sting    Results/Data  Urine Dip Non-Automated- POC 34GZL3401 01:35PM Korin Arriaza     Test Name Result Flag Reference   Color Yellow     Clarity Transparent     Leukocytes Neg     Nitrite Neg     Blood Neg     Bilirubin Neg     Urobilinogen 0 2     Protein Neg     Ph 6 0     Specific Gravity 1 025     Ketone Neg     Glucose Neg         Plan  Acute cystitis with hematuria · Urine Dip Non-Automated- POC; Status:Complete - Retrospective By Protocol  Authorization;   Done: 90ZTG2854 01:35PM    Signatures   Electronically signed by : Inga Fall MD; Mar 22 2016  4:18PM EST                       (Author)

## 2018-01-14 VITALS
SYSTOLIC BLOOD PRESSURE: 128 MMHG | BODY MASS INDEX: 28.27 KG/M2 | HEART RATE: 72 BPM | HEIGHT: 60 IN | WEIGHT: 144 LBS | DIASTOLIC BLOOD PRESSURE: 76 MMHG

## 2018-01-17 NOTE — PROGRESS NOTES
Assessment   1  Medicare annual wellness visit, subsequent (V70 0) (Z00 00)  2  Never smoker  3  Flu vaccine need (V04 81) (Z23)    Plan  Endometriosis, Learning difficulty, Migraine headache, Raynaud's syndrome without  gangrene    · (1) TSH WITH FT4 REFLEX; Status:Active; Requested for:31Oct2017;   Flu vaccine need    · Administered: Fluzone Quadrivalent 0 5 ML Intramuscular Suspension Prefilled Syringe  Raynaud's syndrome without gangrene    · (1) BASIC METABOLIC PROFILE; Status:Active; Requested for:31Oct2017;     Discussion/Summary    She is doing well - she will continue w exercise, watch healthy diet - no issues w tiny 2mm right kidney stone  Recheck here yrly Also do fasting BW  Impression: Subsequent Annual Wellness Visit  Cardiovascular screening and counseling: screening is current and 2016 chol 185/67/108  Diabetes screening and counseling: screening is current and due for blood glucose  Colorectal cancer screening and counseling: the risks and benefits of screening were discussed and has info to set up  Breast cancer screening and counseling: screening is current  Cervical cancer screening and counseling: screening is current  Glaucoma screening and counseling: should set up as none for yrs  Immunizations: influenza vaccine is due today, influenza vaccination is recommended annually and do if cuts self- can check on coverage  Advance Directive Planning: Full code  Chief Complaint  she is in for a Physical      History of Present Illness  HPI: in for reg yrly check she feels well She walks for exercise, uses bike, light weights    On Premarin via [employer] to [employer] and Wellness Visits: The patient is being seen for the subsequent annual wellness visit  Medicare Screening and Risk Factors   Hospitalizations: no previous hospitalizations and last ER 3/16 w abd pain none since    Medicare Screening Tests Risk Questions   Abdominal aortic aneurysm risk assessment: none indicated  Osteoporosis risk assessment: none indicated  HIV risk assessment: none indicated  Drug and Alcohol Use: The patient has never smoked cigarettes  The patient reports never drinking alcohol  She has never used illicit drugs  Diet and Physical Activity: Current diet includes well balanced meals, 2-3 servings of fruit per day, 2-3 servings of vegetables per day, 1 cups of coffee per day and 2-3 water  She exercises daily  Exercise: walking, see HPI 60 minutes per day  Mood Disorder and Cognitive Impairment Screening: She denies feeling down, depressed, or hopeless over the past two weeks  She denies feeling little interest or pleasure in doing things over the past two weeks  Cognitive impairment screening: difficulty learning/retaining new information, difficulty handling complex tasks, denies difficulty with reasoning, denies difficulty with spatial ability and orientation, denies difficulty with language and denies difficulty with behavior  Functional Ability/Level of Safety: Hearing is normal bilaterally  She does not use a hearing aid  The patient is currently able to do activities of daily living without limitations  Activities of daily living details: transportation help needed and needs help managing money, but does not need help using the phone, does not need help shopping, no meal preparation help needed, does not need help doing housework, does not need help doing laundry and does not need help managing medications  Fall risk factors: The patient fell times in the past 12 months  0  Home safety risk factors:  no unfamiliar surroundings, no loose rugs, no poor household lighting, no uneven floors, no household clutter, grab bars in the bathroom and handrails on the stairs  Advance Directives: Advance directives: has info at home, but no living will and no durable power of  for health care directives     Co-Managers and Medical Equipment/Suppliers: See Patient Care Team Reviewed Updated [facility]:   Last Medicare Wellness Visit Information was reviewed, patient interviewed, no change since last AWV  Review of Systems    Constitutional: up few lbs, but no fever, no chills and no fatigue  Head and Face: no facial pain and no facial pressure  Eyes: no blurred vision  ENT: no earache and no sore throat  Cardiovascular: negative  Respiratory: no shortness of breath, no wheezing and no cough  Gastrointestinal: no change BM, but no abdominal pain, no nausea, no vomiting, no bright red blood per rectum and no melena  Genitourinary: sees gyn, but no dysuria  Musculoskeletal: no diffuse joint pain and no generalized muscle aches  Integumentary and Breasts: no rashes, no breast pain and no breast lump  Neurological: no headache, no confusion and no dizziness  Psychiatric: no anxiety and no depression  Hematologic and Lymphatic: negative  Active Problems   1  Allergic rhinitis (477 9) (J30 9)  2  Colon cancer screening (V76 51) (Z12 11)  3  Endometriosis (617 9) (N80 9)  4  Flu vaccine need (V04 81) (Z23)  5  Learning difficulty (315 9) (F81 9)  6  Medicare annual wellness visit, subsequent (V70 0) (Z00 00)  7  Menopause (627 2)  8  Migraine headache (346 90) (G43 909)  9  Raynaud's syndrome without gangrene (443 0) (I73 00)  10  Right nephrolithiasis (592 0) (N20 0)    Past Medical History    · History of Acute cystitis with hematuria (595 0) (N30 01)   · History of fatigue (V13 89) (N71 349)   · History of influenza vaccination (V49 89) (Z92 29)   · History of syncope (V15 89) (C41 477)   · History of Reactive airway disease (493 90) (J45 909)    The active problems and past medical history were reviewed and updated today  Surgical History    · History of Exploratory Laparoscopy   · History of Total Abdominal Hysterectomy With Removal Of Both Ovaries    The surgical history was reviewed and updated today         Family History  Family History    · Family history of Adopted    The family history was reviewed and updated today  Social History    · Denied: History of Alcohol Use (History)   · Denied: History of Drug Use   · Never smoker  The social history was reviewed and updated today  Current Meds  1  Aspirin 81 MG TABS; Take 1 tablet daily; Therapy: (Recorded:20Oct2015) to Recorded  2  BL Vitamin C TABS; Take 1 tablet daily Recorded  3  Calcium 500 MG Oral Tablet Chewable; TAKE 1 TABLET Daily PRN Recorded  4  Cranberry CAPS; take 1 capsule daily; Therapy: (Recorded:31Oct2017) to Recorded  5  EQL Evening Primrose Oil CAPS; 1 PO QD Recorded  6  EQL [place] 3 Fish Oil CAPS; TAKE 1 CAPSULE Daily Recorded  7  Flax Seed Oil CAPS; TAKE 1 CAPSULE Daily Recorded  8  Premarin 0 625 MG Oral Tablet; Take 1 tablet daily Recorded    The medication list was reviewed and updated today  Allergies   1  Penicillins  2  Tetracyclines   3  Bee sting    Immunizations   ** Printed in Appendix #1 below  Vitals  Signs    Heart Rate: 72  Systolic: 990  Diastolic: 76  Height: 5 ft   Weight: 144 lb   BMI Calculated: 28 12  BSA Calculated: 1 62    Physical Exam    Constitutional pleasant sitting NAD in w Mom  Head and Face   Head and face: Normal     Palpation of the face and sinuses: No sinus tenderness  Eyes   Conjunctiva and lids: No swelling, erythema or discharge  Pupils and irises: Equal, round, reactive to light  Ears, Nose, Mouth, and Throat   External inspection of ears and nose: Normal     Otoscopic examination: Tympanic membranes translucent with normal light reflex  Canals patent without erythema  Hearing: Normal     Nasal mucosa, septum, and turbinates: Normal without edema or erythema  Lips, teeth, and gums: Normal, good dentition  Oropharynx: Normal with no erythema, edema, exudate or lesions  Neck   Neck: Supple, symmetric, trachea midline, no masses  Thyroid: Normal, no thyromegaly      Pulmonary   Auscultation of lungs: Clear to auscultation  Cardiovascular   Auscultation of heart: Normal rate and rhythm, normal S1 and S2, no murmurs  Carotid pulses: 2+ bilaterally  Examination of extremities for edema and/or varicosities: Normal     Abdomen   Abdomen: Non-tender, no masses  Liver and spleen: No hepatomegaly or splenomegaly  Lymphatic   Palpation of lymph nodes in neck: No lymphadenopathy  Musculoskeletal   Gait and station: Normal   sl red periungual no open wound  Joints, bones, and muscles: Normal     Range of motion: Normal     Stability: Normal     Muscle strength/tone: Normal     Neurologic   Cortical function: Normal mental status  Coordination: Normal finger to nose and heel to shin  Psychiatric   Judgment and insight: Normal     Orientation to person, place, and time: Normal     Recent and remote memory: Intact      Mood and affect: Normal        Signatures   Electronically signed by : Ernestine Camacho DO; Oct 31 2017  2:18PM EST                       (Author)    Appendix #1     Patient: Mele Ackerman ; : 1965; MRN: [id number]      1 2 3 4 5    Influenza  01-Oct-2012 10-Oct-2013 16-Oct-2014 20-Oct-2015 27-Oct-2016

## 2018-01-18 NOTE — RESULT NOTES
Verified Results  (Q) COMPREHENSIVE METABOLIC PANEL W/O eGFR [id number] 09:23AM Dea Beltre   REPORT COMMENT:  FASTING:YES     Test Name Result Flag Reference   GLUCOSE 85 mg/dL  65-99   Fasting reference interval   UREA NITROGEN (BUN) 12 mg/dL  7-25   CREATININE 0 77 mg/dL  0 50-1 05   For patients >52years of age, the reference limit  for Creatinine is approximately 13% higher for people  identified as -American  BUN/CREATININE RATIO   0-06   NOT APPLICABLE (calc)   SODIUM 139 mmol/L  135-146   POTASSIUM 4 2 mmol/L  3 5-5 3   CHLORIDE 104 mmol/L     CARBON DIOXIDE 29 mmol/L  20-31   CALCIUM 9 5 mg/dL  8 6-10 4   PROTEIN, TOTAL 7 4 g/dL  6 1-8 1   ALBUMIN 4 4 g/dL  3 6-5 1   GLOBULIN 3 0 g/dL (calc)  1 9-3 7   ALBUMIN/GLOBULIN RATIO 1 5 (calc)  1 0-2 5   BILIRUBIN, TOTAL 0 5 mg/dL  0 2-1 2   ALKALINE PHOSPHATASE 61 U/L     AST 20 U/L  10-35   ALT 22 U/L  6-29     (Q) LIPID PANEL WITH DIRECT LDL [id number] 09:23AM Dea Beltre     Test Name Result Flag Reference   CHOLESTEROL, TOTAL 185 mg/dL  125-200   HDL CHOLESTEROL 67 mg/dL  > OR = 46   TRIGLICERIDES 99 mg/dL  <934   DIRECT  mg/dL  <130   Desirable range <100 mg/dL for patients with CHD or  diabetes and <70 mg/dL for diabetic patients with  known heart disease  CHOL/HDLC RATIO 2 8 (calc)  < OR = 5 0   NON HDL CHOLESTEROL 118 mg/dL (calc)     Target for non-HDL cholesterol is 30 mg/dL higher than   LDL cholesterol target

## 2018-01-23 VITALS
SYSTOLIC BLOOD PRESSURE: 120 MMHG | DIASTOLIC BLOOD PRESSURE: 74 MMHG | HEART RATE: 76 BPM | WEIGHT: 142.5 LBS | HEIGHT: 60 IN | BODY MASS INDEX: 27.98 KG/M2

## 2018-01-23 NOTE — MISCELLANEOUS
Assessment   1  Left upper extremity numbness (782 0) (R20 0)  2  Pseudoseizures (780 39) (F44 5)  3  Word finding difficulty (V40 1) (R47 89)  4  Raynaud's syndrome (443 0) (I73 00)  5  Migraine headache (346 90) (G43 909)    Discussion/Summary  Discussion Summary:   We reviewed hospital stay w 'seizure-like activity' seen in ER -- episode appears to be 'pseudoseizure' as per final reports - as saw Neurology/ EEG ok 2014/ CT head ok - hold other testing/ observe  BW/ EKG ok  Re left arm paresthesias which worsened/ long standing hand numbness- do EMG LUE - use cock-up splint at night  Hx Raynauds - use Aspirin as is  Stay on Mg Ox daily, started at hospital - watch migraines- if worsen- consider other med  Other treatment/ plan as in 10/31/17 visit  Chief Complaint  Chief Complaint Free Text Note Form: She is for a hospital f/up on left arm pain   she is still having for numbness and feeling "pins and needle"        History of Present Illness  TCM Communication [facility]: The patient is being contacted for follow-up after hospitalization  Hospital records were not available  She was hospitalized at [facility]  The date of admission: 12/26/2017, date of discharge: 12/27/2017  Diagnosis: L ARM PAIN  She was discharged to home  Medications were not reviewed today  She scheduled a follow up appointment  The patient is currently asymptomatic  Counseling was provided to the patient and patient's family  MOTHER     Communication performed and completed by VIKI GALLARDO [facility] RN   HPI: is in after hospital stay [facility] - had called us 12/26 w left arm numbness- referred to ER --- around Xmas had some increased numbness/ pins and needles left arm w/out CP or SOB - long standing L >> R hand, this was different - no neck pain - seen at ER - while at ER had flailing episode ( mom relates gets overwhelmed at ER visits and has had' pseudoseizures' there before) Had issues w word-finding - Was kept inpt 12/26-27, saw Neurology and not felt to be true Seizure- prev EEG neg 2014  Ct scan inpt= ok other than right max cyst  BW ok  Now at baseline- only issue = left arm/ hand      Review of Systems  Complete-Female:   Constitutional: as noted in HPI, no fever, no recent weight gain, no chills and no recent weight loss  Eyes: no eyesight problems  ENT: no sore throat  Cardiovascular: the heart rate was not slow, no chest pain, the heart rate was not fast, no palpitations and no lower extremity edema  Respiratory: no shortness of breath, no cough and no wheezing  Gastrointestinal: No change in bowel, but no abdominal pain, no nausea, no vomiting and no blood in stools  Genitourinary: no dysuria  Musculoskeletal: No increased joint pain  Integumentary: no rashes and no skin wound  Neurological: as noted in HPI and no headache  Psychiatric: as noted in HPI  Hematologic/Lymphatic: No complaints of swollen glands, no swollen glands in the neck, does not bleed easily, does not bruise easily  Active Problems   1  Allergic rhinitis (477 9) (J30 9)  2  Colon cancer screening (V76 51) (Z12 11)  3  Endometriosis (617 9) (N80 9)  4  Learning difficulty (315 9) (F81 9)  5  Menopause (627 2)  6  Raynaud's syndrome without gangrene (443 0) (I73 00)  7  Right nephrolithiasis (592 0) (N20 0)    Past Medical History   1  History of Acute cystitis with hematuria (595 0) (N30 01)  2  Flu vaccine need (V04 81) (Z23)  3  History of fatigue (V13 89) (Z87 898)  4  History of influenza vaccination (V49 89) (Z92 29)  5  History of syncope (V15 89) (Z87 898)  6  History of Reactive airway disease (493 90) (J58 904)    Surgical History   1  History of Exploratory Laparoscopy  2  History of Total Abdominal Hysterectomy With Removal Of Both Ovaries  Surgical History Reviewed: The surgical history was reviewed and updated today  Family History  Family History   1   Family history of Adopted    Social History    · Denied: History of Alcohol Use (History)   · Denied: History of Drug Use   · Never smoker  Social History Reviewed: The social history was reviewed and updated today  Current Meds  1  Aspirin 81 MG TABS; Take 1 tablet daily; Therapy: (Recorded:20Oct2015) to Recorded  2  BL Vitamin C TABS; Take 1 tablet daily Recorded  3  Calcium 500 MG Oral Tablet Chewable; TAKE 1 TABLET Daily PRN Recorded  4  Cranberry CAPS; take 1 capsule daily; Therapy: (Recorded:31Oct2017) to Recorded  5  EQL Evening Primrose Oil CAPS; 1 PO QD Recorded  6  EQL [place] 3 Fish Oil CAPS; TAKE 1 CAPSULE Daily Recorded  7  Flax Seed Oil CAPS; TAKE 1 CAPSULE Daily Recorded  8  Premarin 0 625 MG Oral Tablet; Take 1 tablet daily Recorded  Medication List Reviewed: The medication list was reviewed and updated today  Allergies   1  Penicillins  2  Tetracyclines   3  Bee sting    Vitals  Signs   Recorded: 35NTF1616 10:44AM   Heart Rate: 76  Systolic: 723  Diastolic: 74  Height: 5 ft   Weight: 142 lb 8 oz  BMI Calculated: 27 83  BSA Calculated: 1 62    Physical Exam    Constitutional   General appearance: No acute distress, well appearing and well nourished  Pleasant female seated no acute distress accompanied by her mother as usual    Eyes   Conjunctiva and lids: No swelling, erythema or discharge  Pupils and irises: Equal, round and reactive to light  No nystagmus, exotropia OS  Ears, Nose, Mouth, and Throat   Otoscopic examination: Tympanic membranes translucent with normal light reflex  Canals patent without erythema  Oropharynx: Normal with no erythema, edema, exudate or lesions  Pulmonary   Auscultation of lungs: Clear to auscultation  Cardiovascular   Auscultation of heart: Normal rate and rhythm, normal S1 and S2, without murmurs  Examination of extremities for edema and/or varicosities: Normal     Abdomen   Abdomen: Non-tender, no masses  Lymphatic   Palpation of lymph nodes in neck: No lymphadenopathy      Musculoskeletal   Gait and station: Normal     Neurologic   Cranial nerves: Cranial nerves 2-12 intact  mild generalized decreased sensation to touch forearm/ hand left good full ROM extremity, CSpine nontender, no compressive sx - neg tinel/ phalen sl redness fingers no open wound     Psychiatric   Orientation to person, place, and time: Normal     Mood and affect: Normal          Signatures   Electronically signed by : Mike Eisenberg, ; Dec 28 2017  5:32PM EST                       (Author)    Electronically signed by : Nino Raines DO; Jan 4 2018 12:35PM EST                       (Author)    Electronically signed by : Nino Raines DO; Jan 4 2018  2:02PM EST                       (Author)

## 2018-02-05 ENCOUNTER — HOSPITAL ENCOUNTER (OUTPATIENT)
Dept: NEUROLOGY | Facility: CLINIC | Age: 53
Discharge: HOME | End: 2018-02-05
Payer: COMMERCIAL

## 2018-02-05 DIAGNOSIS — R20.0 LUE NUMBNESS: ICD-10-CM

## 2018-02-05 DIAGNOSIS — R20.0 NUMBNESS: ICD-10-CM

## 2018-02-05 PROCEDURE — 95910 NRV CNDJ TEST 7-8 STUDIES: CPT | Performed by: PSYCHIATRY & NEUROLOGY

## 2018-02-05 PROCEDURE — 95886 MUSC TEST DONE W/N TEST COMP: CPT | Performed by: PSYCHIATRY & NEUROLOGY

## 2018-10-26 ENCOUNTER — TRANSCRIBE ORDERS (OUTPATIENT)
Dept: ADMINISTRATIVE | Facility: HOSPITAL | Age: 53
End: 2018-10-26

## 2018-10-26 DIAGNOSIS — Z12.39 SCREENING BREAST EXAMINATION: Primary | ICD-10-CM

## 2018-12-03 ENCOUNTER — OFFICE VISIT (OUTPATIENT)
Dept: FAMILY MEDICINE CLINIC | Facility: CLINIC | Age: 53
End: 2018-12-03
Payer: COMMERCIAL

## 2018-12-03 VITALS
HEART RATE: 70 BPM | WEIGHT: 142.6 LBS | BODY MASS INDEX: 28 KG/M2 | DIASTOLIC BLOOD PRESSURE: 70 MMHG | OXYGEN SATURATION: 98 % | SYSTOLIC BLOOD PRESSURE: 118 MMHG | HEIGHT: 60 IN

## 2018-12-03 DIAGNOSIS — Z12.11 COLON CANCER SCREENING: ICD-10-CM

## 2018-12-03 DIAGNOSIS — Z00.00 MEDICARE ANNUAL WELLNESS VISIT, SUBSEQUENT: Primary | ICD-10-CM

## 2018-12-03 DIAGNOSIS — Z23 NEED FOR INFLUENZA VACCINATION: ICD-10-CM

## 2018-12-03 PROBLEM — F44.5 PSEUDOSEIZURES: Status: RESOLVED | Noted: 2018-01-04 | Resolved: 2018-12-03

## 2018-12-03 PROBLEM — F44.5 PSEUDOSEIZURES: Status: ACTIVE | Noted: 2018-01-04

## 2018-12-03 PROBLEM — J34.1 MAXILLARY SINUS CYST: Status: ACTIVE | Noted: 2018-01-04

## 2018-12-03 PROBLEM — R56.9 PSEUDOSEIZURES (HCC): Status: ACTIVE | Noted: 2018-01-04

## 2018-12-03 PROBLEM — R56.9 PSEUDOSEIZURES (HCC): Status: RESOLVED | Noted: 2018-01-04 | Resolved: 2018-12-03

## 2018-12-03 PROBLEM — R51.9 HEADACHE: Status: RESOLVED | Noted: 2017-12-27 | Resolved: 2018-12-03

## 2018-12-03 PROBLEM — R41.0 CONFUSION AND DISORIENTATION: Status: RESOLVED | Noted: 2017-12-27 | Resolved: 2018-12-03

## 2018-12-03 PROCEDURE — G0439 PPPS, SUBSEQ VISIT: HCPCS | Performed by: FAMILY MEDICINE

## 2018-12-03 PROCEDURE — 90471 IMMUNIZATION ADMIN: CPT

## 2018-12-03 PROCEDURE — 90682 RIV4 VACC RECOMBINANT DNA IM: CPT

## 2018-12-03 PROCEDURE — 3008F BODY MASS INDEX DOCD: CPT | Performed by: FAMILY MEDICINE

## 2018-12-03 RX ORDER — PERPHENAZINE/AMITRIPTYLINE HCL 2 MG-25 MG
1 TABLET ORAL DAILY
COMMUNITY

## 2018-12-03 RX ORDER — CALCIUM CARBONATE 500(1250)
1 TABLET ORAL DAILY
COMMUNITY

## 2018-12-03 NOTE — PROGRESS NOTES
Assessment and Plan:    Problem List Items Addressed This Visit     None      Visit Diagnoses     Medicare annual wellness visit, subsequent    -  Primary    Need for influenza vaccination        Relevant Orders    PREFERRED: influenza vaccine, 1458-1656, quadrivalent, recombinant, PF, 0 5 mL, for patients 18 yr+ (FLUBLOK) (Completed)    Colon cancer screening        Relevant Orders    Ambulatory referral to General Surgery        Health Maintenance Due   Topic Date Due    CRC Screening: Colonoscopy  1965    DTaP,Tdap,and Td Vaccines (1 - Tdap) 05/04/1987    MAMMOGRAM  10/23/2018     See other note today regarding provider information    HPI:  Anne Ferrara is a 47 y o  female here for her Subsequent Wellness Visit  Patient Active Problem List   Diagnosis    Seizure-like activity ([address] )    LUE numbness    Right nephrolithiasis    Raynaud's phenomenon    Migraine headache    Maxillary sinus cyst    Learning difficulty    Endometriosis    Allergic rhinitis     Past Medical History:   Diagnosis Date    Acute cystitis with hematuria     Last assessed: 3/1/16    Carpal tunnel syndrome of left wrist 12/2017    Confusion and disorientation 12/27/2017    Endometriosis     Kidney stone     Learning disabilities     Migraine headache     Raynaud's phenomenon     Reactive airway disease     Syncope 2014    Last assessed: 10/10/13     Past Surgical History:   Procedure Laterality Date    EXPLORATORY LAPAROTOMY      HYSTERECTOMY  08/2014    Total Abdominal     Family History   Problem Relation Age of Onset    Adopted: Yes     History   Smoking Status    Never Smoker   Smokeless Tobacco    Never Used     History   Alcohol Use No      History   Drug Use No       Current Outpatient Prescriptions   Medication Sig Dispense Refill    aspirin 81 MG tablet Take 81 mg by mouth daily        Calcium 500 MG tablet Take 1 tablet by mouth daily        estradiol (ESTRACE) 1 mg tablet Take 1 mg by mouth daily      Flaxseed, Linseed, (FLAX SEED OIL) 1300 MG CAPS Take 1 capsule by mouth daily      magnesium oxide (MAG-OX) 400 mg Take 1 tablet by mouth daily (Patient taking differently: Take 400 mg by mouth 3 (three) times a week  ) 30 tablet 0     No current facility-administered medications for this visit  Allergies   Allergen Reactions    Bee Venom     Morphine     Penicillins     Sulfa Antibiotics GI Intolerance     Immunization History   Administered Date(s) Administered    Influenza Quadrivalent Preservative Free 3 years and older IM 10/16/2014, 10/31/2017    Influenza Quadrivalent, 6-35 Months IM 10/20/2015, 10/27/2016    Influenza TIV (IM) 10/01/2012, 10/10/2013    Influenza, recombinant, quadrivalent,injectable, preservative free 12/03/2018       Patient Care Team:  Misty Kanner, DO as PCP - General (Family Medicine)    Medicare Screening Tests and Risk Assessments:  Anika Abraham is here for her Subsequent Wellness visit  Health Risk Assessment:  Patient rates overall health as excellent  Patient feels that their physical health rating is Much better  Eyesight was rated as Same  Hearing was rated as Same  Patient feels that their emotional and mental health rating is Much better  Pain experienced by patient in the last 7 days has been Some  Patient's pain rating has been 2/10  Emotional/Mental Health:  Patient has been feeling nervous/anxious  PHQ-9 Depression Screening:    Frequency of the following problems over the past two weeks:      1  Little interest or pleasure in doing things: 0 - not at all      2  Feeling down, depressed, or hopeless: 0 - not at all  PHQ-2 Score: 0          Broken Bones/Falls: Fall Risk Assessment:    In the past year, patient has experienced: No history of falling in past year          Bladder/Bowel:  Patient has not leaked urine accidently in the last six months  Patient reports no loss of bowel control      Immunizations:  Patient has had a flu vaccination within the last year  Home Safety:  Patient does not have trouble with stairs inside or outside of their home  Patient currently reports that there are no safety hazards present in home, working smoke alarms, no working carbon monoxide detectors  Preventative Screenings:   Breast cancer screening performed,     Nutrition:  Current diet: Regular with servings of the following:    Medications:  Patient is currently taking over-the-counter supplements  Patient is able to manage medications  Lifestyle Choices:  Patient reports no tobacco use  Patient has not smoked or used tobacco in the past   Patient reports no alcohol use  Patient does not drive a vehicle  Patient wears seat belt  Current level of exercise of physical activity described by patient as: walking   Activities of Daily Living:  Can get out of bed by his or her self, able to dress self, able to make own meals, able to do own shopping, able to bathe self, can do own laundry/housekeeping, can manage own money, pay bills and track expenses    Previous Hospitalizations:  No hospitalization or ED visit in past 12 months        Advanced Directives:  Patient has not completed advanced directive

## 2018-12-03 NOTE — PATIENT INSTRUCTIONS
She is in today for a Subsequent Medicare evaluation  She is medically stable/ doing well  History Raynaud's, continue with aspirin 81 mg daily  Immunization History   Administered Date(s) Administered    Influenza Quadrivalent Preservative Free 3 years and older IM 10/16/2014, 10/31/2017    Influenza Quadrivalent, 6-35 Months IM 10/20/2015, 10/27/2016    Influenza TIV (IM) 10/01/2012, 10/10/2013     She does do yearly Flu shot  ( today)  Tdap/tetanus shot will be done at a future date  (done every 10 yrs for superficial cuts, every 5 yrs for deep wounds)      Was never a smoker     Regarding Colon Cancer screening,  Will set up Colonoscopy screening ( has never done )    She does see her Gynecologist routinely  up-to-date  Mammogram screening was discussed, is  up-to-date,  ordered      We discussed end of life planning, she does not have a  "LIVING WILL"    Full code status  Glaucoma screening is   overdue    We did review previous blood work, 1 year ago CBC, BMP, TSH were fine  ( consider redo CMP/ UA 1 year )  She is up to date with Lipid screening  2016 cholesterol 185, HDL 67,   She is up to date with Diabetes screening  Discussed importance of routine exercise ( walk/ exercise bike/ light weights ) healthy diet  We will see her back in 12 months, sooner as needed      She does have benign skin tag right thoracic under bra line, occasionally irritated, just observe for now, consider shave excision if bothersome

## 2018-12-03 NOTE — PROGRESS NOTES
Romain MULLIGAN  [address] [facility]  [facility] [address]  [address]   [address] [place] [address], [facility]     MEDICARE SUBSEQUENT VISIT NOTE ( part 1 )      NAME: Carlene Nissen  AGE: 47 y o  SEX: female  : 1965   MRN: [id number]    DATE: 12/3/2018  TIME: 2:49 PM    Assessment and Plan     Problem List Items Addressed This Visit     None      Visit Diagnoses     Medicare annual wellness visit, subsequent    -  Primary    Need for influenza vaccination        Relevant Orders    PREFERRED: influenza vaccine, [id number], quadrivalent, recombinant, PF, 0 5 mL, for patients 18 yr+ (FLUBLOK) (Completed)    Colon cancer screening        Relevant Orders    Ambulatory referral to General Surgery          Medicare Wellness Counseling/ Discussion    Patient Instructions     She is in today for a Subsequent Medicare evaluation  She is medically stable/ doing well  History Raynaud's, continue with aspirin 81 mg daily  Immunization History   Administered Date(s) Administered    Influenza Quadrivalent Preservative Free 3 years and older IM 10/16/2014, 10/31/2017    Influenza Quadrivalent, 6-35 Months IM 10/20/2015, 10/27/2016    Influenza TIV (IM) 10/01/2012, 10/10/2013     She does do yearly Flu shot  ( today)  Tdap/tetanus shot will be done at a future date  (done every 10 yrs for superficial cuts, every 5 yrs for deep wounds)      Was never a smoker     Regarding Colon Cancer screening,  Will set up Colonoscopy screening ( has never done )    She does see her Gynecologist routinely  up-to-date  Mammogram screening was discussed, is  up-to-date,  ordered      We discussed end of life planning, she does not have a  "LIVING WILL"    Full code status  Glaucoma screening is   overdue    We did review previous blood work, 1 year ago CBC, BMP, TSH were fine  ( consider redo CMP/ UA 1 year )  She is up to date with Lipid screening  2016 cholesterol 185, HDL 67,   She is up to date with Diabetes screening  Discussed importance of routine exercise ( walk/ exercise bike/ light weights ) healthy diet  We will see her back in 12 months, sooner as needed  She does have benign skin tag right thoracic under bra line, occasionally irritated, just observe for now, consider shave excision if bothersome                    Chief Complaint     Chief Complaint   Patient presents with   [facility]     Subsequent       History of Present Illness     Portillo Sahu is a 47y o -year-old female who is in today for a yearly evaluation, she is doing well, she tries to watch healthy diet, tries to walk, cycle for exercise  Left upper extremity numbness for which she was seen back in January has been much improved  No further seizure-like activity  No increased headaches  Review of Systems     Review of Systems   Constitutional: Negative for activity change, appetite change, fatigue, fever and unexpected weight change  HENT: Negative for congestion, sore throat and trouble swallowing  Eyes: Negative for visual disturbance  Respiratory: Negative for cough, chest tightness and shortness of breath  Cardiovascular: Negative for chest pain, palpitations and leg swelling  Gastrointestinal: Negative for abdominal pain, blood in stool, constipation, nausea and vomiting  No acid reflux     No change in bowel   Genitourinary: Negative for dysuria, hematuria and vaginal bleeding  She does see gyn  Is on estradiol 1 mg daily   Musculoskeletal: Negative for arthralgias, back pain, gait problem and joint swelling  Skin:        History Raynauds, on aspirin without issue, no wounds or increased redness   Neurological: Negative for dizziness, seizures, syncope, light-headedness and headaches  Hematological: Does not bruise/bleed easily     Psychiatric/Behavioral: Positive for sleep disturbance (She feels she is needing less sleep but notes no increased tiredness)  Negative for agitation and behavioral problems  Active Problem List     Patient Active Problem List   Diagnosis    Seizure-like activity ([address] )    LUE numbness    Right nephrolithiasis    Raynaud's phenomenon    Migraine headache    Maxillary sinus cyst    Learning difficulty    Endometriosis    Allergic rhinitis       Past Medical History:  Past Medical History:   Diagnosis Date    Acute cystitis with hematuria     Last assessed: 3/1/16    Carpal tunnel syndrome of left wrist 12/2017    Confusion and disorientation 12/27/2017    Endometriosis     Kidney stone     Learning disabilities     Migraine headache     Raynaud's phenomenon     Reactive airway disease     Syncope 2014    Last assessed: 10/10/13       Past Surgical History:  Past Surgical History:   Procedure Laterality Date    EXPLORATORY LAPAROTOMY      HYSTERECTOMY  08/2014    Total Abdominal       Family History:  Family History   Problem Relation Age of Onset    Adopted: Yes       Social History:  Social History     Social History    Marital status: Single     Spouse name: N/A    Number of children: N/A    Years of education: N/A     Occupational History    Not on file  Social History Main Topics    Smoking status: Never Smoker    Smokeless tobacco: Never Used    Alcohol use No    Drug use: No    Sexual activity: Not on file     Other Topics Concern    Not on file     Social History Narrative    No narrative on file       Objective     Vitals:    12/03/18 1257   BP: 118/70   BP Location: Left arm   Patient Position: Sitting   Cuff Size: Large   Pulse: 70   SpO2: 98%   Weight: 64 7 kg (142 lb 9 6 oz)   Height: 5' (1 524 m)     Body mass index is 27 85 kg/m²      BP Readings from Last 3 Encounters:   12/03/18 118/70   01/04/18 120/74   12/27/17 134/96       Wt Readings from Last 3 Encounters:   12/03/18 64 7 kg (142 lb 9 6 oz)   01/04/18 64 6 kg (142 lb 8 oz)   12/26/17 64 6 kg (142 lb 6 7 oz) Physical Exam   Constitutional: She is oriented to person, place, and time  She appears well-developed and well-nourished  No distress  HENT:   Right Ear: External ear normal    Left Ear: External ear normal    Mouth/Throat: Oropharynx is clear and moist  No oropharyngeal exudate  TM clear   Eyes: Conjunctivae are normal  No scleral icterus  Neck: Neck supple  No thyromegaly present  Cardiovascular: Normal rate, regular rhythm and normal heart sounds  No murmur heard  No carotid bruit   Pulmonary/Chest: Effort normal and breath sounds normal  No respiratory distress  Abdominal: Soft  There is no tenderness  Musculoskeletal: She exhibits no edema  Lymphadenopathy:     She has no cervical adenopathy  Neurological: She is alert and oriented to person, place, and time  Psychiatric: She has a normal mood and affect  Her behavior is normal        ALLERGIES:  Allergies   Allergen Reactions    Bee Venom     Morphine     Penicillins     Sulfa Antibiotics GI Intolerance       Current Medications     Current Outpatient Prescriptions   Medication Sig Dispense Refill    aspirin 81 MG tablet Take 81 mg by mouth daily   Calcium 500 MG tablet Take 1 tablet by mouth daily        estradiol (ESTRACE) 1 mg tablet Take 1 mg by mouth daily      Flaxseed, Linseed, (FLAX SEED OIL) 1300 MG CAPS Take 1 capsule by mouth daily      magnesium oxide (MAG-OX) 400 mg Take 1 tablet by mouth daily (Patient taking differently: Take 400 mg by mouth 3 (three) times a week  ) 30 tablet 0     No current facility-administered medications for this visit            Health Maintenance     See other note today re clinical AWV info             [facility] recent [facility] [address]   ( others may be available in Care Everywhere / Media sections)  Lab Results   Component Value Date    WBC 6 27 12/27/2017    HGB 13 4 12/27/2017    HCT 39 9 12/27/2017     12/27/2017    CHOL 185 11/16/2016    TRIG 99 11/16/2016    HDL 67 11/16/2016    LDLDIRECT 108 11/16/2016    ALT 22 11/16/2016    AST 20 11/16/2016     11/09/2017    K 3 7 12/27/2017     12/27/2017    CREATININE 0 65 12/27/2017    BUN 8 12/27/2017    CO2 27 12/27/2017     No results found for: [address]  Lab Results   Component Value Date    [id number] 3 25 11/09/2017       Orders Placed This Encounter   Procedures    PREFERRED: influenza vaccine, [id number], quadrivalent, recombinant, PF, 0 5 mL, for patients 18 yr+ (FLUBLOK)    Ambulatory referral to [address] [facility], DO

## 2018-12-19 ENCOUNTER — HOSPITAL ENCOUNTER (OUTPATIENT)
Dept: MAMMOGRAPHY | Facility: MEDICAL CENTER | Age: 53
Discharge: HOME | End: 2018-12-19
Payer: COMMERCIAL

## 2018-12-19 VITALS — WEIGHT: 142 LBS | BODY MASS INDEX: 27.88 KG/M2 | HEIGHT: 60 IN

## 2018-12-19 DIAGNOSIS — Z12.39 SCREENING BREAST EXAMINATION: ICD-10-CM

## 2018-12-19 PROCEDURE — 77067 SCR MAMMO BI INCL CAD: CPT

## 2019-06-12 ENCOUNTER — TRANSCRIBE ORDERS (OUTPATIENT)
Dept: ADMINISTRATIVE | Facility: HOSPITAL | Age: 54
End: 2019-06-12

## 2019-06-12 DIAGNOSIS — Z12.31 VISIT FOR SCREENING MAMMOGRAM: Primary | ICD-10-CM

## 2019-09-25 ENCOUNTER — IMMUNIZATIONS (OUTPATIENT)
Dept: FAMILY MEDICINE CLINIC | Facility: CLINIC | Age: 54
End: 2019-09-25
Payer: COMMERCIAL

## 2019-09-25 DIAGNOSIS — Z23 NEED FOR INFLUENZA VACCINATION: Primary | ICD-10-CM

## 2019-09-25 PROCEDURE — 90682 RIV4 VACC RECOMBINANT DNA IM: CPT

## 2019-09-25 PROCEDURE — 90471 IMMUNIZATION ADMIN: CPT

## 2019-12-20 ENCOUNTER — OFFICE VISIT (OUTPATIENT)
Dept: FAMILY MEDICINE CLINIC | Facility: CLINIC | Age: 54
End: 2019-12-20
Payer: COMMERCIAL

## 2019-12-20 VITALS
DIASTOLIC BLOOD PRESSURE: 80 MMHG | HEIGHT: 60 IN | RESPIRATION RATE: 16 BRPM | OXYGEN SATURATION: 98 % | WEIGHT: 141 LBS | BODY MASS INDEX: 27.68 KG/M2 | SYSTOLIC BLOOD PRESSURE: 120 MMHG | HEART RATE: 76 BPM

## 2019-12-20 DIAGNOSIS — Z12.11 COLON CANCER SCREENING: ICD-10-CM

## 2019-12-20 DIAGNOSIS — I73.00 RAYNAUD'S PHENOMENON WITHOUT GANGRENE: ICD-10-CM

## 2019-12-20 DIAGNOSIS — Z13.220 SCREENING, LIPID: ICD-10-CM

## 2019-12-20 DIAGNOSIS — E66.3 OVERWEIGHT WITH BODY MASS INDEX (BMI) 25.0-29.9: ICD-10-CM

## 2019-12-20 DIAGNOSIS — Z00.00 MEDICARE ANNUAL WELLNESS VISIT, SUBSEQUENT: Primary | ICD-10-CM

## 2019-12-20 PROCEDURE — 3008F BODY MASS INDEX DOCD: CPT | Performed by: FAMILY MEDICINE

## 2019-12-20 PROCEDURE — G0439 PPPS, SUBSEQ VISIT: HCPCS | Performed by: FAMILY MEDICINE

## 2019-12-20 PROCEDURE — 3725F SCREEN DEPRESSION PERFORMED: CPT | Performed by: FAMILY MEDICINE

## 2019-12-20 PROCEDURE — 1036F TOBACCO NON-USER: CPT | Performed by: FAMILY MEDICINE

## 2019-12-20 NOTE — PROGRESS NOTES
BMI Counseling: Body mass index is 27 54 kg/m²  The BMI is above normal  Nutrition recommendations include decreasing portion sizes, encouraging healthy choices of fruits and vegetables and moderation in carbohydrate intake  Exercise recommendations include exercising 3-5 times per week  Depression Screening and Follow-up Plan: Patient's depression screening was positive with a PHQ-2 score of 0  Clincally patient does not have depression  No treatment is required  Romain ESE  [facility]  [address]  [address] [place], [place], [id number] MEDICARE SUBSEQUENT VISIT NOTE ONLY      NAME: Jimi Weldon  AGE: 49 y o  SEX: female  : 1965   MRN: [id number]    DATE: 2019  TIME: 12:17 PM    Assessment and Plan     Problem List Items Addressed This Visit        Other    Raynaud's phenomenon    Relevant Orders    Comprehensive metabolic panel      Other Visit Diagnoses     Medicare annual wellness visit, subsequent    -  Primary    BMI 27        Colon cancer screening        Relevant Orders    Ambulatory referral to General Surgery    Screening, lipid        Relevant Orders    Lipid panel          Medicare Wellness Counseling/ Discussion    Patient Instructions     Reviewed health history along with medication, her only prescription medication is estradiol via gyn, does use aspirin 81 mg daily regarding history Raynaud's, no issues with that  Has had no further seizure-like activity  No issues with kidney stones  We did review previous blood work, 2017 cbc/BMP/ TSH ok  She is up to date with Lipid screening  2016 Chol 185 w HDL 67,  -  Await redo  She is up to date with Diabetes screening  Await redo         Immunization History   Administered Date(s) Administered    Influenza Quadrivalent Preservative Free 3 years and older IM 10/16/2014, 10/31/2017    Influenza Quadrivalent, 6-35 Months IM 10/20/2015, 10/27/2016  Influenza TIV (IM) 10/01/2012, 10/10/2013    Influenza, recombinant, quadrivalent,injectable, preservative free 12/03/2018, 09/25/2019       She does do yearly Flu shot  Tdap/tetanus shot will be done at a future date  (done every 10 yrs for superficial cuts, every 5 yrs for deep wounds)      Was never a smoker     Regarding Colon Cancer screening, we discussed Colonoscopy vs ColoGuard is indicated starting at age 36-53  Will set up Colonoscopy screening w/ Dr Jess Rapp who her family sees  She does see her Gynecologist routinely  Dr Sagrario Hess     Discussed screening Mammogram, this is up-to-date  Redoes soon  Continue to try to watch healthy diet, exercise routinely as is -   Uses light wts, does exercise bike  We will see her again in 12 months, sooner as needed  Chief Complaint     Chief Complaint   Patient presents with    Medicare Wellness Visit       History of Present Illness     Kyara Layne is a 49y o -year-old female who is in today for a routine yearly check, she has been feeling well, she has no complaints here today  She does try to exercise at home with light weights, exercise bike  She is in today accompanied by her adoptive mother as usual     Review of Systems     Review of Systems   Constitutional: Negative for appetite change, fatigue, fever and unexpected weight change  HENT: Negative for sore throat and trouble swallowing  Respiratory: Negative for cough, chest tightness and shortness of breath  Cardiovascular: Negative for chest pain, palpitations and leg swelling  Gastrointestinal: Negative for abdominal pain and blood in stool  No acid reflux     No change in bowel   Genitourinary: Negative for dysuria and hematuria  Sees gyn, is on ERT   Neurological: Negative for dizziness, light-headedness and headaches  Hematological: Does not bruise/bleed easily  Psychiatric/Behavioral: Negative for behavioral problems and confusion  Active Problem List     Patient Active Problem List   Diagnosis    Hx of Seizure-like activity     LUE numbness - improved    Right nephrolithiasis    Raynaud's phenomenon    Migraine headache    Maxillary sinus cyst    Learning difficulty    Endometriosis    Allergic rhinitis       Past Medical History:  Past Medical History:   Diagnosis Date    Acute cystitis with hematuria     Last assessed: 3/1/16    Carpal tunnel syndrome of left wrist 12/2017    Confusion and disorientation 12/27/2017    Endometriosis     Kidney stone     Learning disabilities     Migraine headache     Raynaud's phenomenon     Reactive airway disease     Syncope 2014    Last assessed: 10/10/13       Past Surgical History:  Past Surgical History:   Procedure Laterality Date    EXPLORATORY LAPAROTOMY      HYSTERECTOMY  08/2014    Total Abdominal    OOPHORECTOMY         Family History:  Family History   Adopted: Yes       Social History:  Social History     Tobacco Use    Smoking status: Never Smoker    Smokeless tobacco: Never Used   Substance Use Topics    Alcohol use: No       Objective     Vitals:    12/20/19 0933   BP: 120/80   Pulse: 76   Resp: 16   SpO2: 98%   Weight: 64 kg (141 lb)   Height: 5' (1 524 m)     Body mass index is 27 54 kg/m²  BP Readings from Last 3 Encounters:   12/20/19 120/80   12/03/18 118/70   01/04/18 120/74       Wt Readings from Last 3 Encounters:   12/20/19 64 kg (141 lb)   12/19/18 64 4 kg (142 lb)   12/03/18 64 7 kg (142 lb 9 6 oz)       Physical Exam   Constitutional: She is oriented to person, place, and time  She appears well-developed and well-nourished  No distress  HENT:   Right Ear: External ear normal    Left Ear: External ear normal    Mouth/Throat: Oropharynx is clear and moist  No oropharyngeal exudate  TM clear   Eyes: Conjunctivae are normal  No scleral icterus  Cardiovascular: Normal rate, regular rhythm and normal heart sounds  No murmur heard    No carotid bruit   Pulmonary/Chest: Effort normal and breath sounds normal  No respiratory distress  Abdominal: Soft  There is no tenderness  Musculoskeletal: She exhibits no edema  Lymphadenopathy:     She has no cervical adenopathy  Neurological: She is alert and oriented to person, place, and time  Psychiatric: She has a normal mood and affect  Her behavior is normal        ALLERGIES:  Allergies   Allergen Reactions    Bee Venom     Morphine     Penicillins     Sulfa Antibiotics GI Intolerance    Tetracycline        Current Medications     Current Outpatient Medications   Medication Sig Dispense Refill    aspirin 81 MG tablet Take 81 mg by mouth daily   Calcium 500 MG tablet Take 1 tablet by mouth daily        estradiol (ESTRACE) 1 mg tablet Take 1 mg by mouth daily      Flaxseed, Linseed, (FLAX SEED OIL) 1300 MG CAPS Take 1 capsule by mouth daily      magnesium oxide (MAG-OX) 400 mg Take 1 tablet by mouth daily (Patient taking differently: Take 400 mg by mouth 3 (three) times a week  ) 30 tablet 0     No current facility-administered medications for this visit            Health Maintenance     See other note today re clinical AWV info             Most recent labs available from [address]   ( others may be available in Care Everywhere / Media sections)  Lab Results   Component Value Date    WBC 6 27 12/27/2017    HGB 13 4 12/27/2017    HCT 39 9 12/27/2017     12/27/2017    CHOL 185 11/16/2016    TRIG 99 11/16/2016    HDL 67 11/16/2016    LDLDIRECT 108 11/16/2016    ALT 22 11/16/2016    AST 20 11/16/2016     11/09/2017    K 3 7 12/27/2017     12/27/2017    CREATININE 0 65 12/27/2017    BUN 8 12/27/2017    CO2 27 12/27/2017       Orders Placed This Encounter   Procedures    Comprehensive metabolic panel    Lipid panel    Ambulatory referral to [address], [place]

## 2019-12-20 NOTE — PATIENT INSTRUCTIONS
Reviewed health history along with medication, her only prescription medication is estradiol via gyn, does use aspirin 81 mg daily regarding history Raynaud's, no issues with that  Has had no further seizure-like activity  No issues with kidney stones  We did review previous blood work, 2017 cbc/BMP/ TSH ok  She is up to date with Lipid screening  2016 Chol 185 w HDL 67,  -  Await redo  She is up to date with Diabetes screening  Await redo  Immunization History   Administered Date(s) Administered    Influenza Quadrivalent Preservative Free 3 years and older IM 10/16/2014, 10/31/2017    Influenza Quadrivalent, 6-35 Months IM 10/20/2015, 10/27/2016    Influenza TIV (IM) 10/01/2012, 10/10/2013    Influenza, recombinant, quadrivalent,injectable, preservative free 12/03/2018, 09/25/2019       She does do yearly Flu shot  Tdap/tetanus shot will be done at a future date  (done every 10 yrs for superficial cuts, every 5 yrs for deep wounds)      Was never a smoker     Regarding Colon Cancer screening, we discussed Colonoscopy vs ColoGuard is indicated starting at age 36-53  Will set up Colonoscopy screening w/ Dr Kendal Sin who her family sees  She does see her Gynecologist routinely  Dr Pastor Patel     Discussed screening Mammogram, this is up-to-date  Redoes soon  Continue to try to watch healthy diet, exercise routinely as is -   Uses light wts, does exercise bike  We will see her again in 12 months, sooner as needed

## 2019-12-20 NOTE — PROGRESS NOTES
Assessment and Plan:     Problem List Items Addressed This Visit        Other    Raynaud's phenomenon    Relevant Orders    Comprehensive metabolic panel      Other Visit Diagnoses     Medicare annual wellness visit, subsequent    -  Primary    BMI 27        Colon cancer screening        Relevant Orders    Ambulatory referral to General Surgery    Screening, lipid        Relevant Orders    Lipid panel           Preventive health issues were discussed with patient, and age appropriate screening tests were ordered as noted in patient's After Visit Summary  Personalized health advice and appropriate referrals for health education or preventive services given if needed, as noted in patient's After Visit Summary      See other note today regarding provider information     History of Present Illness:     Patient presents for Medicare Annual Wellness visit    Patient Care Team:  Carroll Dickinson DO as PCP - General (Family Medicine)  Wilbert Tate MD (Obstetrics and Gynecology)     Problem List:     Patient Active Problem List   Diagnosis    Hx of Seizure-like activity     LUE numbness - improved    Right nephrolithiasis    Raynaud's phenomenon    Migraine headache    Maxillary sinus cyst    Learning difficulty    Endometriosis    Allergic rhinitis      Past Medical and Surgical History:     Past Medical History:   Diagnosis Date    Acute cystitis with hematuria     Last assessed: 3/1/16    Carpal tunnel syndrome of left wrist 12/2017    Confusion and disorientation 12/27/2017    Endometriosis     Kidney stone     Learning disabilities     Migraine headache     Raynaud's phenomenon     Reactive airway disease     Syncope 2014    Last assessed: 10/10/13     Past Surgical History:   Procedure Laterality Date    EXPLORATORY LAPAROTOMY      HYSTERECTOMY  08/2014    Total Abdominal    OOPHORECTOMY        Family History:     Family History   Adopted: Yes      Social History:     Social History Socioeconomic History    Marital status: Single     Spouse name: None    Number of children: None    Years of education: None    Highest education level: None   Occupational History    None   Social Needs    Financial resource strain: None    Food insecurity:     Worry: None     Inability: None    Transportation needs:     Medical: None     Non-medical: None   Tobacco Use    Smoking status: Never Smoker    Smokeless tobacco: Never Used   Substance and Sexual Activity    Alcohol use: No    Drug use: No    Sexual activity: Not Currently   Lifestyle    Physical activity:     Days per week: None     Minutes per session: None    Stress: None   Relationships    Social connections:     Talks on phone: None     Gets together: None     Attends Muslim service: None     Active member of club or organization: None     Attends meetings of clubs or organizations: None     Relationship status: None    Intimate partner violence:     Fear of current or ex partner: None     Emotionally abused: None     Physically abused: None     Forced sexual activity: None   Other Topics Concern    None   Social History Narrative    None       Medications and Allergies:     Current Outpatient Medications   Medication Sig Dispense Refill    aspirin 81 MG tablet Take 81 mg by mouth daily   Calcium 500 MG tablet Take 1 tablet by mouth daily        estradiol (ESTRACE) 1 mg tablet Take 1 mg by mouth daily      Flaxseed, Linseed, (FLAX SEED OIL) 1300 MG CAPS Take 1 capsule by mouth daily      magnesium oxide (MAG-OX) 400 mg Take 1 tablet by mouth daily (Patient taking differently: Take 400 mg by mouth 3 (three) times a week  ) 30 tablet 0     No current facility-administered medications for this visit        Allergies   Allergen Reactions    Bee Venom     Morphine     Penicillins     Sulfa Antibiotics GI Intolerance    Tetracycline       Immunizations:     Immunization History   Administered Date(s) Administered   [address] [place] [address] Influenza Quadrivalent Preservative Free 3 years and older IM 10/16/2014, 10/31/2017    Influenza Quadrivalent, 6-35 Months IM 10/20/2015, 10/27/2016    Influenza TIV (IM) 10/01/2012, 10/10/2013    Influenza, recombinant, quadrivalent,injectable, preservative free 12/03/2018, 09/25/2019      Health Maintenance:         Topic Date Due    Cervical Cancer Screening  05/04/1987    MAMMOGRAM  12/19/2019    CRC Screening: Colonoscopy  10/27/2026         Topic Date Due    DTaP,Tdap,and Td Vaccines (1 - Tdap) 05/04/1977      Medicare Health Risk Assessment:     /80   Pulse 76   Resp 16   Ht 5' (1 524 m)   Wt 64 kg (141 lb)   SpO2 98%   BMI 27 54 kg/m²      Ahmet Valadez is here for her Subsequent Wellness visit  Health Risk Assessment:   Patient rates overall health as very good  Patient feels that their physical health rating is much better  Eyesight was rated as same  Hearing was rated as same  Patient feels that their emotional and mental health rating is much better  Pain experienced in the last 7 days has been none  Depression Screening:   PHQ-2 Score: 0      Fall Risk Screening: In the past year, patient has experienced: no history of falling in past year      Urinary Incontinence Screening:   Patient has not leaked urine accidently in the last six months  Home Safety:  Patient does not have trouble with stairs inside or outside of their home  Patient has working smoke alarms and has no working carbon monoxide detector  Home safety hazards include: none  Nutrition:   Current diet is Regular  Medications:   Patient is currently taking over-the-counter supplements  OTC medications include: see medication list  Patient is able to manage medications  Activities of Daily Living (ADLs)/Instrumental Activities of Daily Living (IADLs):   Walk and transfer into and out of bed and chair?: Yes  Dress and groom yourself?: Yes    Bathe or shower yourself?: Yes    Feed yourself?  Yes  Do your laundry/housekeeping?: Yes  Manage your money, pay your bills and track your expenses?: Yes  Make your own meals?: Yes    Do your own shopping?: Yes    Previous Hospitalizations:   Any hospitalizations or ED visits within the last 12 months?: No      Advance Care Planning:   Living will: No    Durable POA for healthcare: No    Advanced directive: No      PREVENTIVE SCREENINGS      Cardiovascular Screening:    General: Screening Current      Colorectal Cancer Screening:     General: Screening Current      Breast Cancer Screening:     General: Screening Current      Tonia Lockwood, DO

## 2019-12-30 ENCOUNTER — HOSPITAL ENCOUNTER (OUTPATIENT)
Dept: MAMMOGRAPHY | Facility: MEDICAL CENTER | Age: 54
Discharge: HOME | End: 2019-12-30
Payer: COMMERCIAL

## 2019-12-30 VITALS — WEIGHT: 141 LBS | BODY MASS INDEX: 27.68 KG/M2 | HEIGHT: 60 IN

## 2019-12-30 DIAGNOSIS — Z12.31 VISIT FOR SCREENING MAMMOGRAM: ICD-10-CM

## 2019-12-30 PROCEDURE — 77067 SCR MAMMO BI INCL CAD: CPT

## 2020-02-27 ENCOUNTER — TRANSCRIBE ORDERS (OUTPATIENT)
Dept: ADMINISTRATIVE | Facility: HOSPITAL | Age: 55
End: 2020-02-27

## 2020-02-27 DIAGNOSIS — Z12.31 ENCOUNTER FOR SCREENING MAMMOGRAM FOR MALIGNANT NEOPLASM OF BREAST: Primary | ICD-10-CM

## 2020-03-18 ENCOUNTER — TELEPHONE (OUTPATIENT)
Dept: SURGERY | Facility: CLINIC | Age: 55
End: 2020-03-18

## 2020-03-18 NOTE — TELEPHONE ENCOUNTER
Called and spoke to patient  She's aware of cancellation of upcoming appointment  She's aware that office will call her at a later date regarding reschedule  She stated that this was fine

## 2020-06-09 ENCOUNTER — TELEPHONE (OUTPATIENT)
Dept: SURGERY | Facility: CLINIC | Age: 55
End: 2020-06-09

## 2020-06-29 ENCOUNTER — CONSULT (OUTPATIENT)
Dept: SURGERY | Facility: CLINIC | Age: 55
End: 2020-06-29
Payer: COMMERCIAL

## 2020-06-29 VITALS
SYSTOLIC BLOOD PRESSURE: 118 MMHG | DIASTOLIC BLOOD PRESSURE: 68 MMHG | TEMPERATURE: 97 F | BODY MASS INDEX: 27.76 KG/M2 | HEIGHT: 60 IN | WEIGHT: 141.38 LBS

## 2020-06-29 DIAGNOSIS — Z12.11 COLON CANCER SCREENING: Primary | ICD-10-CM

## 2020-06-29 PROCEDURE — 99203 OFFICE O/P NEW LOW 30 MIN: CPT | Performed by: FAMILY MEDICINE

## 2020-06-29 PROCEDURE — 3008F BODY MASS INDEX DOCD: CPT | Performed by: FAMILY MEDICINE

## 2020-06-29 NOTE — H&P (VIEW-ONLY)
Assessment/Plan:   Jennifer Dickinson is a 48 y  o female who is here for a:     First Screening Colonoscopy  Plan: Colonoscopy for: Screening for Colon Cancer    Previous Colonoscopy and  Location of polyps if any:   none    Preoperative Clearance: None  Patient is informed to hold aspirin 7 days prior to procedure and understands the bowel prep instructions  She is also in agreement to get COVID-19 testing 5 days prior to the procedure    ______________________________________________________    HPI:  Jennifer Dickinson is a 48 y  o female who was referred for evaluation of    First Screening  Currently: Patient states she feels healthy  She has a past surgical history of hysterectomy 6 years ago for endometriosis  She takes a daily baby aspirin for raynaud's phenomenon  She does not have any other pertinent past medical history  Symptoms include:  no abdominal pain, change in bowel habits, or black or bloody stools  Family history of colon cancer: patient is adopted and biological family history is unknown however her adopted father currently has metastatic colon cancer       Anticoagulation: ASA    LABS:    Lab Results   Component Value Date    WBC 6 27 12/27/2017    HGB 13 4 12/27/2017    HCT 39 9 12/27/2017    MCV 94 12/27/2017     12/27/2017     Lab Results   Component Value Date     11/09/2017    K 3 7 12/27/2017     12/27/2017    CO2 27 12/27/2017    BUN 8 12/27/2017    CREATININE 0 65 12/27/2017    CALCIUM 8 8 12/27/2017    AST 20 11/16/2016    ALT 22 11/16/2016    ALKPHOS 61 11/16/2016    PROT 7 4 11/16/2016    BILITOT 0 5 11/16/2016    EGFR 103 12/27/2017     No results found for: HGBA1C  No results found for: INR, PROTIME    No orders to display     ROS:  General ROS: negative for - chills, fatigue, fever or night sweats, weight loss  Respiratory ROS: no cough, shortness of breath, or wheezing  Cardiovascular ROS: no chest pain or dyspnea on exertion  Genito-Urinary ROS: no dysuria, trouble voiding, or hematuria  GI ROS: see HPI  Skin ROS: no new rashes or lesions    GYN ROS: see HPI, no new GYN history or bleeding noted    Imaging: No new pertinent imaging studies  Patient Active Problem List   Diagnosis    Hx of Seizure-like activity     LUE numbness - improved    Right nephrolithiasis    Raynaud's phenomenon    Migraine headache    Maxillary sinus cyst    Learning difficulty    Endometriosis    Allergic rhinitis     Allergies:  Bee venom; Morphine; Penicillins; Sulfa antibiotics; and Tetracycline      Current Outpatient Medications:     aspirin 81 MG tablet, Take 81 mg by mouth daily  , Disp: , Rfl:     Calcium 500 MG tablet, Take 1 tablet by mouth daily  , Disp: , Rfl:     estradiol (ESTRACE) 1 mg tablet, Take 1 mg by mouth daily, Disp: , Rfl:     Flaxseed, Linseed, (FLAX SEED OIL) 1300 MG CAPS, Take 1 capsule by mouth daily, Disp: , Rfl:     magnesium oxide (MAG-OX) 400 mg, Take 1 tablet by mouth daily (Patient taking differently: Take 400 mg by mouth 3 (three) times a week  ), Disp: 30 tablet, Rfl: 0    Past Medical History:   Diagnosis Date    Acute cystitis with hematuria     Last assessed: 3/1/16    Carpal tunnel syndrome of left wrist 12/2017    Confusion and disorientation 12/27/2017    Endometriosis     Kidney stone     Learning disabilities     Migraine headache     Raynaud's phenomenon     Reactive airway disease     Syncope 2014    Last assessed: 10/10/13     Past Surgical History:   Procedure Laterality Date    EXPLORATORY LAPAROTOMY      HYSTERECTOMY  08/2014    Total Abdominal    OOPHORECTOMY       Family History   Adopted: Yes       Social History     Socioeconomic History    Marital status: Single     Spouse name: None    Number of children: None    Years of education: None    Highest education level: None   Occupational History    None   Social Needs    Financial resource strain: None    Food insecurity:     Worry: None Inability: None    Transportation needs:     Medical: None     Non-medical: None   Tobacco Use    Smoking status: Never Smoker    Smokeless tobacco: Never Used   Substance and Sexual Activity    Alcohol use: No    Drug use: No    Sexual activity: Not Currently   Lifestyle    Physical activity:     Days per week: None     Minutes per session: None    Stress: None   Relationships    Social connections:     Talks on phone: None     Gets together: None     Attends [employer] service: None     Active member of club or organization: None     Attends meetings of clubs or organizations: None     Relationship status: None    Intimate partner violence:     Fear of current or ex partner: None     Emotionally abused: None     Physically abused: None     Forced sexual activity: None   Other Topics Concern    None   Social History Narrative    None     Exam:   Vitals:    06/29/20 1022   BP: 118/68   Temp: (!) 97 °F (36 1 °C)     _____________________________________________________    PHYSICAL EXAM  General Appearance:    Alert, cooperative, no distress     Head:    Normocephalic without obvious abnormality   Eyes:    PERRL, conjunctiva/corneas clear, EOM's intact        Lungs:     Clear to auscultation bilaterally, no wheezing or rhonchi   Heart:    Regular rate and rhythm, S1 and S2 normal, no murmur   Abdomen:     Soft, non tender, BS normal    Extremities:   Extremities normal      Hugh Cage MD  Date: 6/29/2020 Time: 10:47 AM     This report has been generated by a voice recognition software system  Therefore, there may be syntax, spelling, and/or grammatical errors  Please call if you've any questions  This  encounter has been billed by a non certified Coder  Informed consent for procedure was personally discussed, reviewed, and signed by Dr Ventura Coats  Discussion by Dr Ventura Coats was carried out regarding risks, benefits, and alternatives with the patient   Risks include but are not limited to: bleeding, infection, and delayed wound healing or an open wound, pulmonary embolus, leaks from bowel or bile ducts or other viscus, transfusions, death  Discussed in further detail the more common complications and their rates of occurrence   was used if necessary  Patient expressed understanding of the issues discussed and wished/consented to proceed  All questions were answered by Dr Nelson Corporal  Discussion performed between patient and the provider signing below  Signature:    Selena Estevez MD  Date: 6/29/2020 Time: 10:47 AM

## 2020-07-13 ENCOUNTER — TELEPHONE (OUTPATIENT)
Dept: SURGERY | Facility: CLINIC | Age: 55
End: 2020-07-13

## 2020-07-13 DIAGNOSIS — Z12.11 COLON CANCER SCREENING: ICD-10-CM

## 2020-07-13 PROCEDURE — U0003 INFECTIOUS AGENT DETECTION BY NUCLEIC ACID (DNA OR RNA); SEVERE ACUTE RESPIRATORY SYNDROME CORONAVIRUS 2 (SARS-COV-2) (CORONAVIRUS DISEASE [COVID-19]), AMPLIFIED PROBE TECHNIQUE, MAKING USE OF HIGH THROUGHPUT TECHNOLOGIES AS DESCRIBED BY CMS-2020-01-R: HCPCS

## 2020-07-14 LAB
INPATIENT: NORMAL
SARS-COV-2 RNA SPEC QL NAA+PROBE: NOT DETECTED

## 2020-07-17 ENCOUNTER — TELEPHONE (OUTPATIENT)
Dept: SURGERY | Facility: CLINIC | Age: 55
End: 2020-07-17

## 2020-07-17 NOTE — TELEPHONE ENCOUNTER
Called and spoke to patient  She states that she understands directions for prep for Tuesdays appointment  She has a ride to and from procedure  She's aware the hospital will call her the night before with reporting time  She's aware that her COVID test was negative  She'll call the office if she needs

## 2020-07-20 ENCOUNTER — ANESTHESIA EVENT (OUTPATIENT)
Dept: GASTROENTEROLOGY | Facility: HOSPITAL | Age: 55
End: 2020-07-20

## 2020-07-21 ENCOUNTER — ANESTHESIA (OUTPATIENT)
Dept: GASTROENTEROLOGY | Facility: HOSPITAL | Age: 55
End: 2020-07-21

## 2020-07-21 ENCOUNTER — HOSPITAL ENCOUNTER (OUTPATIENT)
Dept: GASTROENTEROLOGY | Facility: HOSPITAL | Age: 55
Setting detail: OUTPATIENT SURGERY
Discharge: HOME | End: 2020-07-21
Attending: SURGERY | Admitting: SURGERY
Payer: COMMERCIAL

## 2020-07-21 VITALS
OXYGEN SATURATION: 100 % | TEMPERATURE: 97.4 F | RESPIRATION RATE: 16 BRPM | DIASTOLIC BLOOD PRESSURE: 88 MMHG | SYSTOLIC BLOOD PRESSURE: 136 MMHG | HEART RATE: 63 BPM

## 2020-07-21 DIAGNOSIS — Z12.11 COLON CANCER SCREENING: ICD-10-CM

## 2020-07-21 PROCEDURE — 88305 TISSUE EXAM BY PATHOLOGIST: CPT | Performed by: PATHOLOGY

## 2020-07-21 PROCEDURE — 45385 COLONOSCOPY W/LESION REMOVAL: CPT | Performed by: SURGERY

## 2020-07-21 RX ORDER — PROPOFOL 10 MG/ML
INJECTION, EMULSION INTRAVENOUS AS NEEDED
Status: DISCONTINUED | OUTPATIENT
Start: 2020-07-21 | End: 2020-07-21 | Stop reason: SURG

## 2020-07-21 RX ORDER — SODIUM CHLORIDE 9 MG/ML
125 INJECTION, SOLUTION INTRAVENOUS CONTINUOUS
Status: DISCONTINUED | OUTPATIENT
Start: 2020-07-21 | End: 2020-07-25 | Stop reason: HOSPADM

## 2020-07-21 RX ADMIN — SODIUM CHLORIDE: 0.9 INJECTION, SOLUTION INTRAVENOUS at 10:37

## 2020-07-21 RX ADMIN — PROPOFOL 30 MG: 10 INJECTION, EMULSION INTRAVENOUS at 10:48

## 2020-07-21 RX ADMIN — SODIUM CHLORIDE 125 ML/HR: 0.9 INJECTION, SOLUTION INTRAVENOUS at 09:43

## 2020-07-21 RX ADMIN — PROPOFOL 150 MG: 10 INJECTION, EMULSION INTRAVENOUS at 10:41

## 2020-07-21 RX ADMIN — PROPOFOL 50 MG: 10 INJECTION, EMULSION INTRAVENOUS at 10:44

## 2020-07-21 RX ADMIN — PROPOFOL 20 MG: 10 INJECTION, EMULSION INTRAVENOUS at 10:53

## 2020-07-21 NOTE — INTERVAL H&P NOTE
H&P reviewed  After examining the patient I find no changes in the patients condition since the H&P had been written      Vitals:    07/21/20 0927   BP: 134/75   Pulse: 69   Resp: 16   Temp: (!) 97 4 °F (36 3 °C)   SpO2: 100%

## 2020-07-21 NOTE — DISCHARGE INSTRUCTIONS
Mani Phelps  Endoscopy Post-Operative Instructions  Dr George Baron MD, FACS    Procedure: Colonoscopy    Findings:  Diverticulosis and Colon Polyp(s)    Follow-Up: You will need a repeat Endoscopy in (generally)3 years  Will await final pathology report for final determination of number of years until your follow up endoscopy, if you had polyps on this exam   Different types of polyps require different lengths of follow up surveillance  Please call our office or your primary doctor's office if you have any questions, once the report is returned  You should have an endoscopy sooner than recommended if you have any symptoms of bleeding or change in stools or other concerns  You will receive a call from our office with your results, in addition to the the preliminary results you received today  You will usually receive a follow-up letter from our office in 1-2 weeks  Call the office if you do not hear from us  You are welcome to also schedule an office visit if desired to discuss the results further  It is your responsibility to contact our office for results in 1- 2 weeks if you do not hear from us  If a follow up endoscopy is needed, you are responsible for arranging that follow up appointment at the appropriate time  The office may or may not issue a reminder at that future time  Please take responsibility for your own follow up healthcare  Diet: Eat a light snack first, and then resume your previous diet  Call the office if you have unusual fevers, chills, nausea or vomiting or abdominal pain  Report to the emergency room with these are severe in nature  Activity: Do not drive a car, operate machinery, or sign legal documents for 24 hours after your procedure  Normal activity may be resumed on the day following the procedure       Call the office at [phone] for any of the following: Severe abdominal pain, significant rectal bleeding, chills, or fever above 100°, new onset of persistent cough or persistent vomiting  [address]  [place] [address] [facility]  Phone: [phone]                Colorectal Polyps   WHAT YOU NEED TO KNOW:   Colorectal polyps are small growths of tissue in the lining of the colon and rectum  Most polyps are hyperplastic polyps and are usually benign (noncancerous)  Certain types of polyps, called adenomatous polyps, may turn into cancer  DISCHARGE INSTRUCTIONS:   Follow up with your healthcare provider or gastroenterologist as directed: You may need to return for more tests, such as another colonoscopy  Write down your questions so you remember to ask them during your visits  Reduce your risk for colorectal polyps:   · Eat a variety of healthy foods:  Healthy foods include fruit, vegetables, whole-grain breads, low-fat dairy products, beans, lean meat, and fish  Ask if you need to be on a special diet  · Maintain a healthy weight:  Ask your healthcare provider if you need to lose weight and how much you need to lose  Ask for help with a weight loss program     · Exercise:  Begin to exercise slowly and do more as you get stronger  Talk with your healthcare provider before you start an exercise program      · Limit alcohol:  Your risk for polyps increases the more you drink  · Do not smoke: If you smoke, it is never too late to quit  Ask for information about how to stop  For support and more information:   · Rachel Cant[facility]) [address], [place] [address]  Phone: [phone]  Web Address: www digestive  niddk nih gov  Contact your healthcare provider or gastroenterologist if:   · You have a fever  · You have chills, a cough, or feel weak and achy  · You have abdominal pain that does not go away or gets worse after you take medicine  · Your abdomen is swollen       · You are losing weight without trying  · You have questions or concerns about your condition or care  Seek care immediately or call 911 if:   · You have sudden shortness of breath  · You have a fast heart rate, fast breathing, or are too dizzy to stand up  · You have severe abdominal pain  · You see blood in your bowel movement  © 2017 [address] Information is for End User's use only and may not be sold, redistributed or otherwise used for commercial purposes  All illustrations and images included in CareNotes® are the copyrighted property of [employer]  or Nain Johns  The above information is an  only  It is not intended as medical advice for individual conditions or treatments  Talk to your doctor, nurse or pharmacist before following any medical regimen to see if it is safe and effective for you  Diverticulosis   WHAT YOU NEED TO KNOW:   What is diverticulosis? Diverticulosis is a condition that causes small pockets called diverticula to form in your intestine  These pockets make it difficult for bowel movements to pass through your digestive system  What causes diverticulosis? Diverticula form when muscles have to work hard to move bowel movements through the intestine  The force causes bulges to form at weak areas in the intestine  This may happen if you eat foods that are low in fiber  Fiber helps give your bowel movements more bulk so they are larger and easier to move through your colon  The following may increase your risk of diverticulosis:  · A history of constipation    · Age 36 or older    · Obesity    · Lack of exercise  What are the signs and symptoms of diverticulosis? Diverticulosis usually does not cause any signs or symptoms  It may cause any of the following in some people:  · Pain or discomfort in your lower abdomen    · Abdominal bloating    · Constipation or diarrhea  How is diverticulosis diagnosed?   Your healthcare provider will examine you and ask about your bowel movements, diet, and symptoms  He or she will also ask about any medical conditions you have or medicines you take  You may need any of the following:  · Blood tests  may be done to check for signs of inflammation  · A barium enema  is an x-ray of your colon that may show diverticula  A tube is put into your anus, and a liquid called barium is put through the tube  Barium is used so that healthcare providers can see your colon more clearly  · Flexible sigmoidoscopy  is a test to look for any changes in your lower intestines and rectum  It may also show the cause of any bleeding or pain  A soft, bendable tube with a light on the end will be put into your anus  It will then be moved forward into your intestine  · A colonoscopy  is used to look at your whole colon  A scope (long bendable tube with a light on the end) is used to take pictures  This test may show diverticula  · A CT scan , or CAT scan, may show diverticula  You may be given contrast liquid before the scan  Tell the healthcare provider if you have ever had an allergic reaction to contrast liquid  How is diverticulosis managed? The goal of treatment is to manage any symptoms you have and prevent other problems such as diverticulitis  Diverticulitis is swelling or infection of the diverticula  Your healthcare provider may recommend any of the following:  · Eat a variety of high-fiber foods  High-fiber foods help you have regular bowel movements  High-fiber foods include cooked beans, fruits, vegetables, and some cereals  Most adults need 25 to 35 grams of fiber each day  Your healthcare provider may recommend that you have more  Ask your healthcare provider how much fiber you need  Increase fiber slowly  You may have abdominal discomfort, bloating, and gas if you add fiber to your diet too quickly  You may need to take a fiber supplement if you are not getting enough fiber from food             · Medicines  to soften your bowel movements may be given  You may also need medicines to treat symptoms such as bloating and pain  · Drink liquids as directed  You may need to drink 2 to 3 liters (8 to 12 cups) of liquids every day  Ask your healthcare provider how much liquid to drink each day and which liquids are best for you  · Apply heat  on your abdomen for 20 to 30 minutes every 2 hours for as many days as directed  Heat helps decrease pain and muscle spasms  How can I help prevent diverticulitis or other symptoms? The following may help decrease your risk for diverticulitis or symptoms, such as bleeding  Talk to your provider about these or other things you can do to prevent problems that may occur with diverticulosis  · Exercise regularly  Ask your healthcare provider about the best exercise plan for you  Exercise can help you have regular bowel movements  Get 30 minutes of exercise on most days of the week  · Maintain a healthy weight  Ask your healthcare provider how much you should weigh  Ask him or her to help you create a weight loss plan if you are overweight  · Do not smoke  Nicotine and other chemicals in cigarettes increase your risk for diverticulitis  Ask your healthcare provider for information if you currently smoke and need help to quit  E-cigarettes or smokeless tobacco still contain nicotine  Talk to your healthcare provider before you use these products  · Ask your healthcare provider if it is safe to take NSAIDs  NSAIDs may increase your risk of diverticulitis  When should I seek immediate care? · You have severe pain on the left side of your lower abdomen  · Your bowel movements are bright or dark red  When should I contact my healthcare provider? · You have a fever and chills  · You feel dizzy or lightheaded  · You have nausea, or you are vomiting  · You have a change in your bowel movements      · You have questions or concerns about your condition or care   CARE AGREEMENT:   You have the right to help plan your care  Learn about your health condition and how it may be treated  Discuss treatment options with your caregivers to decide what care you want to receive  You always have the right to refuse treatment  The above information is an  only  It is not intended as medical advice for individual conditions or treatments  Talk to your doctor, nurse or pharmacist before following any medical regimen to see if it is safe and effective for you  © 2017 [facility] Information is for End User's use only and may not be sold, redistributed or otherwise used for commercial purposes  All illustrations and images included in CareNotes® are the copyrighted property of [employer] Inc  or [employer]  Diverticulosis Diet   WHAT YOU NEED TO KNOW:   What is a diverticulosis diet? A diverticulosis diet includes high-fiber foods  High-fiber foods help you have regular bowel movements  Extra fiber may decrease your risk of forming new diverticula (small pockets) in your intestine  A high-fiber diet may also help prevent diverticulitis  Diverticulitis is a painful condition that occurs when diverticula become inflamed or infected  You do not need to avoid nuts, seeds, corn, or popcorn while you are on a diverticulosis diet  How much fiber do I need? You may need 25 to 35 grams of fiber each day  Ask your dietitian or healthcare provider how much fiber you should have  Increase your intake of fiber slowly  When you eat more fiber, you may have gas and feel bloated  You may need to take a fiber supplement if you do not get enough fiber from food  Drink plenty of liquids as you increase the fiber in your diet  Your dietitian or healthcare provider may recommend 8 eight-ounce cups or more each day  Ask which liquids are best for you  Which foods are high in fiber?    · Foods with at least 4 grams of fiber per serving:      ¨ ? to ½ cup of high-fiber cereal (check the nutrition label on the box)    ¨ ½ cup of blackberries or raspberries    ¨ 4 dried prunes    ¨ 1 cooked artichoke    ¨ ½ cup of cooked legumes, such as lentils, or red, kidney, and ledezma beans    · Foods with 1 to 3 grams of fiber per serving:      ¨ 1 slice of whole-wheat, pumpernickel, or rye bread    ¨ 4 whole-wheat crackers    ¨ ½ cup of cereal with 1 to 3 grams of fiber per serving (check the nutrition label on the box)    ¨ 1 piece of fruit, such as an apple, banana, pear, kiwi, or orange    ¨ 3 dates    ¨ ½ cup of canned apricots, fruit cocktail, peaches, or pears    ¨ ½ cup of raw or cooked vegetables, such as carrots, cauliflower, cabbage, spinach, squash, or corn  When should I contact my healthcare provider? · You have questions about a high-fiber diet  · You have a change in your bowel movements  · You have an upset stomach  · You have a fever  · You have pain in your lower abdomen on the left side  · You have questions about your condition or care  CARE AGREEMENT:   You have the right to help plan your care  Learn about your health condition and how it may be treated  Discuss treatment options with your caregivers to decide what care you want to receive  You always have the right to refuse treatment  The above information is an  only  It is not intended as medical advice for individual conditions or treatments  Talk to your doctor, nurse or pharmacist before following any medical regimen to see if it is safe and effective for you  © 2017 [address] Information is for End User's use only and may not be sold, redistributed or otherwise used for commercial purposes  All illustrations and images included in CareNotes® are the copyrighted property of [employer] M [employer] Inc  or Nain Johns

## 2020-07-21 NOTE — ANESTHESIA PREPROCEDURE EVALUATION
Review of Systems/Medical History  Patient summary reviewed        Cardiovascular  Negative cardio ROS    Pulmonary  Negative pulmonary ROS        GI/Hepatic  Negative GI/hepatic ROS          Kidney stones,        Endo/Other  Negative endo/other ROS      GYN    Hysterectomy,        Hematology  Negative hematology ROS      Musculoskeletal  Negative musculoskeletal ROS        Neurology    Headaches,   Comment: Hx of migraines cant recall last one Psychology   Negative psychology ROS              Physical Exam    Airway    Mallampati score: III  TM Distance: >3 FB  Neck ROM: full     Dental   No notable dental hx     Cardiovascular  Comment: Negative ROS, Rhythm: regular, Rate: normal, Cardiovascular exam normal    Pulmonary  Pulmonary exam normal Breath sounds clear to auscultation,     Other Findings        Anesthesia Plan  ASA Score- 1     Anesthesia Type- general and IV sedation with anesthesia with ASA Monitors  Additional Monitors:   Airway Plan:         Plan Factors-    Induction- intravenous  Postoperative Plan-     Informed Consent- Anesthetic plan and risks discussed with patient

## 2020-07-22 ENCOUNTER — TELEPHONE (OUTPATIENT)
Dept: SURGERY | Facility: CLINIC | Age: 55
End: 2020-07-22

## 2020-07-22 NOTE — TELEPHONE ENCOUNTER
S/P = Colonoscopy = 07/21/2020    Called and spoke with patient  She denies any F/V/N/C and she has had a bowel movement  Patient is aware she will be receiving a call once her pathology has been finalized and verified  Patient is aware to deisy,l the office with any questions or concerns  Path pending

## 2020-07-23 ENCOUNTER — TELEPHONE (OUTPATIENT)
Dept: SURGERY | Facility: CLINIC | Age: 55
End: 2020-07-23

## 2020-07-23 NOTE — TELEPHONE ENCOUNTER
Patient called states she is having rectal bleeding, dizziness, lightheadedness, and some abdominal pain  I advised her to go to the ED in [place]  At this point her mother got on the phone she states patient is fine it is just spotting and she could not leave the house regardless  I did explain to her what the patient states are her symptoms and she should go to the ED  She states if she gets worse she will take her to the ED

## 2020-07-24 NOTE — TELEPHONE ENCOUNTER
Called and spoke to patients mother  She states that patient is better today  She (mom) was concerned as to why some rectal bleeding 2 days after procedure? I explained that maybe it was the first solid bowel movement patient has had since procedure - irritated polyp removal spot  She stated that made sense  She's aware to contact physician on call - if needed - this weekend

## 2020-10-15 ENCOUNTER — IMMUNIZATIONS (OUTPATIENT)
Dept: FAMILY MEDICINE CLINIC | Facility: CLINIC | Age: 55
End: 2020-10-15
Payer: COMMERCIAL

## 2020-10-15 VITALS — TEMPERATURE: 97.8 F

## 2020-10-15 DIAGNOSIS — Z23 NEED FOR INFLUENZA VACCINATION: Primary | ICD-10-CM

## 2020-10-15 PROCEDURE — 90682 RIV4 VACC RECOMBINANT DNA IM: CPT

## 2020-10-15 PROCEDURE — G0008 ADMIN INFLUENZA VIRUS VAC: HCPCS

## 2020-12-14 ENCOUNTER — OFFICE VISIT (OUTPATIENT)
Dept: FAMILY MEDICINE CLINIC | Facility: CLINIC | Age: 55
End: 2020-12-14
Payer: COMMERCIAL

## 2020-12-14 VITALS
WEIGHT: 141 LBS | SYSTOLIC BLOOD PRESSURE: 120 MMHG | DIASTOLIC BLOOD PRESSURE: 60 MMHG | HEART RATE: 77 BPM | TEMPERATURE: 97.3 F | BODY MASS INDEX: 27.68 KG/M2 | OXYGEN SATURATION: 97 % | HEIGHT: 60 IN

## 2020-12-14 DIAGNOSIS — I73.00 RAYNAUD'S PHENOMENON WITHOUT GANGRENE: ICD-10-CM

## 2020-12-14 DIAGNOSIS — G43.809 OTHER MIGRAINE WITHOUT STATUS MIGRAINOSUS, NOT INTRACTABLE: ICD-10-CM

## 2020-12-14 DIAGNOSIS — N80.9 ENDOMETRIOSIS: ICD-10-CM

## 2020-12-14 DIAGNOSIS — N20.0 RIGHT NEPHROLITHIASIS: ICD-10-CM

## 2020-12-14 DIAGNOSIS — E78.9 BORDERLINE HIGH CHOLESTEROL: ICD-10-CM

## 2020-12-14 DIAGNOSIS — Z00.00 MEDICARE ANNUAL WELLNESS VISIT, SUBSEQUENT: Primary | ICD-10-CM

## 2020-12-14 PROCEDURE — T1015 CLINIC SERVICE: HCPCS | Performed by: FAMILY MEDICINE

## 2020-12-14 PROCEDURE — 1036F TOBACCO NON-USER: CPT | Performed by: FAMILY MEDICINE

## 2020-12-14 PROCEDURE — 3008F BODY MASS INDEX DOCD: CPT | Performed by: FAMILY MEDICINE

## 2020-12-14 PROCEDURE — G0439 PPPS, SUBSEQ VISIT: HCPCS | Performed by: FAMILY MEDICINE

## 2020-12-14 PROCEDURE — 3725F SCREEN DEPRESSION PERFORMED: CPT | Performed by: FAMILY MEDICINE

## 2021-01-04 ENCOUNTER — HOSPITAL ENCOUNTER (OUTPATIENT)
Dept: MAMMOGRAPHY | Facility: MEDICAL CENTER | Age: 56
Discharge: HOME | End: 2021-01-04
Payer: COMMERCIAL

## 2021-01-04 VITALS — BODY MASS INDEX: 27.68 KG/M2 | HEIGHT: 60 IN | WEIGHT: 141 LBS

## 2021-01-04 DIAGNOSIS — Z12.31 ENCOUNTER FOR SCREENING MAMMOGRAM FOR MALIGNANT NEOPLASM OF BREAST: ICD-10-CM

## 2021-01-04 PROCEDURE — 77063 BREAST TOMOSYNTHESIS BI: CPT

## 2021-01-04 PROCEDURE — 77067 SCR MAMMO BI INCL CAD: CPT

## 2021-01-12 LAB
ALBUMIN SERPL-MCNC: 4.4 G/DL (ref 3.6–5.1)
ALBUMIN/GLOB SERPL: 1.6 (CALC) (ref 1–2.5)
ALP SERPL-CCNC: 80 U/L (ref 37–153)
ALT SERPL-CCNC: 42 U/L (ref 6–29)
AST SERPL-CCNC: 32 U/L (ref 10–35)
BILIRUB SERPL-MCNC: 0.5 MG/DL (ref 0.2–1.2)
BUN SERPL-MCNC: 11 MG/DL (ref 7–25)
BUN/CREAT SERPL: ABNORMAL (CALC) (ref 6–22)
CALCIUM SERPL-MCNC: 9.5 MG/DL (ref 8.6–10.4)
CHLORIDE SERPL-SCNC: 102 MMOL/L (ref 98–110)
CHOLEST SERPL-MCNC: 229 MG/DL
CHOLEST/HDLC SERPL: 3 (CALC)
CO2 SERPL-SCNC: 30 MMOL/L (ref 20–32)
CREAT SERPL-MCNC: 0.81 MG/DL (ref 0.5–1.05)
ERYTHROCYTE [DISTWIDTH] IN BLOOD BY AUTOMATED COUNT: 12.5 % (ref 11–15)
GLOBULIN SER CALC-MCNC: 2.8 G/DL (CALC) (ref 1.9–3.7)
GLUCOSE SERPL-MCNC: 86 MG/DL (ref 65–99)
HCT VFR BLD AUTO: 41.4 % (ref 35–45)
HDLC SERPL-MCNC: 76 MG/DL
HGB BLD-MCNC: 13.5 G/DL (ref 11.7–15.5)
LDLC SERPL CALC-MCNC: 131 MG/DL (CALC)
MCH RBC QN AUTO: 30.9 PG (ref 27–33)
MCHC RBC AUTO-ENTMCNC: 32.6 G/DL (ref 32–36)
MCV RBC AUTO: 94.7 FL (ref 80–100)
NONHDLC SERPL-MCNC: 153 MG/DL (CALC)
PLATELET # BLD AUTO: 200 THOUSAND/UL (ref 140–400)
PMV BLD REES-ECKER: 11.3 FL (ref 7.5–12.5)
POTASSIUM SERPL-SCNC: 4.3 MMOL/L (ref 3.5–5.3)
PROT SERPL-MCNC: 7.2 G/DL (ref 6.1–8.1)
RBC # BLD AUTO: 4.37 MILLION/UL (ref 3.8–5.1)
SL AMB EGFR AFRICAN AMERICAN: 95 ML/MIN/1.73M2
SL AMB EGFR NON AFRICAN AMERICAN: 82 ML/MIN/1.73M2
SODIUM SERPL-SCNC: 138 MMOL/L (ref 135–146)
TRIGL SERPL-MCNC: 109 MG/DL
WBC # BLD AUTO: 5.4 THOUSAND/UL (ref 3.8–10.8)

## 2021-01-13 PROBLEM — E78.00 HYPERCHOLESTEROLEMIA: Status: ACTIVE | Noted: 2021-01-13

## 2021-01-19 ENCOUNTER — TELEPHONE (OUTPATIENT)
Dept: FAMILY MEDICINE CLINIC | Facility: CLINIC | Age: 56
End: 2021-01-19

## 2021-01-19 NOTE — LETTER
Date: 1/19/2021    To whom it may concern: This is to certify that Caprice Israel has been under my care for the following diagnosis: Testing information inserted here  I feel that she is unable to serve on Jury Duty at this time for the above mentioned medical reasons                    Sincerely,   Sheyla Ask, DO

## 2021-01-19 NOTE — TELEPHONE ENCOUNTER
Please get letter to her excusing her from jury duty    Intellectual disability along with history seizure-like activity

## 2021-04-09 ENCOUNTER — TELEPHONE (OUTPATIENT)
Dept: FAMILY MEDICINE CLINIC | Facility: CLINIC | Age: 56
End: 2021-04-09

## 2021-04-09 NOTE — TELEPHONE ENCOUNTER
Pt called she wants to get the COVID shot but she is concerned because she does have a penicillin allergy and wants to know can she still get the shot mom told her to call and ask the office if she would like help setting up for the covid shot

## 2021-10-25 ENCOUNTER — CLINICAL SUPPORT (OUTPATIENT)
Dept: FAMILY MEDICINE CLINIC | Facility: CLINIC | Age: 56
End: 2021-10-25
Payer: COMMERCIAL

## 2021-10-25 VITALS — TEMPERATURE: 97.6 F

## 2021-10-25 DIAGNOSIS — Z23 NEED FOR INFLUENZA VACCINATION: Primary | ICD-10-CM

## 2021-10-25 PROCEDURE — 90682 RIV4 VACC RECOMBINANT DNA IM: CPT

## 2021-10-25 PROCEDURE — G0008 ADMIN INFLUENZA VIRUS VAC: HCPCS

## 2022-02-10 ENCOUNTER — HOSPITAL ENCOUNTER (OUTPATIENT)
Dept: MAMMOGRAPHY | Facility: MEDICAL CENTER | Age: 57
Discharge: HOME | End: 2022-02-10
Payer: COMMERCIAL

## 2022-02-10 VITALS — WEIGHT: 141.09 LBS | BODY MASS INDEX: 27.7 KG/M2 | HEIGHT: 60 IN

## 2022-02-10 DIAGNOSIS — Z12.31 SCREENING MAMMOGRAM FOR HIGH-RISK PATIENT: ICD-10-CM

## 2022-02-10 PROCEDURE — 77063 BREAST TOMOSYNTHESIS BI: CPT

## 2022-02-10 PROCEDURE — 77067 SCR MAMMO BI INCL CAD: CPT

## 2022-10-27 ENCOUNTER — CLINICAL SUPPORT (OUTPATIENT)
Dept: FAMILY MEDICINE CLINIC | Facility: CLINIC | Age: 57
End: 2022-10-27
Payer: COMMERCIAL

## 2022-10-27 DIAGNOSIS — Z23 NEED FOR INFLUENZA VACCINATION: Primary | ICD-10-CM

## 2022-10-27 PROCEDURE — 90682 RIV4 VACC RECOMBINANT DNA IM: CPT

## 2022-10-27 PROCEDURE — G0008 ADMIN INFLUENZA VIRUS VAC: HCPCS

## 2022-12-16 ENCOUNTER — RA CDI HCC (OUTPATIENT)
Dept: OTHER | Facility: HOSPITAL | Age: 57
End: 2022-12-16

## 2022-12-16 NOTE — PROGRESS NOTES
Kristina [facility]  coding opportunities       Chart reviewed, no opportunity found: CHART REVIEWED, NO OPPORTUNITY FOUND        Patients Insurance     Medicare Insurance: [employer] [facility] Advantage

## 2022-12-22 ENCOUNTER — OFFICE VISIT (OUTPATIENT)
Dept: FAMILY MEDICINE CLINIC | Facility: CLINIC | Age: 57
End: 2022-12-22

## 2022-12-22 VITALS
OXYGEN SATURATION: 97 % | HEIGHT: 60 IN | DIASTOLIC BLOOD PRESSURE: 80 MMHG | TEMPERATURE: 97.9 F | HEART RATE: 74 BPM | SYSTOLIC BLOOD PRESSURE: 118 MMHG | WEIGHT: 145 LBS | BODY MASS INDEX: 28.47 KG/M2

## 2022-12-22 DIAGNOSIS — Z00.00 MEDICARE ANNUAL WELLNESS VISIT, SUBSEQUENT: Primary | ICD-10-CM

## 2022-12-22 DIAGNOSIS — E78.00 HYPERCHOLESTEROLEMIA: ICD-10-CM

## 2022-12-22 DIAGNOSIS — I73.00 RAYNAUD'S PHENOMENON WITHOUT GANGRENE: ICD-10-CM

## 2022-12-22 DIAGNOSIS — R56.9 SEIZURE-LIKE ACTIVITY (HCC): ICD-10-CM

## 2022-12-22 DIAGNOSIS — G43.809 OTHER MIGRAINE WITHOUT STATUS MIGRAINOSUS, NOT INTRACTABLE: ICD-10-CM

## 2022-12-22 NOTE — PROGRESS NOTES
Assessment and Plan:     Problem List Items Addressed This Visit        Cardiovascular and Mediastinum    Migraine headache       Other    Hx of Seizure-like activity     Raynaud's phenomenon    Hypercholesterolemia   Other Visit Diagnoses     Medicare annual wellness visit, subsequent    -  Primary           Preventive health issues were discussed with patient, and age appropriate screening tests were ordered as noted in patient's After Visit Summary  Personalized health advice and appropriate referrals for health education or preventive services given if needed, as noted in patient's After Visit Summary      See other note today regarding provider information       History of Present Illness:     Patient presents for a Medicare Wellness Visit    Patient Care Team:  Selestino Cogan, DO as PCP - General (Family Medicine)  Selestino Cogan, DO as PCP - PCP-[facility]  Shannon Stevens MD (Obstetrics and Gynecology)     Problem List:     Patient Active Problem List   Diagnosis   • Hx of Seizure-like activity    • LUE numbness - improved   • Right nephrolithiasis   • Raynaud's phenomenon   • Migraine headache   • Maxillary sinus cyst   • Learning difficulty   • Endometriosis   • Allergic rhinitis   • Hypercholesterolemia      Past Medical and Surgical History:     Past Medical History:   Diagnosis Date   • Acute cystitis with hematuria     Last assessed: 3/1/16   • Carpal tunnel syndrome of left wrist 12/2017   • Confusion and disorientation 12/27/2017   • Endometriosis    • Kidney stone    • Learning disabilities    • Migraine headache    • Raynaud's phenomenon    • Reactive airway disease    • Syncope 2014    Last assessed: 10/10/13     Past Surgical History:   Procedure Laterality Date   • COLONOSCOPY W/ BIOPSIES  07/21/2020    5 YEAR RECOMMENDED = TUBULAR ADENOMA   • EXPLORATORY LAPAROTOMY     • HYSTERECTOMY  08/2014    Total Abdominal   • OOPHORECTOMY        Family History:     Family History Adopted: Yes      Social History:     Social History     Socioeconomic History   • Marital status: Single     Spouse name: None   • Number of children: None   • Years of education: None   • Highest education level: None   Occupational History   • None   Tobacco Use   • Smoking status: Never   • Smokeless tobacco: Never   Vaping Use   • Vaping Use: Never used   Substance and Sexual Activity   • Alcohol use: No   • Drug use: No   • Sexual activity: Not Currently   Other Topics Concern   • None   Social History Narrative   • None     Social Determinants of Health     Financial Resource Strain: Not on file   Food Insecurity: Not on file   Transportation Needs: Not on file   Physical Activity: Not on file   Stress: Not on file   Social Connections: Not on file   Intimate Partner Violence: Not on file   Housing Stability: Not on file      Medications and Allergies:     Current Outpatient Medications   Medication Sig Dispense Refill   • aspirin 81 MG tablet Take 81 mg by mouth daily  • Calcium 500 MG tablet Take 1 tablet by mouth daily       • estradiol (ESTRACE) 1 mg tablet Take 1 mg by mouth daily     • Flaxseed, Linseed, (FLAX SEED OIL) 1300 MG CAPS Take 1 capsule by mouth daily (Patient not taking: Reported on 12/22/2022)     • magnesium oxide (MAG-OX) 400 mg Take 1 tablet by mouth daily (Patient not taking: Reported on 12/22/2022) 30 tablet 0     No current facility-administered medications for this visit       Allergies   Allergen Reactions   • Bee Venom    • Morphine Hallucinations   • Sulfa Antibiotics GI Intolerance   • Penicillins Rash   • Tetracycline Rash      Immunizations:     Immunization History   Administered Date(s) Administered   • Influenza Quadrivalent Preservative Free 3 years and older IM 10/16/2014, 10/31/2017   • Influenza Quadrivalent, 6-35 Months IM 10/20/2015, 10/27/2016   • Influenza, recombinant, quadrivalent,injectable, preservative free 12/03/2018, 09/25/2019, 10/15/2020, 10/25/2021, 10/27/2022   • Influenza, seasonal, injectable 10/01/2012, 10/10/2013      Health Maintenance:         Topic Date Due   • Breast Cancer Screening: Mammogram  02/10/2023   • HIV Screening  12/12/2029 (Originally 5/4/1980)   • Hepatitis C Screening  01/09/2030 (Originally 1965)   • Colorectal Cancer Screening  07/21/2023   • Cervical Cancer Screening  06/24/2024         Topic Date Due   • COVID-19 Vaccine (1) Never done      Medicare Screening Tests and Risk Assessments:     Francisca Delacruz is here for her Subsequent Wellness visit  Health Risk Assessment:   Patient rates overall health as good  Patient feels that their physical health rating is much better  Eyesight was rated as same  Hearing was rated as same  Patient feels that their emotional and mental health rating is same  Patients states they are never, rarely angry  Patient states they are sometimes unusually tired/fatigued  Pain experienced in the last 7 days has been none  Patient states that she has experienced no weight loss or gain in last 6 months  Fall Risk Screening: In the past year, patient has experienced: no history of falling in past year      Urinary Incontinence Screening:   Patient has not leaked urine accidently in the last six months  Home Safety:  Patient does not have trouble with stairs inside or outside of their home  Patient has working smoke alarms and has working carbon monoxide detector  Home safety hazards include: none  Nutrition:   Current diet is Regular  Medications:   Patient is currently taking over-the-counter supplements  OTC medications include: see medication list  Patient is able to manage medications  Activities of Daily Living (ADLs)/Instrumental Activities of Daily Living (IADLs):   Walk and transfer into and out of bed and chair?: Yes  Dress and groom yourself?: Yes    Bathe or shower yourself?: Yes    Feed yourself?  Yes  Do your laundry/housekeeping?: Yes  Manage your money, pay your bills and track your expenses?: Yes  Make your own meals?: Yes    Do your own shopping?: Yes    Previous Hospitalizations:   Any hospitalizations or ED visits within the last 12 months?: No      Advance Care Planning:   Living will: No    Durable POA for healthcare: No    Advanced directive: No    Five wishes given: Yes      PREVENTIVE SCREENINGS      Cardiovascular Screening:    General: Screening Not Indicated and History Lipid Disorder      Colorectal Cancer Screening:     General: Screening Current      Breast Cancer Screening:     General: Screening Current      Lung Cancer Screening:     General: Screening Not Indicated      Hepatitis C Screening:    General: Screening Current    Screening, Brief Intervention, and Referral to Treatment (SBIRT)    Screening    Typical number of drinks in a week: 0    Single Item Drug Screening:  How often have you used an illegal drug (including marijuana) or a prescription medication for non-medical reasons in the past year? never    Single Item Drug Screen Score: 0  Interpretation: Negative screen for possible drug use disorder    No results found       Physical Exam:     /80 (BP Location: Left arm, Patient Position: Sitting, Cuff Size: Large)   Pulse 74   Temp 97 9 °F (36 6 °C)   Ht 5' (1 524 m)   Wt 65 8 kg (145 lb)   SpO2 97%   BMI 28 32 kg/m²     Physical Exam     Angelique Nails, DO

## 2022-12-22 NOTE — PATIENT INSTRUCTIONS
Reviewed health history along with medications  She is doing well here today    We did review previous blood work from 1/2021-  she will redo fasting BW  She is up to date with Lipid screening  She is up to date with Diabetes screening  Immunization History   Administered Date(s) Administered    Influenza Quadrivalent Preservative Free 3 years and older IM 10/16/2014, 10/31/2017    Influenza Quadrivalent, 6-35 Months IM 10/20/2015, 10/27/2016    Influenza, recombinant, quadrivalent,injectable, preservative free 12/03/2018, 09/25/2019, 10/15/2020, 10/25/2021, 10/27/2022    Influenza, seasonal, injectable 10/01/2012, 10/10/2013     Discussed Vaccines,   She does do yearly Flu shot  Tdap/tetanus shot will be done at a future date  (done every 10 yrs for superficial cuts, every 5 yrs for deep wounds)   Can also look into coverage for 'shingles' shot, Shingrix  Can do that at pharmacy  Covid vaccine rcvd x 2      Was never a smoker     Regarding Colon Cancer screening,   Screening is up to date -   Had tubular adenoma/ polyp on Colonoscopy 7/2020- redo 2025  She does see her Gynecologist routinely  Has been on Estradiol  Mammogram screening was discussed, is  up-to-date  She will redo that in Feb     Hx tiny kidney stone on CT few yrs ago -  keep hydrated, just observe  She does not have a  "LIVING WILL"   "FIVE WISHES" handout was discussed and given to fill out at home prev    Regarding Hepatitis C Screening, she will not do Hepatitis C blood test    low risk    Glaucoma screening is due -  she has no complaints    Continue with routine exercise as tolerated, has been using weights, doing balance exercises, using exercise bike  Continue to try to watch healthy diet  We will see her back in 12 months, sooner as needed

## 2022-12-22 NOTE — PROGRESS NOTES
BMI Counseling: Body mass index is 28 32 kg/m²  The BMI is above normal  Nutrition recommendations include encouraging healthy choices of fruits and vegetables and moderation in carbohydrate intake  Exercise recommendations include exercising 3-5 times per week  Rationale for BMI follow-up plan is due to patient being overweight or obese  Depression Screening and Follow-up Plan: Patient was screened for depression during today's encounter  They screened negative with a PHQ-2 score of 0  [facility]  [address]  [address], [place], [address] MEDICARE SUBSEQUENT VISIT NOTE ( part 1 )      NAME: Miguel A Domínguez  AGE: 65 y o  SEX: female  : 1965   MRN: [id number]    DATE: 2022  TIME: 11:33 AM    Assessment and Plan     Problem List Items Addressed This Visit     Hx of Seizure-like activity     Relevant Orders    TSH, 3rd generation with Free T4 reflex    Raynaud's phenomenon    Relevant Orders    CBC    Comprehensive metabolic panel    Migraine headache    Relevant Orders    CBC    Comprehensive metabolic panel    Hypercholesterolemia    Relevant Orders    Comprehensive metabolic panel    Lipid panel    TSH, 3rd generation with Free T4 reflex   Other Visit Diagnoses     Medicare annual wellness visit, subsequent    -  Primary    BMI 28              Medicare Wellness Counseling/ Discussion    Patient Instructions     Reviewed health history along with medications  She is doing well here today    We did review previous blood work from 2021-  she will redo fasting BW  She is up to date with Lipid screening  She is up to date with Diabetes screening      Immunization History   Administered Date(s) Administered   • Influenza Quadrivalent Preservative Free 3 years and older IM 10/16/2014, 10/31/2017   • Influenza Quadrivalent, 6-35 Months IM 10/20/2015, 10/27/2016   • Influenza, recombinant, quadrivalent,injectable, preservative free 12/03/2018, 09/25/2019, 10/15/2020, 10/25/2021, 10/27/2022   • Influenza, seasonal, injectable 10/01/2012, 10/10/2013     Discussed Vaccines,   She does do yearly Flu shot  Tdap/tetanus shot will be done at a future date  (done every 10 yrs for superficial cuts, every 5 yrs for deep wounds)   Can also look into coverage for 'shingles' shot, Shingrix  Can do that at pharmacy  Covid vaccine rcvd x 2      Was never a smoker     Regarding Colon Cancer screening,   Screening is up to date -   Had tubular adenoma/ polyp on Colonoscopy 7/2020- redo 2025  She does see her Gynecologist routinely  Has been on Estradiol  Mammogram screening was discussed, is  up-to-date  She will redo that in Feb     Hx tiny kidney stone on CT few yrs ago -  keep hydrated, just observe  She does not have a  "LIVING WILL"   "FIVE WISHES" handout was discussed and given to fill out at home prev    Regarding Hepatitis C Screening, she will not do Hepatitis C blood test    low risk    Glaucoma screening is due -  she has no complaints    Continue with routine exercise as tolerated, has been using weights, doing balance exercises, using exercise bike  Continue to try to watch healthy diet  We will see her back in 12 months, sooner as needed  Chief Complaint     Chief Complaint   Patient presents with   • Medicare Wellness Visit       History of Present Illness     Radha Hay is a 65y o -year-old female who is in today for a yearly checkup, since I saw her in 2020 she has been feeling well, had noted some bruising, that improved  Is exercising, does see GYN    Review of Systems     Review of Systems   Constitutional: Negative for activity change (exercises-   was walking), appetite change, fatigue, fever and unexpected weight change  HENT: Negative for sore throat and trouble swallowing  Respiratory: Negative for cough, chest tightness and shortness of breath  Cardiovascular: Negative for chest pain, palpitations and leg swelling  Gastrointestinal: Negative for abdominal pain, blood in stool, nausea and vomiting  No acid reflux     No change in bowel   Genitourinary: Negative for difficulty urinating, dysuria and hematuria  Musculoskeletal: Negative for arthralgias and back pain  Neurological: Negative for dizziness, syncope, light-headedness and headaches (No issues with headaches)  Has had no neurological changes, remote history seizure-like activity   Hematological: Bruises/bleeds easily  Psychiatric/Behavioral: Negative for sleep disturbance         Active Problem List     Patient Active Problem List   Diagnosis   • Hx of Seizure-like activity    • LUE numbness - improved   • Right nephrolithiasis   • Raynaud's phenomenon   • Migraine headache   • Maxillary sinus cyst   • Learning difficulty   • Endometriosis   • Allergic rhinitis   • Hypercholesterolemia       Past Medical History:  Past Medical History:   Diagnosis Date   • Acute cystitis with hematuria     Last assessed: 3/1/16   • Carpal tunnel syndrome of left wrist 12/2017   • Confusion and disorientation 12/27/2017   • Endometriosis    • Kidney stone    • Learning disabilities    • Migraine headache    • Raynaud's phenomenon    • Reactive airway disease    • Syncope 2014    Last assessed: 10/10/13       Past Surgical History:  Past Surgical History:   Procedure Laterality Date   • COLONOSCOPY W/ BIOPSIES  07/21/2020    5 YEAR RECOMMENDED = TUBULAR ADENOMA   • EXPLORATORY LAPAROTOMY     • HYSTERECTOMY  08/2014    Total Abdominal   • OOPHORECTOMY         Family History:  Family History   Adopted: Yes       Social History:  Social History     Tobacco Use   • Smoking status: Never   • Smokeless tobacco: Never   Substance Use Topics   • Alcohol use: No       Objective     Vitals:    12/22/22 1035   BP: 118/80   BP Location: Left arm   Patient Position: Sitting   Cuff Size: Large   Pulse: 74 Temp: 97 9 °F (36 6 °C)   SpO2: 97%   Weight: 65 8 kg (145 lb)   Height: 5' (1 524 m)     Body mass index is 28 32 kg/m²  BP Readings from Last 3 Encounters:   12/22/22 118/80   12/14/20 120/60   07/21/20 136/88       Wt Readings from Last 3 Encounters:   12/22/22 65 8 kg (145 lb)   02/10/22 64 kg (141 lb 1 5 oz)   01/04/21 64 kg (141 lb)       Physical Exam    ALLERGIES:  Allergies   Allergen Reactions   • Bee Venom    • Morphine Hallucinations   • Sulfa Antibiotics GI Intolerance   • Penicillins Rash   • Tetracycline Rash       Current Medications     Current Outpatient Medications   Medication Sig Dispense Refill   • aspirin 81 MG tablet Take 81 mg by mouth daily  • Calcium 500 MG tablet Take 1 tablet by mouth daily       • estradiol (ESTRACE) 1 mg tablet Take 1 mg by mouth daily     • Flaxseed, Linseed, (FLAX SEED OIL) 1300 MG CAPS Take 1 capsule by mouth daily (Patient not taking: Reported on 12/22/2022)     • magnesium oxide (MAG-OX) 400 mg Take 1 tablet by mouth daily (Patient not taking: Reported on 12/22/2022) 30 tablet 0     No current facility-administered medications for this visit           Health Maintenance     See other note today re clinical AWV info             Most recent labs available from [address]   ( others may be available in Care Everywhere / Media sections)  Lab Results   Component Value Date    WBC 5 4 01/12/2021    HGB 13 5 01/12/2021    HCT 41 4 01/12/2021     01/12/2021    CHOL 185 11/16/2016    TRIG 109 01/12/2021    HDL 76 01/12/2021    LDLDIRECT 108 11/16/2016    ALT 42 (H) 01/12/2021    AST 32 01/12/2021     11/09/2017    K 4 3 01/12/2021     01/12/2021    CREATININE 0 81 01/12/2021    BUN 11 01/12/2021    CO2 30 01/12/2021       Orders Placed This Encounter   Procedures   • CBC   • Comprehensive metabolic panel   • Lipid panel   • TSH, 3rd generation with Free T4 reflex             Patty Lara DO

## 2023-02-14 ENCOUNTER — HOSPITAL ENCOUNTER (OUTPATIENT)
Dept: MAMMOGRAPHY | Facility: MEDICAL CENTER | Age: 58
Discharge: HOME | End: 2023-02-14

## 2023-02-14 VITALS — BODY MASS INDEX: 28.48 KG/M2 | HEIGHT: 60 IN | WEIGHT: 145.06 LBS

## 2023-02-14 DIAGNOSIS — Z12.31 ENCOUNTER FOR SCREENING MAMMOGRAM FOR MALIGNANT NEOPLASM OF BREAST: ICD-10-CM

## 2023-02-22 LAB
ALBUMIN SERPL-MCNC: 4.2 G/DL (ref 3.6–5.1)
ALBUMIN/GLOB SERPL: 1.6 (CALC) (ref 1–2.5)
ALP SERPL-CCNC: 70 U/L (ref 37–153)
ALT SERPL-CCNC: 18 U/L (ref 6–29)
AST SERPL-CCNC: 17 U/L (ref 10–35)
BILIRUB SERPL-MCNC: 0.4 MG/DL (ref 0.2–1.2)
BUN SERPL-MCNC: 11 MG/DL (ref 7–25)
BUN/CREAT SERPL: NORMAL (CALC) (ref 6–22)
CALCIUM SERPL-MCNC: 9 MG/DL (ref 8.6–10.4)
CHLORIDE SERPL-SCNC: 103 MMOL/L (ref 98–110)
CHOLEST SERPL-MCNC: 200 MG/DL
CHOLEST/HDLC SERPL: 2.9 (CALC)
CO2 SERPL-SCNC: 29 MMOL/L (ref 20–32)
CREAT SERPL-MCNC: 0.78 MG/DL (ref 0.5–1.03)
ERYTHROCYTE [DISTWIDTH] IN BLOOD BY AUTOMATED COUNT: 12.7 % (ref 11–15)
GFR/BSA.PRED SERPLBLD CYS-BASED-ARV: 89 ML/MIN/1.73M2
GLOBULIN SER CALC-MCNC: 2.7 G/DL (CALC) (ref 1.9–3.7)
GLUCOSE SERPL-MCNC: 87 MG/DL (ref 65–99)
HCT VFR BLD AUTO: 38.1 % (ref 35–45)
HDLC SERPL-MCNC: 69 MG/DL
HGB BLD-MCNC: 12.6 G/DL (ref 11.7–15.5)
LDLC SERPL CALC-MCNC: 114 MG/DL (CALC)
MCH RBC QN AUTO: 30.7 PG (ref 27–33)
MCHC RBC AUTO-ENTMCNC: 33.1 G/DL (ref 32–36)
MCV RBC AUTO: 92.7 FL (ref 80–100)
NONHDLC SERPL-MCNC: 131 MG/DL (CALC)
PLATELET # BLD AUTO: 194 THOUSAND/UL (ref 140–400)
PMV BLD REES-ECKER: 11.8 FL (ref 7.5–12.5)
POTASSIUM SERPL-SCNC: 4.1 MMOL/L (ref 3.5–5.3)
PROT SERPL-MCNC: 6.9 G/DL (ref 6.1–8.1)
RBC # BLD AUTO: 4.11 MILLION/UL (ref 3.8–5.1)
SODIUM SERPL-SCNC: 139 MMOL/L (ref 135–146)
TRIGL SERPL-MCNC: 73 MG/DL
TSH SERPL-ACNC: 2.53 MIU/L (ref 0.4–4.5)
WBC # BLD AUTO: 4.2 THOUSAND/UL (ref 3.8–10.8)

## 2023-10-02 ENCOUNTER — CLINICAL SUPPORT (OUTPATIENT)
Dept: FAMILY MEDICINE CLINIC | Facility: CLINIC | Age: 58
End: 2023-10-02
Payer: COMMERCIAL

## 2023-10-02 DIAGNOSIS — Z23 NEED FOR INFLUENZA VACCINATION: Primary | ICD-10-CM

## 2023-10-02 PROCEDURE — 90686 IIV4 VACC NO PRSV 0.5 ML IM: CPT

## 2023-10-02 PROCEDURE — G0008 ADMIN INFLUENZA VIRUS VAC: HCPCS

## 2023-10-06 NOTE — PROGRESS NOTES
Subjective      Sarah Hardin is a 63 y.o. female who presents for annual well woman exam. She is s/p supracervical abdominal hysterectomy and bilateral oophorectomy in . GYN:  · Denies vaginal discharge, labial erythema or lesions, dyspareunia. · Patient is not sexually active, was many years ago; she is in a new relationship with an older man, they do not plan to become sexually active; she declines an STI screening today  · History of endometriosis  · Still taking Estrace    OB:  ·  female    :  · Denies dysuria, urinary frequency or urgency. · Denies hematuria, flank pain, incontinence. Breast:  · Denies breast mass, skin changes, dimpling, reddening, nipple retraction. · Denies breast discharge. · Patient is adopted, unsure of family history    General:  · Diet: Reviewed dietary calcium recommendations; drinks almond milk every day + multi vitamin  · Exercise: Reviewed recommendation of 150 min moderate intensity exercise per week; uses exercise bike daily  · ETOH use: no  · Tobacco use: no  · Recreational drug use: no    Screening:  · Breast cancer: last mammogram in 2023. Results were normal.  · Colon cancer: last colonoscopy in . Results showed a tubular adenoma, recommended repeat colonoscopy in 5 years. Review of Systems  Pertinent items are noted in HPI. Objective      /82 (BP Location: Right arm, Patient Position: Sitting, Cuff Size: Standard)   Ht 5' (1.524 m)   Wt 65.3 kg (144 lb)   BMI 28.12 kg/m²     Physical Exam  Constitutional:       Appearance: Normal appearance. HENT:      Head: Normocephalic and atraumatic. Cardiovascular:      Rate and Rhythm: Normal rate. Pulmonary:      Effort: Pulmonary effort is normal. No respiratory distress. Chest:   Breasts:     Right: Normal. No inverted nipple, mass or tenderness. Left: Normal. No inverted nipple, mass or tenderness. Abdominal:      Palpations: Abdomen is soft. Tenderness:  There is no abdominal tenderness. Genitourinary:     General: Normal vulva. Vagina: No vaginal discharge. Comments: Cervix appears normal; uterus and adnexa surgically absent  Musculoskeletal:         General: Normal range of motion. Skin:     General: Skin is warm and dry. Neurological:      Mental Status: She is alert.                  Assessment     62 yo G0 presents today for her annual exam    Plan   - Mammogram for next year ordered  - Pap smear collected

## 2023-10-09 ENCOUNTER — ANNUAL EXAM (OUTPATIENT)
Dept: OBGYN CLINIC | Facility: CLINIC | Age: 58
End: 2023-10-09
Payer: COMMERCIAL

## 2023-10-09 VITALS
SYSTOLIC BLOOD PRESSURE: 124 MMHG | WEIGHT: 144 LBS | HEIGHT: 60 IN | BODY MASS INDEX: 28.27 KG/M2 | DIASTOLIC BLOOD PRESSURE: 82 MMHG

## 2023-10-09 DIAGNOSIS — Z12.4 CERVICAL CANCER SCREENING: ICD-10-CM

## 2023-10-09 DIAGNOSIS — Z01.419 WOMEN'S ANNUAL ROUTINE GYNECOLOGICAL EXAMINATION: Primary | ICD-10-CM

## 2023-10-09 PROCEDURE — G0476 HPV COMBO ASSAY CA SCREEN: HCPCS | Performed by: OBSTETRICS & GYNECOLOGY

## 2023-10-09 PROCEDURE — G0145 SCR C/V CYTO,THINLAYER,RESCR: HCPCS | Performed by: OBSTETRICS & GYNECOLOGY

## 2023-10-09 PROCEDURE — G0101 CA SCREEN;PELVIC/BREAST EXAM: HCPCS | Performed by: OBSTETRICS & GYNECOLOGY

## 2023-10-10 ENCOUNTER — TELEPHONE (OUTPATIENT)
Dept: OBGYN CLINIC | Facility: CLINIC | Age: 58
End: 2023-10-10

## 2023-10-10 DIAGNOSIS — R23.2 HOT FLASHES: Primary | ICD-10-CM

## 2023-10-10 LAB
HPV HR 12 DNA CVX QL NAA+PROBE: NEGATIVE
HPV16 DNA CVX QL NAA+PROBE: NEGATIVE
HPV18 DNA CVX QL NAA+PROBE: NEGATIVE

## 2023-10-10 RX ORDER — ESTRADIOL 1 MG/1
1 TABLET ORAL DAILY
Qty: 30 TABLET | Refills: 12 | Status: SHIPPED | OUTPATIENT
Start: 2023-10-10

## 2023-10-10 NOTE — TELEPHONE ENCOUNTER
Pt called. Pt states she seen for office visit yesterday. Pt states she forgot to mention she needed a refill for her estradiol (ESTRACE) which is done 11/28. Please advise.

## 2023-10-14 LAB
LAB AP GYN PRIMARY INTERPRETATION: NORMAL
Lab: NORMAL

## 2024-02-29 ENCOUNTER — RA CDI HCC (OUTPATIENT)
Dept: OTHER | Facility: HOSPITAL | Age: 59
End: 2024-02-29

## 2024-02-29 NOTE — PROGRESS NOTES
HCC coding opportunities       Chart reviewed, no opportunity found: CHART REVIEWED, NO OPPORTUNITY FOUND   This is a reminder to address (resolve/update/assess) ALL HCC (risk adjustment) codes as found on active problem list for 2024 as patient scores reset to zero CHONG.  Also, just a reminder to please review and assess all other chronic conditions for 2024     Patients Insurance     Medicare Insurance: [employer]

## 2024-03-07 ENCOUNTER — OFFICE VISIT (OUTPATIENT)
Dept: FAMILY MEDICINE CLINIC | Facility: CLINIC | Age: 59
End: 2024-03-07
Payer: COMMERCIAL

## 2024-03-07 VITALS
WEIGHT: 138.2 LBS | HEIGHT: 60 IN | BODY MASS INDEX: 27.13 KG/M2 | HEART RATE: 69 BPM | DIASTOLIC BLOOD PRESSURE: 80 MMHG | OXYGEN SATURATION: 97 % | SYSTOLIC BLOOD PRESSURE: 126 MMHG

## 2024-03-07 DIAGNOSIS — Z78.0 POSTMENOPAUSAL: Primary | ICD-10-CM

## 2024-03-07 DIAGNOSIS — J34.1 MAXILLARY SINUS CYST: ICD-10-CM

## 2024-03-07 DIAGNOSIS — G43.809 OTHER MIGRAINE WITHOUT STATUS MIGRAINOSUS, NOT INTRACTABLE: ICD-10-CM

## 2024-03-07 DIAGNOSIS — Z00.00 MEDICARE ANNUAL WELLNESS VISIT, SUBSEQUENT: ICD-10-CM

## 2024-03-07 DIAGNOSIS — I73.00 RAYNAUD'S PHENOMENON WITHOUT GANGRENE: ICD-10-CM

## 2024-03-07 DIAGNOSIS — N80.9 ENDOMETRIOSIS: ICD-10-CM

## 2024-03-07 DIAGNOSIS — E78.00 HYPERCHOLESTEROLEMIA: ICD-10-CM

## 2024-03-07 DIAGNOSIS — N20.0 RIGHT NEPHROLITHIASIS: ICD-10-CM

## 2024-03-07 PROBLEM — R20.0 LUE NUMBNESS: Status: RESOLVED | Noted: 2017-12-27 | Resolved: 2024-03-07

## 2024-03-07 PROBLEM — R56.9 SEIZURE-LIKE ACTIVITY (HCC): Status: RESOLVED | Noted: 2017-12-27 | Resolved: 2024-03-07

## 2024-03-07 PROCEDURE — 99214 OFFICE O/P EST MOD 30 MIN: CPT | Performed by: NURSE PRACTITIONER

## 2024-03-07 PROCEDURE — G0439 PPPS, SUBSEQ VISIT: HCPCS | Performed by: NURSE PRACTITIONER

## 2024-03-07 NOTE — PATIENT INSTRUCTIONS
Medicare Preventive Visit Patient Instructions  Thank you for completing your Welcome to Medicare Visit or Medicare Annual Wellness Visit today. Your next wellness visit will be due in one year (3/8/2025).  The screening/preventive services that you may require over the next 5-10 years are detailed below. Some tests may not apply to you based off risk factors and/or age. Screening tests ordered at today's visit but not completed yet may show as past due. Also, please note that scanned in results may not display below.  Preventive Screenings:  Service Recommendations Previous Testing/Comments   Colorectal Cancer Screening  * Colonoscopy    * Fecal Occult Blood Test (FOBT)/Fecal Immunochemical Test (FIT)  * Fecal DNA/Cologuard Test  * Flexible Sigmoidoscopy Age: 45-75 years old   Colonoscopy: every 10 years (may be performed more frequently if at higher risk)  OR  FOBT/FIT: every 1 year  OR  Cologuard: every 3 years  OR  Sigmoidoscopy: every 5 years  Screening may be recommended earlier than age 45 if at higher risk for colorectal cancer. Also, an individualized decision between you and your healthcare provider will decide whether screening between the ages of 76-85 would be appropriate. Colonoscopy: 07/21/2020  FOBT/FIT: Not on file  Cologuard: Not on file  Sigmoidoscopy: Not on file    Screening Current     Breast Cancer Screening Age: 40+ years old  Frequency: every 1-2 years  Not required if history of left and right mastectomy Mammogram: 02/14/2023    Screening Current   Cervical Cancer Screening Between the ages of 21-29, pap smear recommended once every 3 years.   Between the ages of 30-65, can perform pap smear with HPV co-testing every 5 years.   Recommendations may differ for women with a history of total hysterectomy, cervical cancer, or abnormal pap smears in past. Pap Smear: 10/09/2023    Screening Current   Hepatitis C Screening Once for adults born between 1945 and 1965  More frequently in patients at  high risk for Hepatitis C Hep C Antibody: Not on file    Screening Current   Diabetes Screening 1-2 times per year if you're at risk for diabetes or have pre-diabetes Fasting glucose: No results in last 5 years (No results in last 5 years)  A1C: No results in last 5 years (No results in last 5 years)      Cholesterol Screening Once every 5 years if you don't have a lipid disorder. May order more often based on risk factors. Lipid panel: 02/22/2023    Screening Not Indicated  History Lipid Disorder     Other Preventive Screenings Covered by Medicare:  Abdominal Aortic Aneurysm (AAA) Screening: covered once if your at risk. You're considered to be at risk if you have a family history of AAA.  Lung Cancer Screening: covers low dose CT scan once per year if you meet all of the following conditions: (1) Age 55-77; (2) No signs or symptoms of lung cancer; (3) Current smoker or have quit smoking within the last 15 years; (4) You have a tobacco smoking history of at least 20 pack years (packs per day multiplied by number of years you smoked); (5) You get a written order from a healthcare provider.  Glaucoma Screening: covered annually if you're considered high risk: (1) You have diabetes OR (2) Family history of glaucoma OR (3)  aged 50 and older OR (4)  American aged 65 and older  Osteoporosis Screening: covered every 2 years if you meet one of the following conditions: (1) You're estrogen deficient and at risk for osteoporosis based off medical history and other findings; (2) Have a vertebral abnormality; (3) On glucocorticoid therapy for more than 3 months; (4) Have primary hyperparathyroidism; (5) On osteoporosis medications and need to assess response to drug therapy.   Last bone density test (DXA Scan): Not on file.  HIV Screening: covered annually if you're between the age of 15-65. Also covered annually if you are younger than 15 and older than 65 with risk factors for HIV infection. For  pregnant patients, it is covered up to 3 times per pregnancy.    Immunizations:  Immunization Recommendations   Influenza Vaccine Annual influenza vaccination during flu season is recommended for all persons aged >= 6 months who do not have contraindications   Pneumococcal Vaccine   * Pneumococcal conjugate vaccine = PCV13 (Prevnar 13), PCV15 (Vaxneuvance), PCV20 (Prevnar 20)  * Pneumococcal polysaccharide vaccine = PPSV23 (Pneumovax) Adults 19-65 yo with certain risk factors or if 65+ yo  If never received any pneumonia vaccine: recommend Prevnar 20 (PCV20)  Give PCV20 if previously received 1 dose of PCV13 or PPSV23   Hepatitis B Vaccine 3 dose series if at intermediate or high risk (ex: diabetes, end stage renal disease, liver disease)   Respiratory syncytial virus (RSV) Vaccine - COVERED BY MEDICARE PART D  * RSVPreF3 (Arexvy) CDC recommends that adults 60 years of age and older may receive a single dose of RSV vaccine using shared clinical decision-making (SCDM)   Tetanus (Td) Vaccine - COST NOT COVERED BY MEDICARE PART B Following completion of primary series, a booster dose should be given every 10 years to maintain immunity against tetanus. Td may also be given as tetanus wound prophylaxis.   Tdap Vaccine - COST NOT COVERED BY MEDICARE PART B Recommended at least once for all adults. For pregnant patients, recommended with each pregnancy.   Shingles Vaccine (Shingrix) - COST NOT COVERED BY MEDICARE PART B  2 shot series recommended in those 19 years and older who have or will have weakened immune systems or those 50 years and older     Health Maintenance Due:      Topic Date Due    Breast Cancer Screening: Mammogram  02/14/2024    HIV Screening  12/12/2029 (Originally 5/4/1980)    Hepatitis C Screening  01/09/2030 (Originally 1965)    Colorectal Cancer Screening  07/21/2025     Immunizations Due:      Topic Date Due    COVID-19 Vaccine (3 - 2023-24 season) 09/01/2023     Advance Directives   What are  advance directives?  Advance directives are legal documents that state your wishes and plans for medical care. These plans are made ahead of time in case you lose your ability to make decisions for yourself. Advance directives can apply to any medical decision, such as the treatments you want, and if you want to donate organs.   What are the types of advance directives?  There are many types of advance directives, and each state has rules about how to use them. You may choose a combination of any of the following:  Living will:  This is a written record of the treatment you want. You can also choose which treatments you do not want, which to limit, and which to stop at a certain time. This includes surgery, medicine, IV fluid, and tube feedings.   Durable power of  for healthcare (DPAHC):  This is a written record that states who you want to make healthcare choices for you when you are unable to make them for yourself. This person, called a proxy, is usually a family member or a friend. You may choose more than 1 proxy.  Do not resuscitate (DNR) order:  A DNR order is used in case your heart stops beating or you stop breathing. It is a request not to have certain forms of treatment, such as CPR. A DNR order may be included in other types of advance directives.  Medical directive:  This covers the care that you want if you are in a coma, near death, or unable to make decisions for yourself. You can list the treatments you want for each condition. Treatment may include pain medicine, surgery, blood transfusions, dialysis, IV or tube feedings, and a ventilator (breathing machine).  Values history:  This document has questions about your views, beliefs, and how you feel and think about life. This information can help others choose the care that you would choose.  Why are advance directives important?  An advance directive helps you control your care. Although spoken wishes may be used, it is better to have your  wishes written down. Spoken wishes can be misunderstood, or not followed. Treatments may be given even if you do not want them. An advance directive may make it easier for your family to make difficult choices about your care.   Weight Management   Why it is important to manage your weight:  Being overweight increases your risk of health conditions such as heart disease, high blood pressure, type 2 diabetes, and certain types of cancer. It can also increase your risk for osteoarthritis, sleep apnea, and other respiratory problems. Aim for a slow, steady weight loss. Even a small amount of weight loss can lower your risk of health problems.  How to lose weight safely:  A safe and healthy way to lose weight is to eat fewer calories and get regular exercise. You can lose up about 1 pound a week by decreasing the number of calories you eat by 500 calories each day.   Healthy meal plan for weight management:  A healthy meal plan includes a variety of foods, contains fewer calories, and helps you stay healthy. A healthy meal plan includes the following:  Eat whole-grain foods more often.  A healthy meal plan should contain fiber. Fiber is the part of grains, fruits, and vegetables that is not broken down by your body. Whole-grain foods are healthy and provide extra fiber in your diet. Some examples of whole-grain foods are whole-wheat breads and pastas, oatmeal, brown rice, and bulgur.  Eat a variety of vegetables every day.  Include dark, leafy greens such as spinach, kale, gerardo greens, and mustard greens. Eat yellow and orange vegetables such as carrots, sweet potatoes, and winter squash.   Eat a variety of fruits every day.  Choose fresh or canned fruit (canned in its own juice or light syrup) instead of juice. Fruit juice has very little or no fiber.  Eat low-fat dairy foods.  Drink fat-free (skim) milk or 1% milk. Eat fat-free yogurt and low-fat cottage cheese. Try low-fat cheeses such as mozzarella and other  reduced-fat cheeses.  Choose meat and other protein foods that are low in fat.  Choose beans or other legumes such as split peas or lentils. Choose fish, skinless poultry (chicken or turkey), or lean cuts of red meat (beef or pork). Before you cook meat or poultry, cut off any visible fat.   Use less fat and oil.  Try baking foods instead of frying them. Add less fat, such as margarine, sour cream, regular salad dressing and mayonnaise to foods. Eat fewer high-fat foods. Some examples of high-fat foods include french fries, doughnuts, ice cream, and cakes.  Eat fewer sweets.  Limit foods and drinks that are high in sugar. This includes candy, cookies, regular soda, and sweetened drinks.  Exercise:  Exercise at least 30 minutes per day on most days of the week. Some examples of exercise include walking, biking, dancing, and swimming. You can also fit in more physical activity by taking the stairs instead of the elevator or parking farther away from stores. Ask your healthcare provider about the best exercise plan for you.    © Copyright [employer] 2018 Information is for End User's use only and may not be sold, redistributed or otherwise used for commercial purposes. All illustrations and images included in CareNotes® are the copyrighted property of [employer].D.A[employer]M., Inc. or [employer]

## 2024-04-10 ENCOUNTER — HOSPITAL ENCOUNTER (OUTPATIENT)
Dept: MAMMOGRAPHY | Facility: MEDICAL CENTER | Age: 59
Discharge: HOME | End: 2024-04-10
Payer: COMMERCIAL

## 2024-04-10 VITALS — HEIGHT: 60 IN | BODY MASS INDEX: 27.14 KG/M2 | WEIGHT: 138.23 LBS

## 2024-04-10 DIAGNOSIS — Z01.419 WOMEN'S ANNUAL ROUTINE GYNECOLOGICAL EXAMINATION: ICD-10-CM

## 2024-04-10 PROCEDURE — 77067 SCR MAMMO BI INCL CAD: CPT

## 2024-04-10 PROCEDURE — 77063 BREAST TOMOSYNTHESIS BI: CPT

## 2024-05-01 PROBLEM — Z00.00 MEDICARE ANNUAL WELLNESS VISIT, SUBSEQUENT: Status: RESOLVED | Noted: 2024-03-07 | Resolved: 2024-05-01

## 2024-07-29 ENCOUNTER — APPOINTMENT (EMERGENCY)
Dept: RADIOLOGY | Facility: HOSPITAL | Age: 59
DRG: 101 | End: 2024-07-29
Payer: COMMERCIAL

## 2024-07-29 ENCOUNTER — HOSPITAL ENCOUNTER (INPATIENT)
Facility: HOSPITAL | Age: 59
LOS: 2 days | Discharge: HOME | DRG: 101 | End: 2024-08-01
Attending: EMERGENCY MEDICINE | Admitting: PSYCHIATRY & NEUROLOGY
Payer: COMMERCIAL

## 2024-07-29 DIAGNOSIS — R56.9 SEIZURE-LIKE ACTIVITY (HCC): Primary | ICD-10-CM

## 2024-07-29 LAB
ALBUMIN SERPL BCG-MCNC: 4.3 G/DL (ref 3.5–5)
ALP SERPL-CCNC: 67 U/L (ref 34–104)
ALT SERPL W P-5'-P-CCNC: 18 U/L (ref 7–52)
ANION GAP SERPL CALCULATED.3IONS-SCNC: 8 MMOL/L (ref 4–13)
APAP SERPL-MCNC: <2 UG/ML (ref 10–20)
AST SERPL W P-5'-P-CCNC: 23 U/L (ref 13–39)
BASOPHILS # BLD AUTO: 0.02 THOUSANDS/ÂΜL (ref 0–0.1)
BASOPHILS NFR BLD AUTO: 0 % (ref 0–1)
BILIRUB SERPL-MCNC: 0.43 MG/DL (ref 0.2–1)
BUN SERPL-MCNC: 15 MG/DL (ref 5–25)
CALCIUM SERPL-MCNC: 9.2 MG/DL (ref 8.4–10.2)
CARDIAC TROPONIN I PNL SERPL HS: 11 NG/L
CHLORIDE SERPL-SCNC: 103 MMOL/L (ref 96–108)
CK SERPL-CCNC: 120 U/L (ref 26–192)
CO2 SERPL-SCNC: 25 MMOL/L (ref 21–32)
CREAT SERPL-MCNC: 0.8 MG/DL (ref 0.6–1.3)
EOSINOPHIL # BLD AUTO: 0.03 THOUSAND/ÂΜL (ref 0–0.61)
EOSINOPHIL NFR BLD AUTO: 0 % (ref 0–6)
ERYTHROCYTE [DISTWIDTH] IN BLOOD BY AUTOMATED COUNT: 12.9 % (ref 11.6–15.1)
ETHANOL SERPL-MCNC: <10 MG/DL
GFR SERPL CREATININE-BSD FRML MDRD: 81 ML/MIN/1.73SQ M
GLUCOSE SERPL-MCNC: 129 MG/DL (ref 65–140)
HCT VFR BLD AUTO: 41.7 % (ref 34.8–46.1)
HGB BLD-MCNC: 14.4 G/DL (ref 11.5–15.4)
IMM GRANULOCYTES # BLD AUTO: 0.02 THOUSAND/UL (ref 0–0.2)
IMM GRANULOCYTES NFR BLD AUTO: 0 % (ref 0–2)
LACTATE SERPL-SCNC: 1.5 MMOL/L (ref 0.5–2)
LITHIUM SERPL-SCNC: <0.1 MMOL/L (ref 0.6–1.2)
LYMPHOCYTES # BLD AUTO: 1.82 THOUSANDS/ÂΜL (ref 0.6–4.47)
LYMPHOCYTES NFR BLD AUTO: 25 % (ref 14–44)
MCH RBC QN AUTO: 32.1 PG (ref 26.8–34.3)
MCHC RBC AUTO-ENTMCNC: 34.5 G/DL (ref 31.4–37.4)
MCV RBC AUTO: 93 FL (ref 82–98)
MONOCYTES # BLD AUTO: 0.55 THOUSAND/ÂΜL (ref 0.17–1.22)
MONOCYTES NFR BLD AUTO: 8 % (ref 4–12)
NEUTROPHILS # BLD AUTO: 4.9 THOUSANDS/ÂΜL (ref 1.85–7.62)
NEUTS SEG NFR BLD AUTO: 67 % (ref 43–75)
NRBC BLD AUTO-RTO: 0 /100 WBCS
PLATELET # BLD AUTO: 230 THOUSANDS/UL (ref 149–390)
PMV BLD AUTO: 11.4 FL (ref 8.9–12.7)
POTASSIUM SERPL-SCNC: 4 MMOL/L (ref 3.5–5.3)
PROT SERPL-MCNC: 7.5 G/DL (ref 6.4–8.4)
RBC # BLD AUTO: 4.48 MILLION/UL (ref 3.81–5.12)
SALICYLATES SERPL-MCNC: >5 MG/DL (ref 3–20)
SODIUM SERPL-SCNC: 136 MMOL/L (ref 135–147)
WBC # BLD AUTO: 7.34 THOUSAND/UL (ref 4.31–10.16)

## 2024-07-29 PROCEDURE — 70450 CT HEAD/BRAIN W/O DYE: CPT

## 2024-07-29 PROCEDURE — 82550 ASSAY OF CK (CPK): CPT

## 2024-07-29 PROCEDURE — 84443 ASSAY THYROID STIM HORMONE: CPT

## 2024-07-29 PROCEDURE — 82077 ASSAY SPEC XCP UR&BREATH IA: CPT

## 2024-07-29 PROCEDURE — 99285 EMERGENCY DEPT VISIT HI MDM: CPT | Performed by: EMERGENCY MEDICINE

## 2024-07-29 PROCEDURE — 85025 COMPLETE CBC W/AUTO DIFF WBC: CPT

## 2024-07-29 PROCEDURE — 72125 CT NECK SPINE W/O DYE: CPT

## 2024-07-29 PROCEDURE — 36415 COLL VENOUS BLD VENIPUNCTURE: CPT

## 2024-07-29 PROCEDURE — 99285 EMERGENCY DEPT VISIT HI MDM: CPT

## 2024-07-29 PROCEDURE — 96374 THER/PROPH/DIAG INJ IV PUSH: CPT

## 2024-07-29 PROCEDURE — 80178 ASSAY OF LITHIUM: CPT

## 2024-07-29 PROCEDURE — 80143 DRUG ASSAY ACETAMINOPHEN: CPT

## 2024-07-29 PROCEDURE — 84484 ASSAY OF TROPONIN QUANT: CPT | Performed by: EMERGENCY MEDICINE

## 2024-07-29 PROCEDURE — 80179 DRUG ASSAY SALICYLATE: CPT

## 2024-07-29 PROCEDURE — 83605 ASSAY OF LACTIC ACID: CPT

## 2024-07-29 PROCEDURE — 96375 TX/PRO/DX INJ NEW DRUG ADDON: CPT

## 2024-07-29 PROCEDURE — 80053 COMPREHEN METABOLIC PANEL: CPT

## 2024-07-29 PROCEDURE — 93005 ELECTROCARDIOGRAM TRACING: CPT

## 2024-07-29 RX ORDER — LORAZEPAM 2 MG/ML
1 INJECTION INTRAMUSCULAR ONCE
Status: COMPLETED | OUTPATIENT
Start: 2024-07-29 | End: 2024-07-29

## 2024-07-29 RX ORDER — LEVETIRACETAM 500 MG/5ML
2000 INJECTION, SOLUTION, CONCENTRATE INTRAVENOUS ONCE
Status: COMPLETED | OUTPATIENT
Start: 2024-07-29 | End: 2024-07-29

## 2024-07-29 RX ORDER — LORAZEPAM 2 MG/ML
2 INJECTION INTRAMUSCULAR
Status: DISCONTINUED | OUTPATIENT
Start: 2024-07-29 | End: 2024-08-01 | Stop reason: HOSPADM

## 2024-07-29 RX ORDER — ASPIRIN 81 MG/1
81 TABLET, CHEWABLE ORAL DAILY
Status: DISCONTINUED | OUTPATIENT
Start: 2024-07-30 | End: 2024-08-01 | Stop reason: HOSPADM

## 2024-07-29 RX ORDER — LEVETIRACETAM 500 MG/5ML
1000 INJECTION, SOLUTION, CONCENTRATE INTRAVENOUS ONCE
Status: COMPLETED | OUTPATIENT
Start: 2024-07-29 | End: 2024-07-29

## 2024-07-29 RX ADMIN — LEVETIRACETAM 1000 MG: 100 INJECTION, SOLUTION INTRAVENOUS at 21:07

## 2024-07-29 RX ADMIN — LEVETIRACETAM 2000 MG: 100 INJECTION, SOLUTION INTRAVENOUS at 22:29

## 2024-07-29 RX ADMIN — LORAZEPAM 1 MG: 2 INJECTION INTRAMUSCULAR; INTRAVENOUS at 20:58

## 2024-07-29 NOTE — ED NOTES
Per EMS, pt mother stated pt fell two days ago and hit her head bruising her right eye.  EMS stated the patient did not get checked out at that time.      Beatriz Morse RN  07/29/24 [id number]
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
## 2024-07-30 ENCOUNTER — APPOINTMENT (OUTPATIENT)
Dept: RADIOLOGY | Facility: HOSPITAL | Age: 59
DRG: 101 | End: 2024-07-30
Payer: COMMERCIAL

## 2024-07-30 ENCOUNTER — APPOINTMENT (INPATIENT)
Dept: NEUROLOGY | Facility: CLINIC | Age: 59
DRG: 101 | End: 2024-07-30
Payer: COMMERCIAL

## 2024-07-30 LAB
2HR DELTA HS TROPONIN: -2 NG/L
AMPHETAMINES SERPL QL SCN: NEGATIVE
ATRIAL RATE: 108 BPM
ATRIAL RATE: 94 BPM
BACTERIA UR QL AUTO: ABNORMAL /HPF
BARBITURATES UR QL: NEGATIVE
BENZODIAZ UR QL: NEGATIVE
BILIRUB UR QL STRIP: NEGATIVE
CARDIAC TROPONIN I PNL SERPL HS: 9 NG/L
CLARITY UR: CLEAR
COCAINE UR QL: NEGATIVE
COLOR UR: ABNORMAL
FENTANYL UR QL SCN: NEGATIVE
GLUCOSE UR STRIP-MCNC: NEGATIVE MG/DL
HGB UR QL STRIP.AUTO: NEGATIVE
HYDROCODONE UR QL SCN: NEGATIVE
KETONES UR STRIP-MCNC: ABNORMAL MG/DL
LEUKOCYTE ESTERASE UR QL STRIP: NEGATIVE
METHADONE UR QL: NEGATIVE
MUCOUS THREADS UR QL AUTO: ABNORMAL
NITRITE UR QL STRIP: NEGATIVE
NON-SQ EPI CELLS URNS QL MICRO: ABNORMAL /HPF
OPIATES UR QL SCN: NEGATIVE
OXYCODONE+OXYMORPHONE UR QL SCN: NEGATIVE
P AXIS: 68 DEGREES
P AXIS: 85 DEGREES
PCP UR QL: NEGATIVE
PH UR STRIP.AUTO: 6 [PH]
PR INTERVAL: 118 MS
PR INTERVAL: 120 MS
PROT UR STRIP-MCNC: ABNORMAL MG/DL
QRS AXIS: 73 DEGREES
QRS AXIS: 79 DEGREES
QRSD INTERVAL: 72 MS
QRSD INTERVAL: 76 MS
QT INTERVAL: 322 MS
QT INTERVAL: 362 MS
QTC INTERVAL: 431 MS
QTC INTERVAL: 452 MS
RBC #/AREA URNS AUTO: ABNORMAL /HPF
SP GR UR STRIP.AUTO: 1.02 (ref 1–1.03)
T WAVE AXIS: 43 DEGREES
T WAVE AXIS: 54 DEGREES
THC UR QL: NEGATIVE
TSH SERPL DL<=0.05 MIU/L-ACNC: 1.72 UIU/ML (ref 0.45–4.5)
UROBILINOGEN UR STRIP-ACNC: <2 MG/DL
VENTRICULAR RATE: 108 BPM
VENTRICULAR RATE: 94 BPM
WBC #/AREA URNS AUTO: ABNORMAL /HPF

## 2024-07-30 PROCEDURE — 95816 EEG AWAKE AND DROWSY: CPT | Performed by: PSYCHIATRY & NEUROLOGY

## 2024-07-30 PROCEDURE — A9585 GADOBUTROL INJECTION: HCPCS | Performed by: PSYCHIATRY & NEUROLOGY

## 2024-07-30 PROCEDURE — 36415 COLL VENOUS BLD VENIPUNCTURE: CPT | Performed by: EMERGENCY MEDICINE

## 2024-07-30 PROCEDURE — 70553 MRI BRAIN STEM W/O & W/DYE: CPT

## 2024-07-30 PROCEDURE — 93010 ELECTROCARDIOGRAM REPORT: CPT | Performed by: INTERNAL MEDICINE

## 2024-07-30 PROCEDURE — 95816 EEG AWAKE AND DROWSY: CPT

## 2024-07-30 PROCEDURE — 99205 OFFICE O/P NEW HI 60 MIN: CPT | Performed by: PSYCHIATRY & NEUROLOGY

## 2024-07-30 PROCEDURE — 80307 DRUG TEST PRSMV CHEM ANLYZR: CPT

## 2024-07-30 PROCEDURE — 84484 ASSAY OF TROPONIN QUANT: CPT | Performed by: EMERGENCY MEDICINE

## 2024-07-30 PROCEDURE — 81001 URINALYSIS AUTO W/SCOPE: CPT

## 2024-07-30 RX ORDER — GADOBUTROL 604.72 MG/ML
6 INJECTION INTRAVENOUS
Status: COMPLETED | OUTPATIENT
Start: 2024-07-30 | End: 2024-07-30

## 2024-07-30 RX ORDER — ENOXAPARIN SODIUM 100 MG/ML
40 INJECTION SUBCUTANEOUS
Status: DISCONTINUED | OUTPATIENT
Start: 2024-07-31 | End: 2024-08-01 | Stop reason: HOSPADM

## 2024-07-30 RX ADMIN — ASPIRIN 81 MG CHEWABLE TABLET 81 MG: 81 TABLET CHEWABLE at 09:08

## 2024-07-30 RX ADMIN — GADOBUTROL 6 ML: 604.72 INJECTION INTRAVENOUS at 15:16
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
## 2024-07-30 NOTE — ED NOTES
EEG tech at bedside. Family at bedside and updated on plan of care     Laurel Mcgrath, RN  07/30/24 2104

## 2024-07-30 NOTE — ED NOTES
Patient unable to answer MRI questionnaire. Attempted to call family member; no answer. Will retry later      Sharad Sullivan RN  07/29/24 4939
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
## 2024-07-31 ENCOUNTER — TELEPHONE (OUTPATIENT)
Age: 59
End: 2024-07-31

## 2024-07-31 PROBLEM — T74.11XA ABUSE, ADULT PHYSICAL: Status: ACTIVE | Noted: 2024-07-31

## 2024-07-31 PROCEDURE — 99232 SBSQ HOSP IP/OBS MODERATE 35: CPT | Performed by: PSYCHIATRY & NEUROLOGY

## 2024-07-31 PROCEDURE — 92610 EVALUATE SWALLOWING FUNCTION: CPT

## 2024-07-31 RX ORDER — OMEGA-3-ACID ETHYL ESTERS 1 G/1
CAPSULE, LIQUID FILLED ORAL 2 TIMES DAILY
Status: ON HOLD | COMMUNITY

## 2024-07-31 RX ORDER — DIPHENOXYLATE HYDROCHLORIDE AND ATROPINE SULFATE 2.5; .025 MG/1; MG/1
1 TABLET ORAL DAILY
Status: ON HOLD | COMMUNITY

## 2024-07-31 RX ADMIN — ASPIRIN 81 MG CHEWABLE TABLET 81 MG: 81 TABLET CHEWABLE at 09:20

## 2024-07-31 RX ADMIN — ENOXAPARIN SODIUM 40 MG: 40 INJECTION SUBCUTANEOUS at 09:20

## 2024-07-31 NOTE — TELEPHONE ENCOUNTER
Received call from pt's mom Loly. She says pt is admitted to [facility]. She says Dr. Mcnair was in to see pt for inpatient consultation.     Pt's mom was not there for the consultation. She is wondering if Dr. Mcnair is able to call her or see of there is a time he will be back to check on pt in the hospital.    Mom has questions about results of all the tests that were done.    Advised her that Dr. Mcnair would be notified of her request however, not sure of his inpatient schedule.    Mom asking if he has time for a call back.    Dr. Mcnair - Would you be able to call mom or do you know if you will be back to see pt so that mom can be there? Please advise. Thank you!    Best juaquin [phone], ok to leave detailed message.
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
## 2024-08-01 VITALS
OXYGEN SATURATION: 96 % | BODY MASS INDEX: 23.91 KG/M2 | RESPIRATION RATE: 14 BRPM | SYSTOLIC BLOOD PRESSURE: 132 MMHG | WEIGHT: 121.8 LBS | HEIGHT: 60 IN | TEMPERATURE: 98.5 F | HEART RATE: 75 BPM | DIASTOLIC BLOOD PRESSURE: 84 MMHG

## 2024-08-01 LAB
ANION GAP SERPL CALCULATED.3IONS-SCNC: 5 MMOL/L (ref 4–13)
BUN SERPL-MCNC: 12 MG/DL (ref 5–25)
CALCIUM SERPL-MCNC: 9.6 MG/DL (ref 8.4–10.2)
CHLORIDE SERPL-SCNC: 104 MMOL/L (ref 96–108)
CO2 SERPL-SCNC: 29 MMOL/L (ref 21–32)
CREAT SERPL-MCNC: 0.66 MG/DL (ref 0.6–1.3)
ERYTHROCYTE [DISTWIDTH] IN BLOOD BY AUTOMATED COUNT: 13.1 % (ref 11.6–15.1)
GFR SERPL CREATININE-BSD FRML MDRD: 97 ML/MIN/1.73SQ M
GLUCOSE SERPL-MCNC: 93 MG/DL (ref 65–140)
HCT VFR BLD AUTO: 43.7 % (ref 34.8–46.1)
HGB BLD-MCNC: 15 G/DL (ref 11.5–15.4)
MCH RBC QN AUTO: 31.6 PG (ref 26.8–34.3)
MCHC RBC AUTO-ENTMCNC: 34.3 G/DL (ref 31.4–37.4)
MCV RBC AUTO: 92 FL (ref 82–98)
PLATELET # BLD AUTO: 226 THOUSANDS/UL (ref 149–390)
PMV BLD AUTO: 11.2 FL (ref 8.9–12.7)
POTASSIUM SERPL-SCNC: 4.4 MMOL/L (ref 3.5–5.3)
RBC # BLD AUTO: 4.75 MILLION/UL (ref 3.81–5.12)
SODIUM SERPL-SCNC: 138 MMOL/L (ref 135–147)
WBC # BLD AUTO: 6.24 THOUSAND/UL (ref 4.31–10.16)

## 2024-08-01 PROCEDURE — 97166 OT EVAL MOD COMPLEX 45 MIN: CPT

## 2024-08-01 PROCEDURE — 85027 COMPLETE CBC AUTOMATED: CPT

## 2024-08-01 PROCEDURE — 97163 PT EVAL HIGH COMPLEX 45 MIN: CPT

## 2024-08-01 PROCEDURE — 80048 BASIC METABOLIC PNL TOTAL CA: CPT

## 2024-08-01 PROCEDURE — 99238 HOSP IP/OBS DSCHRG MGMT 30/<: CPT | Performed by: PSYCHIATRY & NEUROLOGY

## 2024-08-01 RX ADMIN — ENOXAPARIN SODIUM 40 MG: 40 INJECTION SUBCUTANEOUS at 07:35

## 2024-08-01 RX ADMIN — ASPIRIN 81 MG CHEWABLE TABLET 81 MG: 81 TABLET CHEWABLE at 07:35

## 2024-08-01 NOTE — PLAN OF CARE
Problem: Prexisting or High Potential for Compromised Skin Integrity  Goal: Skin integrity is maintained or improved  Description: INTERVENTIONS:  - Identify patients at risk for skin breakdown  - Assess and monitor skin integrity  - Assess and monitor nutrition and hydration status  - Monitor labs   - Assess for incontinence   - Turn and reposition patient  - Assist with mobility/ambulation  - Relieve pressure over bony prominences  - Avoid friction and shearing  - Provide appropriate hygiene as needed including keeping skin clean and dry  - Evaluate need for skin moisturizer/barrier cream  - Collaborate with interdisciplinary team   - Patient/family teaching  - Consider wound care consult   Outcome: Progressing     Problem: PAIN - ADULT  Goal: Verbalizes/displays adequate comfort level or baseline comfort level  Description: Interventions:  - Encourage patient to monitor pain and request assistance  - Assess pain using appropriate pain scale  - Administer analgesics based on type and severity of pain and evaluate response  - Implement non-pharmacological measures as appropriate and evaluate response  - Consider cultural and social influences on pain and pain management  - Notify physician/advanced practitioner if interventions unsuccessful or patient reports new pain  Outcome: Progressing     Problem: INFECTION - ADULT  Goal: Absence or prevention of progression during hospitalization  Description: INTERVENTIONS:  - Assess and monitor for signs and symptoms of infection  - Monitor lab/diagnostic results  - Monitor all insertion sites, i.e. indwelling lines, tubes, and drains  - Monitor endotracheal if appropriate and nasal secretions for changes in amount and color  - [place] appropriate cooling/warming therapies per order  - Administer medications as ordered  - Instruct and encourage patient and family to use good hand hygiene technique  - Identify and instruct in appropriate isolation precautions for  identified infection/condition  Outcome: Progressing  Goal: Absence of fever/infection during neutropenic period  Description: INTERVENTIONS:  - Monitor WBC    Outcome: Progressing     Problem: SAFETY ADULT  Goal: Patient will remain free of falls  Description: INTERVENTIONS:  - Educate patient/family on patient safety including physical limitations  - Instruct patient to call for assistance with activity   - Consult OT/PT to assist with strengthening/mobility   - Keep Call bell within reach  - Keep bed low and locked with side rails adjusted as appropriate  - Keep care items and personal belongings within reach  - Initiate and maintain comfort rounds  - Make Fall Risk Sign visible to staff  - Offer Toileting every 3 Hours, in advance of need  - Initiate/Maintain bed alarm  - Obtain necessary fall risk management equipment:   - Apply yellow socks and bracelet for high fall risk patients  - Consider moving patient to room near nurses station  Outcome: Progressing  Goal: Maintain or return to baseline ADL function  Description: INTERVENTIONS:  -  Assess patient's ability to carry out ADLs; assess patient's baseline for ADL function and identify physical deficits which impact ability to perform ADLs (bathing, care of mouth/teeth, toileting, grooming, dressing, etc.)  - Assess/evaluate cause of self-care deficits   - Assess range of motion  - Assess patient's mobility; develop plan if impaired  - Assess patient's need for assistive devices and provide as appropriate  - Encourage maximum independence but intervene and supervise when necessary  - Involve family in performance of ADLs  - Assess for home care needs following discharge   - Consider OT consult to assist with ADL evaluation and planning for discharge  - Provide patient education as appropriate  Outcome: Progressing  Goal: Maintains/Returns to pre admission functional level  Description: INTERVENTIONS:  - Perform AM-PAC 6 Click Basic Mobility/ Daily Activity  assessment daily.  - Set and communicate daily mobility goal to care team and patient/family/caregiver.   - Collaborate with rehabilitation services on mobility goals if consulted  - Perform Range of Motion 3 times a day.  - Reposition patient every 3 hours.  - Dangle patient 3 times a day  - Stand patient 3 times a day  - Ambulate patient 3 times a day  - Out of bed to chair 3 times a day   - Out of bed for meals 3 times a day  - Out of bed for toileting  - Record patient progress and toleration of activity level   Outcome: Progressing     Problem: DISCHARGE PLANNING  Goal: Discharge to home or other facility with appropriate resources  Description: INTERVENTIONS:  - Identify barriers to discharge w/patient and caregiver  - Arrange for needed discharge resources and transportation as appropriate  - Identify discharge learning needs (meds, wound care, etc.)  - Arrange for interpretive services to assist at discharge as needed  - Refer to Case Management Department for coordinating discharge planning if the patient needs post-hospital services based on physician/advanced practitioner order or complex needs related to functional status, cognitive ability, or social support system  Outcome: Progressing     Problem: Knowledge Deficit  Goal: Patient/family/caregiver demonstrates understanding of disease process, treatment plan, medications, and discharge instructions  Description: Complete learning assessment and assess knowledge base.  Interventions:  - Provide teaching at level of understanding  - Provide teaching via preferred learning methods  Outcome: Progressing
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
## 2024-08-02 ENCOUNTER — TRANSITIONAL CARE MANAGEMENT (OUTPATIENT)
Dept: FAMILY MEDICINE CLINIC | Facility: CLINIC | Age: 59
End: 2024-08-02

## 2024-08-02 NOTE — UTILIZATION REVIEW
NOTIFICATION OF ADMISSION DISCHARGE   This is a Notification of Discharge from [facility]. Please be advised that this patient has been discharge from our facility. Below you will find the admission and discharge date and time including the patient’s disposition.   UTILIZATION REVIEW CONTACT:  Heather Ragland  Utilization   Network Utilization Review Department  Phone: [phone] x carefully listen to the prompts. All voicemails are confidential.  Email: [email or web address]     ADMISSION INFORMATION  PRESENTATION DATE: 7/29/2024  6:28 PM  OBERVATION ADMISSION DATE: N/A  INPATIENT ADMISSION DATE: 7/30/24  9:20 AM   DISCHARGE DATE: 8/1/2024  2:13 PM   DISPOSITION:Home/Self Care    Network Utilization Review Department  ATTENTION: Please call with any questions or concerns to [phone] and carefully listen to the prompts so that you are directed to the right person. All voicemails are confidential.   For Discharge needs, contact Care Management DC Support Team at [phone] opt. 2  Send all requests for admission clinical reviews, approved or denied determinations and any other requests to dedicated fax number below belonging to the campus where the patient is receiving treatment. List of dedicated fax numbers for the Facilities:  FACILITY NAME UR FAX NUMBER   ADMISSION DENIALS (Administrative/Medical Necessity) [phone]   DISCHARGE SUPPORT TEAM ([facility]) [phone]   PARENT CHILD HEALTH (Maternity/NICU/Pediatrics) [phone]   [facility] [phone]   [facility] [phone]   [facility] [phone]   [facility] [phone]   [facility] [phone]   [facility] [phone]   [facility] [phone]   [facility] [phone]    [facility] [phone]   [facility] [phone]   [facility] [phone]   [facility] [phone]

## 2024-08-03 ENCOUNTER — APPOINTMENT (EMERGENCY)
Dept: RADIOLOGY | Facility: HOSPITAL | Age: 59
DRG: 880 | End: 2024-08-03
Payer: COMMERCIAL

## 2024-08-03 ENCOUNTER — HOSPITAL ENCOUNTER (INPATIENT)
Facility: HOSPITAL | Age: 59
LOS: 4 days | Discharge: HOME | DRG: 880 | End: 2024-08-07
Attending: EMERGENCY MEDICINE | Admitting: INTERNAL MEDICINE
Payer: COMMERCIAL

## 2024-08-03 DIAGNOSIS — R56.9 SEIZURE-LIKE ACTIVITY (HCC): Primary | ICD-10-CM

## 2024-08-03 DIAGNOSIS — F44.5 PSYCHOGENIC NONEPILEPTIC SEIZURE: ICD-10-CM

## 2024-08-03 LAB
ALBUMIN SERPL BCG-MCNC: 4 G/DL (ref 3.5–5)
ALP SERPL-CCNC: 53 U/L (ref 34–104)
ALT SERPL W P-5'-P-CCNC: 18 U/L (ref 7–52)
ANION GAP SERPL CALCULATED.3IONS-SCNC: 8 MMOL/L (ref 4–13)
AST SERPL W P-5'-P-CCNC: 25 U/L (ref 13–39)
ATRIAL RATE: 78 BPM
BASOPHILS # BLD AUTO: 0.02 THOUSANDS/ÂΜL (ref 0–0.1)
BASOPHILS NFR BLD AUTO: 0 % (ref 0–1)
BILIRUB SERPL-MCNC: 0.35 MG/DL (ref 0.2–1)
BUN SERPL-MCNC: 12 MG/DL (ref 5–25)
CALCIUM SERPL-MCNC: 8.9 MG/DL (ref 8.4–10.2)
CARDIAC TROPONIN I PNL SERPL HS: 5 NG/L
CHLORIDE SERPL-SCNC: 106 MMOL/L (ref 96–108)
CO2 SERPL-SCNC: 25 MMOL/L (ref 21–32)
CREAT SERPL-MCNC: 0.68 MG/DL (ref 0.6–1.3)
EOSINOPHIL # BLD AUTO: 0.07 THOUSAND/ÂΜL (ref 0–0.61)
EOSINOPHIL NFR BLD AUTO: 2 % (ref 0–6)
ERYTHROCYTE [DISTWIDTH] IN BLOOD BY AUTOMATED COUNT: 13.2 % (ref 11.6–15.1)
GFR SERPL CREATININE-BSD FRML MDRD: 96 ML/MIN/1.73SQ M
GLUCOSE SERPL-MCNC: 89 MG/DL (ref 65–140)
HCT VFR BLD AUTO: 37.3 % (ref 34.8–46.1)
HGB BLD-MCNC: 12.4 G/DL (ref 11.5–15.4)
IMM GRANULOCYTES # BLD AUTO: 0 THOUSAND/UL (ref 0–0.2)
IMM GRANULOCYTES NFR BLD AUTO: 0 % (ref 0–2)
LYMPHOCYTES # BLD AUTO: 1.42 THOUSANDS/ÂΜL (ref 0.6–4.47)
LYMPHOCYTES NFR BLD AUTO: 30 % (ref 14–44)
MAGNESIUM SERPL-MCNC: 2 MG/DL (ref 1.9–2.7)
MCH RBC QN AUTO: 31.2 PG (ref 26.8–34.3)
MCHC RBC AUTO-ENTMCNC: 33.2 G/DL (ref 31.4–37.4)
MCV RBC AUTO: 94 FL (ref 82–98)
MONOCYTES # BLD AUTO: 0.31 THOUSAND/ÂΜL (ref 0.17–1.22)
MONOCYTES NFR BLD AUTO: 7 % (ref 4–12)
NEUTROPHILS # BLD AUTO: 2.87 THOUSANDS/ÂΜL (ref 1.85–7.62)
NEUTS SEG NFR BLD AUTO: 61 % (ref 43–75)
NRBC BLD AUTO-RTO: 0 /100 WBCS
P AXIS: 61 DEGREES
PLATELET # BLD AUTO: 191 THOUSANDS/UL (ref 149–390)
PMV BLD AUTO: 11.6 FL (ref 8.9–12.7)
POTASSIUM SERPL-SCNC: 4.2 MMOL/L (ref 3.5–5.3)
PR INTERVAL: 134 MS
PROT SERPL-MCNC: 6.9 G/DL (ref 6.4–8.4)
QRS AXIS: 56 DEGREES
QRSD INTERVAL: 76 MS
QT INTERVAL: 364 MS
QTC INTERVAL: 414 MS
RBC # BLD AUTO: 3.98 MILLION/UL (ref 3.81–5.12)
SODIUM SERPL-SCNC: 139 MMOL/L (ref 135–147)
T WAVE AXIS: 47 DEGREES
TSH SERPL DL<=0.05 MIU/L-ACNC: 1.29 UIU/ML (ref 0.45–4.5)
VENTRICULAR RATE: 78 BPM
WBC # BLD AUTO: 4.69 THOUSAND/UL (ref 4.31–10.16)

## 2024-08-03 PROCEDURE — 84484 ASSAY OF TROPONIN QUANT: CPT

## 2024-08-03 PROCEDURE — 93010 ELECTROCARDIOGRAM REPORT: CPT | Performed by: INTERNAL MEDICINE

## 2024-08-03 PROCEDURE — 70450 CT HEAD/BRAIN W/O DYE: CPT

## 2024-08-03 PROCEDURE — 36415 COLL VENOUS BLD VENIPUNCTURE: CPT

## 2024-08-03 PROCEDURE — 80053 COMPREHEN METABOLIC PANEL: CPT | Performed by: EMERGENCY MEDICINE

## 2024-08-03 PROCEDURE — 85025 COMPLETE CBC W/AUTO DIFF WBC: CPT

## 2024-08-03 PROCEDURE — 83735 ASSAY OF MAGNESIUM: CPT

## 2024-08-03 PROCEDURE — 84443 ASSAY THYROID STIM HORMONE: CPT | Performed by: EMERGENCY MEDICINE

## 2024-08-03 PROCEDURE — 99223 1ST HOSP IP/OBS HIGH 75: CPT | Performed by: INTERNAL MEDICINE

## 2024-08-03 PROCEDURE — 71045 X-RAY EXAM CHEST 1 VIEW: CPT

## 2024-08-03 PROCEDURE — 99285 EMERGENCY DEPT VISIT HI MDM: CPT

## 2024-08-03 PROCEDURE — 99285 EMERGENCY DEPT VISIT HI MDM: CPT | Performed by: EMERGENCY MEDICINE

## 2024-08-03 PROCEDURE — 93005 ELECTROCARDIOGRAM TRACING: CPT

## 2024-08-03 RX ORDER — ACETAMINOPHEN 325 MG/1
650 TABLET ORAL EVERY 6 HOURS PRN
Status: DISCONTINUED | OUTPATIENT
Start: 2024-08-03 | End: 2024-08-07 | Stop reason: HOSPADM

## 2024-08-03 RX ORDER — CHLORAL HYDRATE 500 MG
1000 CAPSULE ORAL 2 TIMES DAILY
Status: DISCONTINUED | OUTPATIENT
Start: 2024-08-03 | End: 2024-08-07 | Stop reason: HOSPADM

## 2024-08-03 RX ORDER — MAGNESIUM HYDROXIDE/ALUMINUM HYDROXICE/SIMETHICONE 120; 1200; 1200 MG/30ML; MG/30ML; MG/30ML
30 SUSPENSION ORAL EVERY 6 HOURS PRN
Status: DISCONTINUED | OUTPATIENT
Start: 2024-08-03 | End: 2024-08-07 | Stop reason: HOSPADM

## 2024-08-03 RX ORDER — ESTRADIOL 1 MG/1
1 TABLET ORAL DAILY
Status: DISCONTINUED | OUTPATIENT
Start: 2024-08-04 | End: 2024-08-07 | Stop reason: HOSPADM

## 2024-08-03 RX ORDER — ONDANSETRON 2 MG/ML
4 INJECTION INTRAMUSCULAR; INTRAVENOUS EVERY 6 HOURS PRN
Status: DISCONTINUED | OUTPATIENT
Start: 2024-08-03 | End: 2024-08-07 | Stop reason: HOSPADM

## 2024-08-03 RX ORDER — POLYETHYLENE GLYCOL 3350 17 G/17G
17 POWDER, FOR SOLUTION ORAL DAILY
Status: DISCONTINUED | OUTPATIENT
Start: 2024-08-04 | End: 2024-08-07 | Stop reason: HOSPADM

## 2024-08-03 RX ORDER — SODIUM CHLORIDE 9 MG/ML
3 INJECTION INTRAVENOUS
Status: DISCONTINUED | OUTPATIENT
Start: 2024-08-03 | End: 2024-08-07 | Stop reason: HOSPADM

## 2024-08-03 RX ADMIN — OMEGA-3 FATTY ACIDS CAP 1000 MG 1000 MG: 1000 CAP at 20:23
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
## 2024-08-03 NOTE — ASSESSMENT & PLAN NOTE
Patient presents with seizure-like activity.  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  Neurology has been consulted  Will await psychiatry evaluation  Seizure precautions  Monitor closely
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
## 2024-08-03 NOTE — Clinical Note
Case was discussed with LORI and the patient's admission status was agreed to be Admission Status: inpatient status to the service of Dr. Mauricio
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
## 2024-08-04 ENCOUNTER — APPOINTMENT (INPATIENT)
Dept: NEUROLOGY | Facility: CLINIC | Age: 59
DRG: 880 | End: 2024-08-04
Payer: COMMERCIAL

## 2024-08-04 LAB
ANION GAP SERPL CALCULATED.3IONS-SCNC: 8 MMOL/L (ref 4–13)
ATRIAL RATE: 95 BPM
BASOPHILS # BLD AUTO: 0.02 THOUSANDS/ÂΜL (ref 0–0.1)
BASOPHILS NFR BLD AUTO: 0 % (ref 0–1)
BUN SERPL-MCNC: 12 MG/DL (ref 5–25)
CALCIUM SERPL-MCNC: 8.6 MG/DL (ref 8.4–10.2)
CHLORIDE SERPL-SCNC: 106 MMOL/L (ref 96–108)
CO2 SERPL-SCNC: 24 MMOL/L (ref 21–32)
CREAT SERPL-MCNC: 0.62 MG/DL (ref 0.6–1.3)
EOSINOPHIL # BLD AUTO: 0.07 THOUSAND/ÂΜL (ref 0–0.61)
EOSINOPHIL NFR BLD AUTO: 1 % (ref 0–6)
ERYTHROCYTE [DISTWIDTH] IN BLOOD BY AUTOMATED COUNT: 13.3 % (ref 11.6–15.1)
GFR SERPL CREATININE-BSD FRML MDRD: 99 ML/MIN/1.73SQ M
GLUCOSE SERPL-MCNC: 94 MG/DL (ref 65–140)
HCT VFR BLD AUTO: 38.6 % (ref 34.8–46.1)
HGB BLD-MCNC: 12.9 G/DL (ref 11.5–15.4)
IMM GRANULOCYTES # BLD AUTO: 0.03 THOUSAND/UL (ref 0–0.2)
IMM GRANULOCYTES NFR BLD AUTO: 1 % (ref 0–2)
LYMPHOCYTES # BLD AUTO: 1.23 THOUSANDS/ÂΜL (ref 0.6–4.47)
LYMPHOCYTES NFR BLD AUTO: 23 % (ref 14–44)
MAGNESIUM SERPL-MCNC: 2.2 MG/DL (ref 1.9–2.7)
MCH RBC QN AUTO: 31.2 PG (ref 26.8–34.3)
MCHC RBC AUTO-ENTMCNC: 33.4 G/DL (ref 31.4–37.4)
MCV RBC AUTO: 94 FL (ref 82–98)
MONOCYTES # BLD AUTO: 0.38 THOUSAND/ÂΜL (ref 0.17–1.22)
MONOCYTES NFR BLD AUTO: 7 % (ref 4–12)
NEUTROPHILS # BLD AUTO: 3.72 THOUSANDS/ÂΜL (ref 1.85–7.62)
NEUTS SEG NFR BLD AUTO: 68 % (ref 43–75)
NRBC BLD AUTO-RTO: 0 /100 WBCS
P AXIS: 59 DEGREES
PLATELET # BLD AUTO: 169 THOUSANDS/UL (ref 149–390)
PMV BLD AUTO: 11.5 FL (ref 8.9–12.7)
POTASSIUM SERPL-SCNC: 4.3 MMOL/L (ref 3.5–5.3)
QRS AXIS: 66 DEGREES
QRSD INTERVAL: 76 MS
QT INTERVAL: 358 MS
QTC INTERVAL: 435 MS
RBC # BLD AUTO: 4.13 MILLION/UL (ref 3.81–5.12)
SODIUM SERPL-SCNC: 138 MMOL/L (ref 135–147)
T WAVE AXIS: 50 DEGREES
VENTRICULAR RATE: 89 BPM
VIT B12 SERPL-MCNC: 416 PG/ML (ref 180–914)
WBC # BLD AUTO: 5.45 THOUSAND/UL (ref 4.31–10.16)

## 2024-08-04 PROCEDURE — 99223 1ST HOSP IP/OBS HIGH 75: CPT | Performed by: PSYCHIATRY & NEUROLOGY

## 2024-08-04 PROCEDURE — 36415 COLL VENOUS BLD VENIPUNCTURE: CPT | Performed by: INTERNAL MEDICINE

## 2024-08-04 PROCEDURE — 80048 BASIC METABOLIC PNL TOTAL CA: CPT | Performed by: INTERNAL MEDICINE

## 2024-08-04 PROCEDURE — 4A10X4Z MONITORING OF CENTRAL NERVOUS ELECTRICAL ACTIVITY, EXTERNAL APPROACH: ICD-10-PCS | Performed by: STUDENT IN AN ORGANIZED HEALTH CARE EDUCATION/TRAINING PROGRAM

## 2024-08-04 PROCEDURE — 95700 EEG CONT REC W/VID EEG TECH: CPT

## 2024-08-04 PROCEDURE — 85025 COMPLETE CBC W/AUTO DIFF WBC: CPT | Performed by: INTERNAL MEDICINE

## 2024-08-04 PROCEDURE — 82607 VITAMIN B-12: CPT | Performed by: PSYCHIATRY & NEUROLOGY

## 2024-08-04 PROCEDURE — 83735 ASSAY OF MAGNESIUM: CPT | Performed by: INTERNAL MEDICINE

## 2024-08-04 PROCEDURE — 95715 VEEG EA 12-26HR INTMT MNTR: CPT

## 2024-08-04 PROCEDURE — 99232 SBSQ HOSP IP/OBS MODERATE 35: CPT | Performed by: INTERNAL MEDICINE

## 2024-08-04 PROCEDURE — 93010 ELECTROCARDIOGRAM REPORT: CPT | Performed by: INTERNAL MEDICINE

## 2024-08-04 RX ADMIN — ESTRADIOL 1 MG: 1 TABLET ORAL at 09:53

## 2024-08-04 RX ADMIN — OMEGA-3 FATTY ACIDS CAP 1000 MG 1000 MG: 1000 CAP at 17:41

## 2024-08-04 RX ADMIN — B-COMPLEX W/ C & FOLIC ACID TAB 1 TABLET: TAB at 09:53

## 2024-08-04 NOTE — PROGRESS NOTES
[facility]  Progress Note  Name: Kristi Hunt I  MRN: [id number]  Unit/Bed#: ED 20 I Date of Admission: 8/3/2024   Date of Service: 8/4/2024 I Hospital Day: 1    Assessment & Plan   * Non-epileptic behavioral events  Assessment & Plan  Patient presents with seizure-like activity.  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  Neurology has been consulted  vEEG ordered by neurology    psychiatry evaluation pending  Seizure precautions  Monitor closely    Learning difficulty  Assessment & Plan  Supportive cares               VTE Pharmacologic Prophylaxis: VTE Score: 1 Low Risk (Score 0-2) - Encourage Ambulation.    Mobility:      -[facility] Goal achieved. Continue to encourage appropriate mobility.    Patient Centered Rounds: I performed bedside rounds with nursing staff today.   Discussions with Specialists or Other Care Team Provider: neurology     Education and Discussions with Family / Patient: Updated  (mother) at bedside.    Total Time Spent on Date of Encounter in care of patient: 15 mins. This time was spent on one or more of the following: performing physical exam; counseling and coordination of care; obtaining or reviewing history; documenting in the medical record; reviewing/ordering tests, medications or procedures; communicating with other healthcare professionals and discussing with patient's family/caregivers.    Current Length of Stay: 1 day(s)  Current Patient Status: Inpatient   Certification Statement: The patient will continue to require additional inpatient hospital stay due to vEEG  Discharge Plan: Anticipate discharge in 24-48 hrs to home.    Code Status: Level 1 - Full Code    Subjective:   No complaints     Objective:     Vitals:   No data recorded.    HR:  [64-84] 80  Resp:  [16-20] 16  BP: (128-169)/(60-77) 128/70  SpO2:  [98 %] 98 %  Body mass index is 23.77 kg/m².      Input and Output Summary (last 24 hours):   No intake or output data in the 24 hours ending 08/04/24 1416    Physical Exam:   Physical Exam  Vitals and nursing note reviewed.   Constitutional:       General: She is not in acute distress.     Appearance: She is well-developed.   HENT:      Head: Normocephalic and atraumatic.   Eyes:      Conjunctiva/sclera: Conjunctivae normal.   Cardiovascular:      Rate and Rhythm: Normal rate and regular rhythm.      Heart sounds: No murmur heard.  Pulmonary:      Effort: Pulmonary effort is normal. No respiratory distress.      Breath sounds: Normal breath sounds.   Abdominal:      Palpations: Abdomen is soft.      Tenderness: There is no abdominal tenderness.   Musculoskeletal:         General: No swelling.      Cervical back: Neck supple.   Skin:     General: Skin is warm and dry.      Capillary Refill: Capillary refill takes less than 2 seconds.   Neurological:      Mental Status: She is alert.      Motor: No weakness.   Psychiatric:         Mood and Affect: Mood normal.          Additional Data:     Labs:  Results from last 7 days   Lab Units 08/04/24  0601   WBC Thousand/uL 5.45   HEMOGLOBIN g/dL 12.9   HEMATOCRIT % 38.6   PLATELETS Thousands/uL 169   SEGS PCT % 68   LYMPHO PCT % 23   MONO PCT % 7   EOS PCT % 1     Results from last 7 days   Lab Units 08/04/24  0601 08/03/24  1348   SODIUM mmol/L 138 139   POTASSIUM mmol/L 4.3 4.2   CHLORIDE mmol/L 106 106   CO2 mmol/L 24 25   BUN mg/dL 12 12   CREATININE mg/dL 0.62 0.68   ANION GAP mmol/L 8 8   CALCIUM mg/dL 8.6 8.9   ALBUMIN g/dL  --  4.0   TOTAL BILIRUBIN mg/dL  --  0.35   ALK PHOS U/L  --  53   ALT U/L  --  18   AST U/L  --  25   GLUCOSE RANDOM mg/dL 94 89                 Results from last 7 days   Lab Units 07/29/24  1855   LACTIC ACID mmol/L 1.5       Lines/Drains:  Invasive Devices       None                         Imaging: Reviewed radiology reports from this admission including: CT head    Recent Cultures (last  7 days):         Last 24 Hours Medication List:   Current Facility-Administered Medications   Medication Dose Route Frequency Provider Last Rate    acetaminophen  650 mg Oral Q6H PRN Sammy Salomon MD      aluminum-magnesium hydroxide-simethicone  30 mL Oral Q6H PRN Sammy Salomon MD      estradiol  1 mg Oral Daily Sammy Salomon MD      fish oil  1,000 mg Oral BID Sammy Salomon MD      multivitamin stress formula  1 tablet Oral Daily Sammy Salomon MD      ondansetron  4 mg Intravenous Q6H PRN Sammy Salomon MD      polyethylene glycol  17 g Oral Daily Sammy Salomon MD      sodium chloride (PF)  3 mL Intravenous Q1H PRN Jason Frances MD          Today, Patient Was Seen By: Andreas Bobo MD    **Please Note: This note may have been constructed using a voice recognition system.**

## 2024-08-04 NOTE — ASSESSMENT & PLAN NOTE
Patient presents with seizure-like activity.  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  Neurology has been consulted  vEEG ordered by neurology    psychiatry evaluation pending  Seizure precautions  Monitor closely

## 2024-08-05 ENCOUNTER — APPOINTMENT (INPATIENT)
Dept: NEUROLOGY | Facility: CLINIC | Age: 59
DRG: 880 | End: 2024-08-05
Payer: COMMERCIAL

## 2024-08-05 ENCOUNTER — TRANSITIONAL CARE MANAGEMENT (OUTPATIENT)
Dept: FAMILY MEDICINE CLINIC | Facility: CLINIC | Age: 59
End: 2024-08-05

## 2024-08-05 PROBLEM — R56.9 SEIZURE-LIKE ACTIVITY (HCC): Status: ACTIVE | Noted: 2024-08-05

## 2024-08-05 PROCEDURE — 99232 SBSQ HOSP IP/OBS MODERATE 35: CPT | Performed by: STUDENT IN AN ORGANIZED HEALTH CARE EDUCATION/TRAINING PROGRAM

## 2024-08-05 PROCEDURE — 4A10X4Z MONITORING OF CENTRAL NERVOUS ELECTRICAL ACTIVITY, EXTERNAL APPROACH: ICD-10-PCS | Performed by: STUDENT IN AN ORGANIZED HEALTH CARE EDUCATION/TRAINING PROGRAM

## 2024-08-05 PROCEDURE — 97162 PT EVAL MOD COMPLEX 30 MIN: CPT

## 2024-08-05 PROCEDURE — 95715 VEEG EA 12-26HR INTMT MNTR: CPT

## 2024-08-05 PROCEDURE — 97166 OT EVAL MOD COMPLEX 45 MIN: CPT

## 2024-08-05 PROCEDURE — 99232 SBSQ HOSP IP/OBS MODERATE 35: CPT | Performed by: INTERNAL MEDICINE

## 2024-08-05 RX ADMIN — OMEGA-3 FATTY ACIDS CAP 1000 MG 1000 MG: 1000 CAP at 09:13

## 2024-08-05 RX ADMIN — ESTRADIOL 1 MG: 1 TABLET ORAL at 10:38

## 2024-08-05 RX ADMIN — B-COMPLEX W/ C & FOLIC ACID TAB 1 TABLET: TAB at 09:14

## 2024-08-05 RX ADMIN — ACETAMINOPHEN 650 MG: 325 TABLET, FILM COATED ORAL at 09:14

## 2024-08-05 RX ADMIN — POLYETHYLENE GLYCOL 3350 17 G: 17 POWDER, FOR SOLUTION ORAL at 09:14

## 2024-08-05 RX ADMIN — OMEGA-3 FATTY ACIDS CAP 1000 MG 1000 MG: 1000 CAP at 17:28

## 2024-08-05 NOTE — PROGRESS NOTES
Pastoral Care Progress Note    2024  Patient: Kristi Hunt : 1965  Admission Date & Time: 8/3/2024 1248  MRN: [id number] CSN: [id number]         saw pt after receiving a consult. Pt in chair and happy to see Pastoral Care. Pt asked for prayer and shared concerns.  will continue to check in with pt and remains available.                24 1500   Clinical Encounter Type   Visited With Patient   Referral To    Consult Patient care   Restoration Encounters   Restoration Needs Prayer

## 2024-08-05 NOTE — UTILIZATION REVIEW
NOTIFICATION OF INPATIENT ADMISSION      AUTHORIZATION REQUEST   SERVICING FACILITY:   [facility]  Address: [address] [place] [address]  Tax ID: [id number]  NPI: [id number] ATTENDING PROVIDER:  Attending Name and NPI#: Melly Yeh Md [[id number]]  Address: [address] [place] [address]  Phone: [phone]   ADMISSION INFORMATION:  Place of Service: Inpatient [facility]  Place of Service Code: 21  Inpatient Admission Date/Time: 8/3/24  5:16 PM  Discharge Date/Time: No discharge date for patient encounter.  Admitting Diagnosis Code/Description:  Seizure-like activity (HCC) [R56.9]     UTILIZATION REVIEW CONTACT:  Heather Ragland, Utilization   Network Utilization Review Department  Phone: [phone]  Fax: [phone]  Email: [email or web address]  Contact for approvals/pending authorizations, clinical reviews, and discharge.     PHYSICIAN ADVISORY SERVICES:  Medical Necessity Denial & Yoqo-ap-Hsfa Review  Phone: [phone]  Fax: [phone]  Email: [email or web address]     DISCHARGE SUPPORT TEAM:  For Patients Discharge Needs & Updates  Phone: [phone] opt. 2 Fax: [phone]  Email: [email or web address]
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
## 2024-08-05 NOTE — TELEPHONE ENCOUNTER
Dr. Mcnair is out of the office this week. Can you check to see if this was taken care of while she was still admitted?
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
## 2024-08-05 NOTE — TELEPHONE ENCOUNTER
Called Loly. The phone rang many times and then was disconnected. No option to leave a message. Will need to try again at a later time.
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
## 2024-08-05 NOTE — ASSESSMENT & PLAN NOTE
Patient presents with seizure-like activity.  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  Neurology has been consulted  vEEG ordered by neurology   Seen by psychiatry, patient is not interested in individual therapy or any psychotropic medication.   Seizure precautions  Monitor closely
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
## 2024-08-06 ENCOUNTER — APPOINTMENT (INPATIENT)
Dept: NEUROLOGY | Facility: CLINIC | Age: 59
DRG: 880 | End: 2024-08-06
Payer: COMMERCIAL

## 2024-08-06 LAB
ANION GAP SERPL CALCULATED.3IONS-SCNC: 6 MMOL/L (ref 4–13)
BUN SERPL-MCNC: 17 MG/DL (ref 5–25)
CALCIUM SERPL-MCNC: 8.7 MG/DL (ref 8.4–10.2)
CHLORIDE SERPL-SCNC: 105 MMOL/L (ref 96–108)
CO2 SERPL-SCNC: 26 MMOL/L (ref 21–32)
CREAT SERPL-MCNC: 0.65 MG/DL (ref 0.6–1.3)
ERYTHROCYTE [DISTWIDTH] IN BLOOD BY AUTOMATED COUNT: 13.4 % (ref 11.6–15.1)
GFR SERPL CREATININE-BSD FRML MDRD: 98 ML/MIN/1.73SQ M
GLUCOSE SERPL-MCNC: 92 MG/DL (ref 65–140)
HCT VFR BLD AUTO: 38.2 % (ref 34.8–46.1)
HGB BLD-MCNC: 12.5 G/DL (ref 11.5–15.4)
MAGNESIUM SERPL-MCNC: 2 MG/DL (ref 1.9–2.7)
MCH RBC QN AUTO: 31.3 PG (ref 26.8–34.3)
MCHC RBC AUTO-ENTMCNC: 32.7 G/DL (ref 31.4–37.4)
MCV RBC AUTO: 96 FL (ref 82–98)
PLATELET # BLD AUTO: 139 THOUSANDS/UL (ref 149–390)
PMV BLD AUTO: 12.1 FL (ref 8.9–12.7)
POTASSIUM SERPL-SCNC: 4.6 MMOL/L (ref 3.5–5.3)
RBC # BLD AUTO: 4 MILLION/UL (ref 3.81–5.12)
SODIUM SERPL-SCNC: 137 MMOL/L (ref 135–147)
WBC # BLD AUTO: 5.22 THOUSAND/UL (ref 4.31–10.16)

## 2024-08-06 PROCEDURE — 95715 VEEG EA 12-26HR INTMT MNTR: CPT

## 2024-08-06 PROCEDURE — 95720 EEG PHY/QHP EA INCR W/VEEG: CPT | Performed by: PSYCHIATRY & NEUROLOGY

## 2024-08-06 PROCEDURE — 85027 COMPLETE CBC AUTOMATED: CPT | Performed by: INTERNAL MEDICINE

## 2024-08-06 PROCEDURE — 80048 BASIC METABOLIC PNL TOTAL CA: CPT | Performed by: INTERNAL MEDICINE

## 2024-08-06 PROCEDURE — 99231 SBSQ HOSP IP/OBS SF/LOW 25: CPT | Performed by: STUDENT IN AN ORGANIZED HEALTH CARE EDUCATION/TRAINING PROGRAM

## 2024-08-06 PROCEDURE — 83735 ASSAY OF MAGNESIUM: CPT | Performed by: INTERNAL MEDICINE

## 2024-08-06 RX ADMIN — B-COMPLEX W/ C & FOLIC ACID TAB 1 TABLET: TAB at 08:14

## 2024-08-06 RX ADMIN — ESTRADIOL 1 MG: 1 TABLET ORAL at 08:14

## 2024-08-06 RX ADMIN — OMEGA-3 FATTY ACIDS CAP 1000 MG 1000 MG: 1000 CAP at 17:08

## 2024-08-06 RX ADMIN — OMEGA-3 FATTY ACIDS CAP 1000 MG 1000 MG: 1000 CAP at 08:14
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
## 2024-08-06 NOTE — ASSESSMENT & PLAN NOTE
Patient presents with seizure-like activity.  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  Discussed with neurology and plan to continue EEG  Seen by psychiatry, patient is not interested in individual therapy or any psychotropic medication.   Seizure precautions
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
## 2024-08-06 NOTE — PROGRESS NOTES
Pastoral Care Progress Note    2024  Patient: Kristi Hunt : 1965  Admission Date & Time: 8/3/2024 1248  MRN: [id number] CSN: [id number]         met with pt at pt's request through a nurse. Pt want prayer and emotional support. Pt talked about worry and recognizes the harmful effect it plays. encouraged pt to pray when things are out of her control.  will continue to check-in and remains available.                24 1500   Clinical Encounter Type   Visited With Patient   Routine Visit Follow-up   Referral From Nurse   Referral To    Nondenominational Encounters   Nondenominational Needs Prayer

## 2024-08-07 ENCOUNTER — TELEPHONE (OUTPATIENT)
Age: 59
End: 2024-08-07

## 2024-08-07 VITALS
BODY MASS INDEX: 23.89 KG/M2 | WEIGHT: 121.69 LBS | SYSTOLIC BLOOD PRESSURE: 108 MMHG | OXYGEN SATURATION: 95 % | RESPIRATION RATE: 16 BRPM | TEMPERATURE: 98.5 F | DIASTOLIC BLOOD PRESSURE: 70 MMHG | HEART RATE: 77 BPM | HEIGHT: 60 IN

## 2024-08-07 PROCEDURE — 95720 EEG PHY/QHP EA INCR W/VEEG: CPT | Performed by: PSYCHIATRY & NEUROLOGY

## 2024-08-07 PROCEDURE — 99239 HOSP IP/OBS DSCHRG MGMT >30: CPT | Performed by: STUDENT IN AN ORGANIZED HEALTH CARE EDUCATION/TRAINING PROGRAM

## 2024-08-07 RX ADMIN — B-COMPLEX W/ C & FOLIC ACID TAB 1 TABLET: TAB at 09:34

## 2024-08-07 RX ADMIN — OMEGA-3 FATTY ACIDS CAP 1000 MG 1000 MG: 1000 CAP at 09:34

## 2024-08-07 RX ADMIN — ESTRADIOL 1 MG: 1 TABLET ORAL at 09:34
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
## 2024-08-07 NOTE — ASSESSMENT & PLAN NOTE
Patient presents with seizure-like activity.   Video EEG with no seizure or epileptiform discharges  Discussed with neurology and etiology likely PNES due to her underlying emotional stressors and trauma.   Seen by psychiatry, patient is not interested in individual therapy or any psychotropic medication.   Outpatient neurology follow-up with 72-hour ambulatory EEG

## 2024-08-07 NOTE — QUICK NOTE
cEEG did not show seizures or epileptiform discharges overnight. Patient did not have any events so far. D/c cEEG this AM.    Patient likely has NEBE (also known as PNES) with her underlying emotional stressors and trauma. Patient also accepts that she has lots of emotional stress    -Case discussed with Dr. Harman  -Will order ambulatory 72hr EEG monitoring to capture the events at home as she usually has events when she is at home  -Recommend setting with a psychologist or therapist to cope with her stress  -Rest of medical management per Primary team  -No further management from Neurology. Please call with questions or concerns    Kristi Hunt will need follow up in 6 weeks with epilepsy attending/AP/resident .  She will require a ambulatory EEG within 1-2 weeks.

## 2024-08-07 NOTE — TELEPHONE ENCOUNTER
Called Loly. The phone rang many times and was disconnected. There was no option to leave a message.
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
## 2024-08-07 NOTE — TELEPHONE ENCOUNTER
Pt called to schedule TCM appt.    Hospital: [facility]  Admission: 8/3/24  Discharged: 8/7/24    Warm transferred to [facility] with clerical to assist w/scheduling TCM.

## 2024-08-07 NOTE — RESTORATIVE TECHNICIAN NOTE
Restorative Technician Note      Patient Name: Kristi Hutn     Note Type: Mobility  Patient Position Upon Consult: Standing  Activity Performed: Ambulated; Dangled; Stood  Assistive Device: Other (Comment) (none)  Education Provided: Yes  Patient Position at End of Consult: Bedside chair; Other (comment) (Nurse present to assist pt with discharge)    Nick TUCKER, Restorative Technician,

## 2024-08-07 NOTE — PROGRESS NOTES
Pastoral Care Progress Note    2024  Patient: Kristi Hunt : 1965  Admission Date & Time: 8/3/2024 1248  MRN: [id number] CSN: [id number]         met with pt for check-in and learned pt would be returning home today. Pt expressed gratitude for care and shared new insights that she gain in this time.  remains available.                24 1300   Clinical Encounter Type   Visited With Patient   Routine Visit Follow-up   Sabianist Encounters   Sabianist Needs Prayer

## 2024-08-08 ENCOUNTER — HOSPITAL ENCOUNTER (EMERGENCY)
Facility: HOSPITAL | Age: 59
Discharge: HOME | End: 2024-08-08
Attending: EMERGENCY MEDICINE
Payer: COMMERCIAL

## 2024-08-08 ENCOUNTER — APPOINTMENT (EMERGENCY)
Dept: RADIOLOGY | Facility: HOSPITAL | Age: 59
End: 2024-08-08
Payer: COMMERCIAL

## 2024-08-08 ENCOUNTER — NURSE TRIAGE (OUTPATIENT)
Age: 59
End: 2024-08-08

## 2024-08-08 VITALS
DIASTOLIC BLOOD PRESSURE: 93 MMHG | TEMPERATURE: 97.8 F | OXYGEN SATURATION: 99 % | RESPIRATION RATE: 13 BRPM | HEART RATE: 80 BPM | SYSTOLIC BLOOD PRESSURE: 182 MMHG

## 2024-08-08 DIAGNOSIS — R56.9 SEIZURE-LIKE ACTIVITY (HCC): Primary | ICD-10-CM

## 2024-08-08 LAB
ALBUMIN SERPL BCG-MCNC: 4.1 G/DL (ref 3.5–5)
ALP SERPL-CCNC: 66 U/L (ref 34–104)
ALT SERPL W P-5'-P-CCNC: 18 U/L (ref 7–52)
ANION GAP SERPL CALCULATED.3IONS-SCNC: 7 MMOL/L (ref 4–13)
AST SERPL W P-5'-P-CCNC: 16 U/L (ref 13–39)
BASOPHILS # BLD AUTO: 0.03 THOUSANDS/ÂΜL (ref 0–0.1)
BASOPHILS NFR BLD AUTO: 1 % (ref 0–1)
BILIRUB SERPL-MCNC: 0.23 MG/DL (ref 0.2–1)
BUN SERPL-MCNC: 11 MG/DL (ref 5–25)
CALCIUM SERPL-MCNC: 8.9 MG/DL (ref 8.4–10.2)
CARDIAC TROPONIN I PNL SERPL HS: 5 NG/L
CHLORIDE SERPL-SCNC: 105 MMOL/L (ref 96–108)
CO2 SERPL-SCNC: 26 MMOL/L (ref 21–32)
CREAT SERPL-MCNC: 0.61 MG/DL (ref 0.6–1.3)
EOSINOPHIL # BLD AUTO: 0.07 THOUSAND/ÂΜL (ref 0–0.61)
EOSINOPHIL NFR BLD AUTO: 1 % (ref 0–6)
ERYTHROCYTE [DISTWIDTH] IN BLOOD BY AUTOMATED COUNT: 13.2 % (ref 11.6–15.1)
GFR SERPL CREATININE-BSD FRML MDRD: 100 ML/MIN/1.73SQ M
GLUCOSE SERPL-MCNC: 149 MG/DL (ref 65–140)
HCT VFR BLD AUTO: 39 % (ref 34.8–46.1)
HGB BLD-MCNC: 13 G/DL (ref 11.5–15.4)
IMM GRANULOCYTES # BLD AUTO: 0.02 THOUSAND/UL (ref 0–0.2)
IMM GRANULOCYTES NFR BLD AUTO: 0 % (ref 0–2)
LYMPHOCYTES # BLD AUTO: 1.32 THOUSANDS/ÂΜL (ref 0.6–4.47)
LYMPHOCYTES NFR BLD AUTO: 21 % (ref 14–44)
MCH RBC QN AUTO: 31.5 PG (ref 26.8–34.3)
MCHC RBC AUTO-ENTMCNC: 33.3 G/DL (ref 31.4–37.4)
MCV RBC AUTO: 94 FL (ref 82–98)
MONOCYTES # BLD AUTO: 0.46 THOUSAND/ÂΜL (ref 0.17–1.22)
MONOCYTES NFR BLD AUTO: 7 % (ref 4–12)
NEUTROPHILS # BLD AUTO: 4.3 THOUSANDS/ÂΜL (ref 1.85–7.62)
NEUTS SEG NFR BLD AUTO: 70 % (ref 43–75)
NRBC BLD AUTO-RTO: 0 /100 WBCS
PLATELET # BLD AUTO: 222 THOUSANDS/UL (ref 149–390)
PMV BLD AUTO: 11.1 FL (ref 8.9–12.7)
POTASSIUM SERPL-SCNC: 3.5 MMOL/L (ref 3.5–5.3)
PROT SERPL-MCNC: 7.2 G/DL (ref 6.4–8.4)
RBC # BLD AUTO: 4.13 MILLION/UL (ref 3.81–5.12)
SODIUM SERPL-SCNC: 138 MMOL/L (ref 135–147)
WBC # BLD AUTO: 6.2 THOUSAND/UL (ref 4.31–10.16)

## 2024-08-08 PROCEDURE — 99285 EMERGENCY DEPT VISIT HI MDM: CPT | Performed by: EMERGENCY MEDICINE

## 2024-08-08 PROCEDURE — 36415 COLL VENOUS BLD VENIPUNCTURE: CPT

## 2024-08-08 PROCEDURE — 96374 THER/PROPH/DIAG INJ IV PUSH: CPT

## 2024-08-08 PROCEDURE — 84484 ASSAY OF TROPONIN QUANT: CPT | Performed by: EMERGENCY MEDICINE

## 2024-08-08 PROCEDURE — 95720 EEG PHY/QHP EA INCR W/VEEG: CPT | Performed by: PSYCHIATRY & NEUROLOGY

## 2024-08-08 PROCEDURE — 71045 X-RAY EXAM CHEST 1 VIEW: CPT

## 2024-08-08 PROCEDURE — 99284 EMERGENCY DEPT VISIT MOD MDM: CPT

## 2024-08-08 PROCEDURE — 85025 COMPLETE CBC W/AUTO DIFF WBC: CPT | Performed by: EMERGENCY MEDICINE

## 2024-08-08 PROCEDURE — 80053 COMPREHEN METABOLIC PANEL: CPT | Performed by: EMERGENCY MEDICINE

## 2024-08-08 PROCEDURE — 93005 ELECTROCARDIOGRAM TRACING: CPT

## 2024-08-08 RX ORDER — ONDANSETRON 2 MG/ML
4 INJECTION INTRAMUSCULAR; INTRAVENOUS ONCE
Status: COMPLETED | OUTPATIENT
Start: 2024-08-08 | End: 2024-08-08

## 2024-08-08 RX ADMIN — SODIUM CHLORIDE 500 ML: 0.9 INJECTION, SOLUTION INTRAVENOUS at 18:33

## 2024-08-08 RX ADMIN — ONDANSETRON 4 MG: 2 INJECTION INTRAMUSCULAR; INTRAVENOUS at 18:33

## 2024-08-08 NOTE — ED NOTES
Patient had another episode of shaking in wheelchair when on way back to room. Patient woke up immediately after episode. C/o chest pain and dizziness with left arm tingling      Sera Handy RN  08/08/24 4654

## 2024-08-08 NOTE — ED PROVIDER NOTES
EMERGENCY DEPARTMENT ENCOUNTER NOTE    This note has been generated using a voice recognition software. There may be typographic, grammatic, or word substitution errors that have escaped editorial review.    Emergency Department Note- Kristi Hunt 59 y.o. female MRN: [id number]    Unit/Bed#: [facility] Encounter: [id number]  ?  CHIEF COMPLAINT  Chief Complaint   Patient presents with    Seizure - Prior Hx Of     Pt discharged from hospital yesterday. More tired than usual and weak. Having dizzy spells. And had a seizure today. Pt reports it seems as if they are happening more often.        HPI  Kristi Hunt is a 59 y.o. female with PMH of     REVIEW OF SYSTEMS    Constitutional: *** no fevers  Cardiac: *** no chest pain  Respiratory:  *** no cough, *** no shortness of breath,   GI: *** no abdominal pain  Endocrine: *** no diabetes  Neuro: *** no new focal weakness or numbness    PAST MEDICAL HISTORY  Past Medical History:   Diagnosis Date    Acute cystitis with hematuria     Last assessed: 3/1/16    Carpal tunnel syndrome of left wrist 12/2017    Confusion and disorientation 12/27/2017    Endometriosis     Kidney stone     Learning disabilities     Migraine headache     Raynaud's phenomenon     Reactive airway disease     Syncope 2014    Last assessed: 10/10/13       SURGICAL HISTORY  Past Surgical History:   Procedure Laterality Date    COLONOSCOPY W/ BIOPSIES  07/21/2020    5 YEAR RECOMMENDED = TUBULAR ADENOMA    EXPLORATORY LAPAROTOMY      HYSTERECTOMY  08/2014    Abdominal, supracervical    OOPHORECTOMY         FAMILY HISTORY  Family History   Adopted: Yes        CURRENT MEDICATIONS  No current facility-administered medications on file prior to encounter.     Current Outpatient Medications on File Prior to Encounter   Medication Sig    Calcium 500 MG tablet Take 1 tablet by mouth daily   (Patient not taking: Reported on 7/31/2024)    Cranberry 125 MG TABS Take by mouth Uknown mg    estradiol (ESTRACE) 1 mg  tablet Take 1 tablet (1 mg total) by mouth daily    Flaxseed, Linseed, (FLAX SEED OIL) 1300 MG CAPS Take 1 capsule by mouth daily (Patient not taking: Reported on 12/22/2022)    Lecithin 400 MG CAPS Take by mouth 2 (two) times a day Uknown mg    magnesium oxide (MAG-OX) 400 mg Take 1 tablet by mouth daily    multivitamin (THERAGRAN) TABS Take 1 tablet by mouth daily    omega-3-acid ethyl esters (LOVAZA) 1 g capsule Take by mouth 2 (two) times a day FISH OIL       ALLERGIES  Allergies   Allergen Reactions    Bee Venom     Morphine Hallucinations    Sulfa Antibiotics GI Intolerance    Penicillins Rash    Tetracycline Rash       SOCIAL HISTORY  Social History     Socioeconomic History    Marital status: Single     Spouse name: None    Number of children: None    Years of education: None    Highest education level: None   Occupational History    None   Tobacco Use    Smoking status: Never    Smokeless tobacco: Never   Vaping Use    Vaping status: Never Used   Substance and Sexual Activity    Alcohol use: Not Currently    Drug use: No    Sexual activity: Not Currently   Other Topics Concern    None   Social History Narrative    None     Social Determinants of Health     Financial Resource Strain: Low Risk  (3/7/2024)    Overall Financial Resource Strain (CARDIA)     Difficulty of Paying Living Expenses: Not hard at all   Food Insecurity: No Food Insecurity (8/5/2024)    Hunger Vital Sign     Worried About Running Out of Food in the Last Year: Never true     Ran Out of Food in the Last Year: Never true   Transportation Needs: No Transportation Needs (8/5/2024)    PRAPARE - Transportation     Lack of Transportation (Medical): No     Lack of Transportation (Non-Medical): No   Physical Activity: Not on file   Stress: Not on file   Social Connections: Not on file   Intimate Partner Violence: Not on file   Housing Stability: Low Risk  (8/5/2024)    Housing Stability Vital Sign     Unable to Pay for Housing in the Last Year: No      Number of Times Moved in the Last Year: 0     Homeless in the Last Year: No       PHYSICAL EXAM    ED Triage Vitals [08/08/24 1600]   Temperature Pulse Respirations Blood Pressure SpO2   97.8 °F (36.6 °C) 95 20 143/84 99 %      Temp Source Heart Rate Source Patient Position - Orthostatic VS BP Location FiO2 (%)   Temporal Monitor Sitting Left arm --      Pain Score       --         Vital signs and nursing notes reviewed    ***IF YOU ARE READING THIS, THE EXAM TEMPLATE BELOW HAS NOT BEEN UPDATED***    CONSTITUTIONAL: female appearing stated age resting in bed, in no acute distress  HEENT: atraumatic, normocephalic. Sclera anicteric, conjunctiva are not injected. Moist oral mucosa  CARDIOVASCULAR/CHEST: RRR, no M/R/G. 2+ radial pulses  PULMONARY: Breathing comfortably on RA. Breath sounds are equal and clear to auscultation  ABDOMEN: non-distended. BS present, normoactive. Non-tender  MSK: moves all extremities, no deformities, no peripheral edema, no calf asymmetry  NEURO: Awake, alert, and oriented x 3. Face symmetric. Moves all extremities spontaneously. No focal neurologic deficits  SKIN: Warm, appears well-perfused  MENTAL STATUS: Normal affect      ?  LABS AND TESTS    Results Reviewed       Procedure Component Value Units Date/Time    Comprehensive metabolic panel [[id number]]  (Abnormal) Collected: 08/08/24 1606    Lab Status: Final result Specimen: Blood from Hand, Left Updated: 08/08/24 1642     Sodium 138 mmol/L      Potassium 3.5 mmol/L      Chloride 105 mmol/L      CO2 26 mmol/L      ANION GAP 7 mmol/L      BUN 11 mg/dL      Creatinine 0.61 mg/dL      Glucose 149 mg/dL      Calcium 8.9 mg/dL      AST 16 U/L      ALT 18 U/L      Alkaline Phosphatase 66 U/L      Total Protein 7.2 g/dL      Albumin 4.1 g/dL      Total Bilirubin 0.23 mg/dL      eGFR 100 ml/min/1.73sq m     Narrative:      National Kidney Disease Foundation guidelines for Chronic Kidney Disease (CKD):     Stage 1 with normal or high GFR  (GFR > 90 mL/min/1.73 square meters)    Stage 2 Mild CKD (GFR = 60-89 mL/min/1.73 square meters)    Stage 3A Moderate CKD (GFR = 45-59 mL/min/1.73 square meters)    Stage 3B Moderate CKD (GFR = 30-44 mL/min/1.73 square meters)    Stage 4 Severe CKD (GFR = 15-29 mL/min/1.73 square meters)    Stage 5 End Stage CKD (GFR <15 mL/min/1.73 square meters)  Note: GFR calculation is accurate only with a steady state creatinine    HS Troponin I 2hr [[id number]]     Lab Status: No result Specimen: Blood     HS Troponin 0hr (reflex protocol) [[id number]]  (Normal) Collected: 08/08/24 1606    Lab Status: Final result Specimen: Blood from Hand, Left Updated: 08/08/24 1641     hs TnI 0hr 5 ng/L     CBC and differential [[id number]] Collected: 08/08/24 1606    Lab Status: Final result Specimen: Blood from Hand, Left Updated: 08/08/24 1614     WBC 6.20 Thousand/uL      RBC 4.13 Million/uL      Hemoglobin 13.0 g/dL      Hematocrit 39.0 %      MCV 94 fL      MCH 31.5 pg      MCHC 33.3 g/dL      RDW 13.2 %      MPV 11.1 fL      Platelets 222 Thousands/uL      nRBC 0 /100 WBCs      Segmented % 70 %      Immature Grans % 0 %      Lymphocytes % 21 %      Monocytes % 7 %      Eosinophils Relative 1 %      Basophils Relative 1 %      Absolute Neutrophils 4.30 Thousands/µL      Absolute Immature Grans 0.02 Thousand/uL      Absolute Lymphocytes 1.32 Thousands/µL      Absolute Monocytes 0.46 Thousand/µL      Eosinophils Absolute 0.07 Thousand/µL      Basophils Absolute 0.03 Thousands/µL             XR chest 1 view portable    (Results Pending)       ED COURSE & MEDICAL DECISION MAKING  Procedures             Medications - No data to display         MDM    CLINICAL IMPRESSION  Final diagnoses:   None       DISPOSITION  ED Disposition       None          Follow-up Information    None         DISCHARGE MEDICATIONS  Patient's Medications   Discharge Prescriptions    No medications on file

## 2024-08-08 NOTE — ED NOTES
Met with patient to provide outpatient resources per request of Dr. Hernandez. Patient expressed that she is going through a difficult time and feels she would benefit from talking to someone to help her through it. Patient denies previous mental health treatment outside of being prescribed Prozac by her OBGYN for a short period in her 20's. Patient expressed that she is not interested in medications because of the negative experience she had when taking Prozac. Patient identifies a positive support system and relies heavily on her relationship with God. Patient denies additional needs at this time and was thankful for the resources and support.     Soheila Rodríguez, NAKUL  08/08/24    [id number]

## 2024-08-08 NOTE — UTILIZATION REVIEW
NOTIFICATION OF ADMISSION DISCHARGE   This is a Notification of Discharge from [facility]. Please be advised that this patient has been discharge from our facility. Below you will find the admission and discharge date and time including the patient’s disposition.   UTILIZATION REVIEW CONTACT:  Heather Ragland  Utilization   Network Utilization Review Department  Phone: [phone] x carefully listen to the prompts. All voicemails are confidential.  Email: [email or web address]     ADMISSION INFORMATION  PRESENTATION DATE: 8/3/2024 12:48 PM  OBERVATION ADMISSION DATE: N/A  INPATIENT ADMISSION DATE: 8/3/24  5:16 PM   DISCHARGE DATE: 8/7/2024 12:38 PM   DISPOSITION:Home/Self Care    Network Utilization Review Department  ATTENTION: Please call with any questions or concerns to [phone] and carefully listen to the prompts so that you are directed to the right person. All voicemails are confidential.   For Discharge needs, contact Care Management DC Support Team at [phone] opt. 2  Send all requests for admission clinical reviews, approved or denied determinations and any other requests to dedicated fax number below belonging to the campus where the patient is receiving treatment. List of dedicated fax numbers for the Facilities:  FACILITY NAME UR FAX NUMBER   ADMISSION DENIALS (Administrative/Medical Necessity) [phone]   DISCHARGE SUPPORT TEAM ([facility]) [phone]   PARENT CHILD HEALTH (Maternity/NICU/Pediatrics) [phone]   [facility] [phone]   [facility] [phone]   [facility] [phone]   [facility] [phone]   [facility] [phone]   [facility] [phone]   [facility] [phone]   [facility] [phone]    [facility] [phone]   [facility] [phone]   [facility] [phone]   [facility] [phone]

## 2024-08-08 NOTE — ED NOTES
Patient had an episode of shaking back and forth in wheelchair for about 15 seconds. Patient woke up immediately after episode. Patient reports having chest pain prior to and after episode with dizziness. Charge nurse notified. No bed assignment at this time.      Sera Handy RN  08/08/24 [id number]
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
## 2024-08-08 NOTE — DISCHARGE INSTRUCTIONS
Please follow-up with primary care provider.  Please return to ED with new or worsening symptoms.  Please follow-up with outpatient resources given to you by our ED crisis worker.

## 2024-08-09 ENCOUNTER — RA CDI HCC (OUTPATIENT)
Dept: OTHER | Facility: HOSPITAL | Age: 59
End: 2024-08-09

## 2024-08-09 LAB
ATRIAL RATE: 90 BPM
P AXIS: 75 DEGREES
PR INTERVAL: 134 MS
QRS AXIS: 75 DEGREES
QRSD INTERVAL: 72 MS
QT INTERVAL: 370 MS
QTC INTERVAL: 452 MS
T WAVE AXIS: 52 DEGREES
VENTRICULAR RATE: 90 BPM

## 2024-08-09 PROCEDURE — 93010 ELECTROCARDIOGRAM REPORT: CPT | Performed by: INTERNAL MEDICINE
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
## 2024-08-16 ENCOUNTER — OFFICE VISIT (OUTPATIENT)
Dept: FAMILY MEDICINE CLINIC | Facility: CLINIC | Age: 59
End: 2024-08-16
Payer: COMMERCIAL

## 2024-08-16 VITALS
WEIGHT: 128 LBS | HEIGHT: 60 IN | DIASTOLIC BLOOD PRESSURE: 78 MMHG | HEART RATE: 81 BPM | OXYGEN SATURATION: 99 % | BODY MASS INDEX: 25.13 KG/M2 | SYSTOLIC BLOOD PRESSURE: 130 MMHG | RESPIRATION RATE: 16 BRPM

## 2024-08-16 DIAGNOSIS — R56.9 SEIZURE-LIKE ACTIVITY (HCC): ICD-10-CM

## 2024-08-16 DIAGNOSIS — T74.11XA PHYSICAL ABUSE OF ADULT, INITIAL ENCOUNTER: Primary | ICD-10-CM

## 2024-08-16 PROCEDURE — 99495 TRANSJ CARE MGMT MOD F2F 14D: CPT | Performed by: PHYSICIAN ASSISTANT
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
## 2024-10-14 ENCOUNTER — ANNUAL EXAM (OUTPATIENT)
Dept: OBGYN CLINIC | Facility: CLINIC | Age: 59
End: 2024-10-14
Payer: COMMERCIAL

## 2024-10-14 VITALS
HEIGHT: 60 IN | DIASTOLIC BLOOD PRESSURE: 76 MMHG | BODY MASS INDEX: 26 KG/M2 | SYSTOLIC BLOOD PRESSURE: 138 MMHG | WEIGHT: 132.4 LBS

## 2024-10-14 DIAGNOSIS — Z01.419 WOMEN'S ANNUAL ROUTINE GYNECOLOGICAL EXAMINATION: Primary | ICD-10-CM

## 2024-10-14 DIAGNOSIS — R23.2 HOT FLASHES: ICD-10-CM

## 2024-10-14 DIAGNOSIS — Z12.31 ENCOUNTER FOR SCREENING MAMMOGRAM FOR MALIGNANT NEOPLASM OF BREAST: ICD-10-CM

## 2024-10-14 PROCEDURE — G0101 CA SCREEN;PELVIC/BREAST EXAM: HCPCS | Performed by: OBSTETRICS & GYNECOLOGY

## 2024-10-14 RX ORDER — ESTRADIOL 1 MG/1
1 TABLET ORAL DAILY
Qty: 30 TABLET | Refills: 12 | Status: SHIPPED | OUTPATIENT
Start: 2024-10-14

## 2024-10-14 NOTE — PROGRESS NOTES
Subjective      Kristi Hunt is a 59 y.o. female who presents for annual well woman exam.     GYN:  Denies vaginal discharge, labial erythema or lesions, dyspareunia.  S/p supracervical abdominal hysterectomy and bilateral oophorectomy in   Patient is not sexually active    OB:   female    :  Denies dysuria, urinary frequency or urgency.  Denies hematuria, flank pain, incontinence.  Denies constipation, diarrhea    Breast:  Denies breast mass, skin changes, dimpling, reddening, nipple retraction.  Denies breast discharge.  Patient is adopted, unsure of family history     General:  Diet: Reviewed dietary calcium recommendations  Exercise: Reviewed recommendation of 150 minutes of moderate intensity exercise per week  ETOH use: no  Tobacco use: no  Recreational drug use: no    Screening:  Cervical cancer: last pap smear in . Results were NILM/HPV neg.  Breast cancer: last mammogram in 2024. Results were normal.  Colon cancer: last colonoscopy in . Recommended repeat in 5 years.    Review of Systems  Pertinent items are noted in HPI.      Objective      /76 (BP Location: Left arm, Patient Position: Sitting, Cuff Size: Standard)   Ht 5' (1.524 m)   Wt 60.1 kg (132 lb 6.4 oz)   BMI 25.86 kg/m²     Physical Exam  Constitutional:       Appearance: Normal appearance.   HENT:      Head: Normocephalic and atraumatic.   Cardiovascular:      Rate and Rhythm: Normal rate.   Pulmonary:      Effort: Pulmonary effort is normal. No respiratory distress.   Chest:   Breasts:     Right: Normal. No mass or tenderness.      Left: Normal. No mass or tenderness.   Abdominal:      Palpations: Abdomen is soft.      Tenderness: There is no abdominal tenderness.   Genitourinary:     General: Normal vulva.      Pubic Area: No rash.       Labia:         Right: No lesion.         Left: No lesion.       Urethra: No urethral lesion.      Vagina: No vaginal discharge or lesions.      Cervix: Normal. No lesion.       Uterus: Absent.       Adnexa: Right adnexa normal and left adnexa normal.        Right: No mass or tenderness.          Left: No mass or tenderness.        Rectum: No external hemorrhoid.      Comments: Uterus, ovaries surgically absent  Skin:     General: Skin is warm and dry.   Neurological:      Mental Status: She is alert.            Assessment     58 yo presents today for her annual exam     Plan   - Next pap smear due 2028   - Mammogram ordered for next year  - DEXA scan scheduled for December 2024

## 2024-12-26 ENCOUNTER — NURSE TRIAGE (OUTPATIENT)
Age: 59
End: 2024-12-26

## 2024-12-26 NOTE — TELEPHONE ENCOUNTER
Patient should not wait to be seen. Recommend ER Today as advised. If patient continues to refuse. Can offer appt for 140 pm tomorrow but again given symptoms would not recommend this as well.

## 2024-12-26 NOTE — TELEPHONE ENCOUNTER
Pt would like to take the 140 appt tomorrow. She does not feel comfortable going to the ER or calling 911. Pt is very grateful and relieved her pcp was able to fit her in.

## 2024-12-26 NOTE — TELEPHONE ENCOUNTER
"Chest pain with L arm numbness   Headache   Ongoing for days   150/100 bp taken [facility] last Friday     Advised ER  They refused   Preferred apt instead with female provider.    Next avail 1/9 at 2:20 pm   apt scheduled.       Reason for Disposition   Pain also in shoulder(s) or arm(s) or jaw    Answer Assessment - Initial Assessment Questions  1. LOCATION: \"Where does it hurt?\"        Left side  2. RADIATION: \"Does the pain go anywhere else?\" (e.g., into neck, jaw, arms, back)      Left arm   3. ONSET: \"When did the chest pain begin?\" (Minutes, hours or days)       Days   4. PATTERN: \"Does the pain come and go, or has it been constant since it started?\"  \"Does it get worse with exertion?\"       Consistent - yes- 12/24 today no sob   5. DURATION: \"How long does it last\" (e.g., seconds, minutes, hours)      Consistent   6. SEVERITY: \"How bad is the pain?\"  (e.g., Scale 1-10; mild, moderate, or severe)      5 chest 5 headache  7. CARDIAC RISK FACTORS: \"Do you have any history of heart problems or risk factors for heart disease?\" (e.g., angina, prior heart attack; diabetes, high blood pressure, high cholesterol, smoker, or strong family history of heart disease)      Chest pain  8. PULMONARY RISK FACTORS: \"Do you have any history of lung disease?\"  (e.g., blood clots in lung, asthma, emphysema, birth control pills)      no  9. CAUSE: \"What do you think is causing the chest pain?\"      Unsure \"I had this same problem years ago\"  10. OTHER SYMPTOMS: \"Do you have any other symptoms?\" (e.g., dizziness, nausea, vomiting, sweating, fever, difficulty breathing, cough)        headache  11. PREGNANCY: \"Is there any chance you are pregnant?\" \"When was your last menstrual period?\"        No    Protocols used: Chest Pain-Adult-[place]    "

## 2024-12-26 NOTE — TELEPHONE ENCOUNTER
Called patient because appointment was NEVER scheduled when she called back and spoke with pods. Advised patient she should really go to ER instead of waiting until tomorrow due to her symptoms. Said she would rather be at home with her mom and come in tomorrow to see Sophy at 1:40pm
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
## 2024-12-27 ENCOUNTER — OFFICE VISIT (OUTPATIENT)
Dept: FAMILY MEDICINE CLINIC | Facility: CLINIC | Age: 59
End: 2024-12-27
Payer: COMMERCIAL

## 2024-12-27 VITALS
OXYGEN SATURATION: 99 % | HEART RATE: 68 BPM | BODY MASS INDEX: 26.11 KG/M2 | DIASTOLIC BLOOD PRESSURE: 82 MMHG | WEIGHT: 133 LBS | SYSTOLIC BLOOD PRESSURE: 128 MMHG | HEIGHT: 60 IN

## 2024-12-27 DIAGNOSIS — R00.2 PALPITATIONS: ICD-10-CM

## 2024-12-27 DIAGNOSIS — R20.0 LEFT ARM NUMBNESS: ICD-10-CM

## 2024-12-27 DIAGNOSIS — D53.8 OTHER SPECIFIED NUTRITIONAL ANEMIAS: ICD-10-CM

## 2024-12-27 DIAGNOSIS — E78.00 HYPERCHOLESTEROLEMIA: ICD-10-CM

## 2024-12-27 DIAGNOSIS — Z13.1 SCREENING FOR DIABETES MELLITUS: ICD-10-CM

## 2024-12-27 DIAGNOSIS — R07.9 CHEST PAIN, UNSPECIFIED TYPE: Primary | ICD-10-CM

## 2024-12-27 PROCEDURE — 93000 ELECTROCARDIOGRAM COMPLETE: CPT | Performed by: NURSE PRACTITIONER

## 2024-12-27 PROCEDURE — 99214 OFFICE O/P EST MOD 30 MIN: CPT | Performed by: NURSE PRACTITIONER

## 2024-12-27 NOTE — PROGRESS NOTES
Name: Kristi Hunt      : 1965      MRN: [id number]  Encounter Provider: PACHECO Gutierrez  Encounter Date: 2024   Encounter department: [facility] PRIMARY CARE  :  Assessment & Plan  Chest pain, unspecified type  Acute, currently symptomatic but improved from yesterday. Symptoms started yesterday and was encouraged to go to ER several time but patient declined due to lack of transportation.  EKG showed no ST changes.   Ddx: valve abnormality, hyperthyroidism, heart failure, iron deficiency anemia, electrolyte abnormalities, hypomagnesemia  Will order Holter monitor, ECHO stress test, and refer to cardiology  Orders:  •  POCT ECG  •  Holter monitor; Future  •  Echo stress test, exercise; Future  •  Ambulatory Referral to Cardiology; Future  •  Lipid panel; Future  •  TSH, 3rd generation with Free T4 reflex; Future  •  Comprehensive metabolic panel; Future  •  CBC and differential; Future  •  Iron Panel (Includes Ferritin, Iron Sat%, Iron, and TIBC); Future  •  Magnesium; Future    Palpitations  Acute, currently asymptomatic. VS normal.  Will order blood work, Holter monitor, ECHO stress test, and refer to cardiology.      Orders:  •  Holter monitor; Future  •  Echo stress test, exercise; Future  •  Ambulatory Referral to Cardiology; Future  •  Lipid panel; Future  •  TSH, 3rd generation with Free T4 reflex; Future  •  Comprehensive metabolic panel; Future  •  CBC and differential; Future  •  Iron Panel (Includes Ferritin, Iron Sat%, Iron, and TIBC); Future  •  Magnesium; Future    Left arm numbness  Acute on chronic, currently symptomatic. Physical exam completely negative. Sensation in tact bilaterally. Pulses normal.  CN2-12 in tact. No signs of Stroke  Could be from valvular problem of heart, will do full work up for chest pain and palpitations with blood work, Holter monitor, and stress ECHO       Hypercholesterolemia         Screening for diabetes mellitus    Orders:  •  Hemoglobin  "A1C; Future    Other specified nutritional anemias    Orders:  •  Iron Panel (Includes Ferritin, Iron Sat%, Iron, and TIBC); Future           History of Present Illness     Presenting for episodes of chest pain   -most recent episode 5 days   -prior episode 8 years ago  -associated headaches and numbness of left arm  -currently only left arm numbness and mild chest pain  -chest pain is located in the center of the sternum and denies radiation to the shoulder  -is not taking any medicine for the pain OTC  -not triggered by movement or food  -\"shooting stabbing pain\"  -no fam hx because adoption  -hx of conversion disorder but no hx of heart disorders      Review of Systems   Constitutional:  Negative for chills and fever.   Respiratory:  Positive for cough and shortness of breath. Negative for wheezing.    Cardiovascular:  Positive for chest pain and palpitations. Negative for leg swelling.   Skin:  Negative for color change and rash.   Neurological:  Positive for numbness and headaches. Negative for dizziness, seizures and weakness.       Objective   /82 (BP Location: Left arm, Patient Position: Sitting, Cuff Size: Standard)   Pulse 68   Ht 5' (1.524 m)   Wt 60.3 kg (133 lb)   SpO2 99%   BMI 25.97 kg/m²      Physical Exam  Vitals and nursing note reviewed.   Constitutional:       General: She is not in acute distress.     Appearance: She is well-developed.   HENT:      Head: Normocephalic and atraumatic.   Eyes:      Conjunctiva/sclera: Conjunctivae normal.   Cardiovascular:      Rate and Rhythm: Normal rate and regular rhythm.      Pulses: Normal pulses.      Heart sounds: No murmur heard.     No friction rub. No gallop.   Pulmonary:      Effort: Pulmonary effort is normal. No respiratory distress.      Breath sounds: Normal breath sounds. No wheezing, rhonchi or rales.   Abdominal:      Palpations: Abdomen is soft.      Tenderness: There is no abdominal tenderness.   Musculoskeletal:         General: No " swelling.      Cervical back: Neck supple.   Skin:     General: Skin is warm and dry.      Capillary Refill: Capillary refill takes less than 2 seconds.   Neurological:      Mental Status: She is alert and oriented to person, place, and time. Mental status is at baseline.      Cranial Nerves: Cranial nerves 2-12 are intact.      Sensory: Sensation is intact. No sensory deficit.      Motor: Motor function is intact. No weakness.      Gait: Gait is intact.   Psychiatric:         Mood and Affect: Mood normal.

## 2024-12-29 ENCOUNTER — HOSPITAL ENCOUNTER (EMERGENCY)
Facility: HOSPITAL | Age: 59
Discharge: HOME | End: 2024-12-29
Attending: EMERGENCY MEDICINE
Payer: COMMERCIAL

## 2024-12-29 VITALS
DIASTOLIC BLOOD PRESSURE: 90 MMHG | HEART RATE: 75 BPM | OXYGEN SATURATION: 99 % | TEMPERATURE: 97.9 F | RESPIRATION RATE: 16 BRPM | SYSTOLIC BLOOD PRESSURE: 170 MMHG

## 2024-12-29 DIAGNOSIS — F41.9 ANXIETY: Primary | ICD-10-CM

## 2024-12-29 DIAGNOSIS — R40.4 TRANSIENT ALTERATION OF AWARENESS: ICD-10-CM

## 2024-12-29 LAB
ANION GAP SERPL CALCULATED.3IONS-SCNC: 7 MMOL/L (ref 4–13)
BASOPHILS # BLD AUTO: 0.01 THOUSANDS/ΜL (ref 0–0.1)
BASOPHILS NFR BLD AUTO: 0 % (ref 0–1)
BUN SERPL-MCNC: 13 MG/DL (ref 5–25)
CALCIUM SERPL-MCNC: 9.6 MG/DL (ref 8.4–10.2)
CHLORIDE SERPL-SCNC: 104 MMOL/L (ref 96–108)
CO2 SERPL-SCNC: 28 MMOL/L (ref 21–32)
CREAT SERPL-MCNC: 0.9 MG/DL (ref 0.6–1.3)
EOSINOPHIL # BLD AUTO: 0.07 THOUSAND/ΜL (ref 0–0.61)
EOSINOPHIL NFR BLD AUTO: 1 % (ref 0–6)
ERYTHROCYTE [DISTWIDTH] IN BLOOD BY AUTOMATED COUNT: 13.3 % (ref 11.6–15.1)
GFR SERPL CREATININE-BSD FRML MDRD: 70 ML/MIN/1.73SQ M
GLUCOSE SERPL-MCNC: 126 MG/DL (ref 65–140)
HCT VFR BLD AUTO: 39.4 % (ref 34.8–46.1)
HGB BLD-MCNC: 13.2 G/DL (ref 11.5–15.4)
IMM GRANULOCYTES # BLD AUTO: 0.01 THOUSAND/UL (ref 0–0.2)
IMM GRANULOCYTES NFR BLD AUTO: 0 % (ref 0–2)
LYMPHOCYTES # BLD AUTO: 1.78 THOUSANDS/ΜL (ref 0.6–4.47)
LYMPHOCYTES NFR BLD AUTO: 32 % (ref 14–44)
MCH RBC QN AUTO: 31.4 PG (ref 26.8–34.3)
MCHC RBC AUTO-ENTMCNC: 33.5 G/DL (ref 31.4–37.4)
MCV RBC AUTO: 94 FL (ref 82–98)
MONOCYTES # BLD AUTO: 0.37 THOUSAND/ΜL (ref 0.17–1.22)
MONOCYTES NFR BLD AUTO: 7 % (ref 4–12)
NEUTROPHILS # BLD AUTO: 3.25 THOUSANDS/ΜL (ref 1.85–7.62)
NEUTS SEG NFR BLD AUTO: 60 % (ref 43–75)
NRBC BLD AUTO-RTO: 0 /100 WBCS
PLATELET # BLD AUTO: 172 THOUSANDS/UL (ref 149–390)
PMV BLD AUTO: 11.3 FL (ref 8.9–12.7)
POTASSIUM SERPL-SCNC: 3.7 MMOL/L (ref 3.5–5.3)
RBC # BLD AUTO: 4.21 MILLION/UL (ref 3.81–5.12)
SODIUM SERPL-SCNC: 139 MMOL/L (ref 135–147)
WBC # BLD AUTO: 5.49 THOUSAND/UL (ref 4.31–10.16)

## 2024-12-29 PROCEDURE — 96374 THER/PROPH/DIAG INJ IV PUSH: CPT

## 2024-12-29 PROCEDURE — 36415 COLL VENOUS BLD VENIPUNCTURE: CPT

## 2024-12-29 PROCEDURE — 80048 BASIC METABOLIC PNL TOTAL CA: CPT

## 2024-12-29 PROCEDURE — 93005 ELECTROCARDIOGRAM TRACING: CPT

## 2024-12-29 PROCEDURE — 96375 TX/PRO/DX INJ NEW DRUG ADDON: CPT

## 2024-12-29 PROCEDURE — 96361 HYDRATE IV INFUSION ADD-ON: CPT

## 2024-12-29 PROCEDURE — 99285 EMERGENCY DEPT VISIT HI MDM: CPT | Performed by: EMERGENCY MEDICINE

## 2024-12-29 PROCEDURE — 99285 EMERGENCY DEPT VISIT HI MDM: CPT

## 2024-12-29 PROCEDURE — 85025 COMPLETE CBC W/AUTO DIFF WBC: CPT

## 2024-12-29 RX ORDER — LORAZEPAM 2 MG/ML
0.5 INJECTION INTRAMUSCULAR ONCE
Status: COMPLETED | OUTPATIENT
Start: 2024-12-29 | End: 2024-12-29

## 2024-12-29 RX ORDER — ONDANSETRON 2 MG/ML
4 INJECTION INTRAMUSCULAR; INTRAVENOUS ONCE
Status: COMPLETED | OUTPATIENT
Start: 2024-12-29 | End: 2024-12-29

## 2024-12-29 RX ORDER — KETOROLAC TROMETHAMINE 30 MG/ML
15 INJECTION, SOLUTION INTRAMUSCULAR; INTRAVENOUS ONCE
Status: COMPLETED | OUTPATIENT
Start: 2024-12-29 | End: 2024-12-29

## 2024-12-29 RX ADMIN — ONDANSETRON 4 MG: 2 INJECTION INTRAMUSCULAR; INTRAVENOUS at 21:15

## 2024-12-29 RX ADMIN — KETOROLAC TROMETHAMINE 15 MG: 30 INJECTION, SOLUTION INTRAMUSCULAR; INTRAVENOUS at 21:15

## 2024-12-29 RX ADMIN — LORAZEPAM 0.5 MG: 2 INJECTION INTRAMUSCULAR; INTRAVENOUS at 21:15

## 2024-12-29 RX ADMIN — SODIUM CHLORIDE 1000 ML: 0.9 INJECTION, SOLUTION INTRAVENOUS at 21:17

## 2024-12-30 ENCOUNTER — HOSPITAL ENCOUNTER (OUTPATIENT)
Dept: BONE DENSITY | Facility: MEDICAL CENTER | Age: 59
Discharge: HOME | End: 2024-12-30
Payer: COMMERCIAL

## 2024-12-30 ENCOUNTER — TELEPHONE (OUTPATIENT)
Age: 59
End: 2024-12-30

## 2024-12-30 DIAGNOSIS — Z78.0 POSTMENOPAUSAL: ICD-10-CM

## 2024-12-30 DIAGNOSIS — R20.0 LEFT ARM NUMBNESS: ICD-10-CM

## 2024-12-30 DIAGNOSIS — R00.2 PALPITATIONS: ICD-10-CM

## 2024-12-30 DIAGNOSIS — R07.9 CHEST PAIN, UNSPECIFIED TYPE: Primary | ICD-10-CM

## 2024-12-30 PROCEDURE — 77080 DXA BONE DENSITY AXIAL: CPT
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
## 2024-12-30 NOTE — ED PROVIDER NOTES
Time reflects when diagnosis was documented in both MDM as applicable and the Disposition within this note       Time User Action Codes Description Comment    12/29/2024 10:17 PM James Gabriel [F41.9] Anxiety     12/29/2024 10:17 PM James Gabriel [R40.4] Transient alteration of awareness           ED Disposition       ED Disposition   Discharge    Condition   Stable    Date/Time   Sun Dec 29, 2024 10:17 PM    Comment   Kristi Hunt discharge to home/self care.                   Assessment & Plan       Medical Decision Making  59-year-old female with history of intellectual disability, nonepileptic behavioral events presents to the ED for evaluation of on and off seizure-like activity.  History is largely gathered from EMS as patient answers minimal questions and numbers in chart for family are disconnected.  Patient apparently had an episode of seizure-like activity and 911 was called.  The fire department arrived first and noted termination of the seizure activity spontaneously and did not note a postictal period.  Patient had another episode which again terminated after EMS arrived and did not have a postictal period.  Patient did not receive any medications from EMS.  Patient apparently has had some recent life stressors.  Review of systems is limited, but patient does complain of chest pain and nausea.    Patient hypertensive with otherwise normal vital signs and presentation.  Patient is alert and oriented to person but does not complete all orientation questions.  Appears well but is overtly anxious.  PERRLA, EOMI.  Mucous membranes are moist.  Heart sounds are normal with regular rate and rhythm.  Lungs clear to auscultation.  Abdomen is benign.  Distal pulses are equal and intact.  There is no lower extremity edema.  Patient does follow commands and moves all extremities.    Concern for nonepileptiform behavioral episodes, anxiety, electrolyte abnormality.  I will get BMP, CBC,  screening ECG, treat symptomatically.    Amount and/or Complexity of Data Reviewed  Labs: ordered.    Risk  Prescription drug management.        ED Course as of 12/29/24 2218   Sun Dec 29, 2024   2214 Reassessed patient.  She is feeling much better.  I discussed the reassuring workup and plan for primary care follow-up. Patient voiced understanding of the plan and all questions were answered. Strict return precautions given. Patient is hemodynamically stable and safe for discharge at this time.       Medications   sodium chloride 0.9 % bolus 1,000 mL (1,000 mL Intravenous New Bag 12/29/24 2117)   ondansetron (ZOFRAN) injection 4 mg (4 mg Intravenous Given 12/29/24 2115)   ketorolac (TORADOL) injection 15 mg (15 mg Intravenous Given 12/29/24 2115)   LORazepam (ATIVAN) injection 0.5 mg (0.5 mg Intravenous Given 12/29/24 2115)       ED Risk Strat Scores                          SBIRT 22yo+      Flowsheet Row Most Recent Value   Initial Alcohol Screen: US AUDIT-C     1. How often do you have a drink containing alcohol? 0 Filed at: 12/29/2024 2102   2. How many drinks containing alcohol do you have on a typical day you are drinking?  0 Filed at: 12/29/2024 2102   3b. FEMALE Any Age, or MALE 65+: How often do you have 4 or more drinks on one occassion? 0 Filed at: 12/29/2024 2102   Audit-C Score 0 Filed at: 12/29/2024 2102   MATEUS: How many times in the past year have you...    Used an illegal drug or used a prescription medication for non-medical reasons? Never Filed at: 12/29/2024 2102                            History of Present Illness       Chief Complaint   Patient presents with    Seizure - Prior Hx Of     Started having chest pain about a week ago, seizures on and off starting 12/24 with no post ictal phase.       Past Medical History:   Diagnosis Date    Acute cystitis with hematuria     Last assessed: 3/1/16    Carpal tunnel syndrome of left wrist 12/2017    Confusion and disorientation 12/27/2017     Endometriosis     Kidney stone     Learning disabilities     Migraine headache     Raynaud's phenomenon     Reactive airway disease     Syncope 2014    Last assessed: 10/10/13      Past Surgical History:   Procedure Laterality Date    COLONOSCOPY W/ BIOPSIES  07/21/2020    5 YEAR RECOMMENDED = TUBULAR ADENOMA    EXPLORATORY LAPAROTOMY      HYSTERECTOMY  08/2014    Abdominal, supracervical    OOPHORECTOMY        Family History   Adopted: Yes      Social History     Tobacco Use    Smoking status: Never    Smokeless tobacco: Never   Vaping Use    Vaping status: Never Used   Substance Use Topics    Alcohol use: Not Currently    Drug use: No      E-Cigarette/Vaping    E-Cigarette Use Never User       E-Cigarette/Vaping Substances    Nicotine No     THC No     CBD No     Flavoring No     Other No     Unknown No       I have reviewed and agree with the history as documented.     HPI    Review of Systems        Objective       ED Triage Vitals   Temperature Pulse Blood Pressure Respirations SpO2 Patient Position - Orthostatic VS   12/29/24 2057 12/29/24 2042 12/29/24 2042 12/29/24 2042 12/29/24 2042 12/29/24 2042   97.9 °F (36.6 °C) 75 170/90 16 99 % Lying      Temp Source Heart Rate Source BP Location FiO2 (%) Pain Score    12/29/24 2057 12/29/24 2042 12/29/24 2042 -- --    Oral Monitor Left arm        Vitals      Date and Time Temp Pulse SpO2 Resp BP Pain Score FACES Pain Rating User   12/29/24 2057 97.9 °F (36.6 °C) -- -- -- -- -- -- AS   12/29/24 2042 -- 75 99 % 16 170/90 -- -- AS            Physical Exam    Results Reviewed       Procedure Component Value Units Date/Time    Basic metabolic panel [[id number]] Collected: 12/29/24 2115    Lab Status: Final result Specimen: Blood from Arm, Left Updated: 12/29/24 2153     Sodium 139 mmol/L      Potassium 3.7 mmol/L      Chloride 104 mmol/L      CO2 28 mmol/L      ANION GAP 7 mmol/L      BUN 13 mg/dL      Creatinine 0.90 mg/dL      Glucose 126 mg/dL      Calcium 9.6 mg/dL       eGFR 70 ml/min/1.73sq m     Narrative:      National Kidney Disease Foundation guidelines for Chronic Kidney Disease (CKD):     Stage 1 with normal or high GFR (GFR > 90 mL/min/1.73 square meters)    Stage 2 Mild CKD (GFR = 60-89 mL/min/1.73 square meters)    Stage 3A Moderate CKD (GFR = 45-59 mL/min/1.73 square meters)    Stage 3B Moderate CKD (GFR = 30-44 mL/min/1.73 square meters)    Stage 4 Severe CKD (GFR = 15-29 mL/min/1.73 square meters)    Stage 5 End Stage CKD (GFR <15 mL/min/1.73 square meters)  Note: GFR calculation is accurate only with a steady state creatinine    CBC and differential [[id number]] Collected: 12/29/24 2115    Lab Status: Final result Specimen: Blood from Arm, Left Updated: 12/29/24 2133     WBC 5.49 Thousand/uL      RBC 4.21 Million/uL      Hemoglobin 13.2 g/dL      Hematocrit 39.4 %      MCV 94 fL      MCH 31.4 pg      MCHC 33.5 g/dL      RDW 13.3 %      MPV 11.3 fL      Platelets 172 Thousands/uL      nRBC 0 /100 WBCs      Segmented % 60 %      Immature Grans % 0 %      Lymphocytes % 32 %      Monocytes % 7 %      Eosinophils Relative 1 %      Basophils Relative 0 %      Absolute Neutrophils 3.25 Thousands/µL      Absolute Immature Grans 0.01 Thousand/uL      Absolute Lymphocytes 1.78 Thousands/µL      Absolute Monocytes 0.37 Thousand/µL      Eosinophils Absolute 0.07 Thousand/µL      Basophils Absolute 0.01 Thousands/µL             No orders to display       ECG 12 Lead Documentation Only    Date/Time: 12/29/2024 9:17 PM    Performed by: James Gabriel DO  Authorized by: James Gabriel DO    Patient location:  ED  Previous ECG:     Previous ECG:  Compared to current    Similarity:  No change  Interpretation:     Interpretation: normal    Rate:     ECG rate:  70    ECG rate assessment: normal    Rhythm:     Rhythm: sinus rhythm    Ectopy:     Ectopy: none    QRS:     QRS axis:  Normal    QRS intervals:  Normal  Conduction:     Conduction: normal    ST segments:     ST segments:   Normal  T waves:     T waves: normal        ED Medication and Procedure Management   Prior to Admission Medications   Prescriptions Last Dose Informant Patient Reported? Taking?   Cranberry 125 MG TABS   Yes No   Sig: Take by mouth Uknown mg   Lecithin 400 MG CAPS   Yes No   Sig: Take by mouth 2 (two) times a day Uknown mg   estradiol (ESTRACE) 1 mg tablet   No No   Sig: Take 1 tablet (1 mg total) by mouth daily   magnesium oxide (MAG-OX) 400 mg  Self No No   Sig: Take 1 tablet by mouth daily   multivitamin (THERAGRAN) TABS   Yes No   Sig: Take 1 tablet by mouth daily   omega-3-acid ethyl esters (LOVAZA) 1 g capsule   Yes No   Sig: Take by mouth 2 (two) times a day FISH OIL      Facility-Administered Medications: None     Patient's Medications   Discharge Prescriptions    No medications on file     No discharge procedures on file.  ED SEPSIS DOCUMENTATION   Time reflects when diagnosis was documented in both MDM as applicable and the Disposition within this note       Time User Action Codes Description Comment    12/29/2024 10:17 PM James Gabriel [F41.9] Anxiety     12/29/2024 10:17 PM James Gabriel [R40.4] Transient alteration of awareness                  James Gabriel, DO  12/29/24 [id number]

## 2024-12-30 NOTE — ED ATTENDING ATTESTATION
12/29/2024  I, Cody Staples MD, saw and evaluated the patient. I have discussed the patient with the resident/non-physician practitioner and agree with the resident's/non-physician practitioner's findings, Plan of Care, and MDM as documented in the resident's/non-physician practitioner's note, except where noted. All available labs and Radiology studies were reviewed.  I was present for key portions of any procedure(s) performed by the resident/non-physician practitioner and I was immediately available to provide assistance.       At this point I agree with the current assessment done in the Emergency Department.  I have conducted an independent evaluation of this patient a history and physical is as follows:  H/o PNES   seizure like activity  while at home    Family called EMS    No medications where admit   bs was 116   No injury   no fever     No incontinence  Vss     Patient is awake alert and oriented x 3 HEENT exam is unremarkable  Normocephalic atraumatic pupils equal round reactive to light no tongue biting neck is nontender no JVD lungs clear heart regular no murmurs gallops or rubs abdomen soft and nontender extremities normal  Neurological cranial nerves II 12 intact motor 5-5 sensory grossly intact cerebellar testing normal  Impression PNES    I do not believe the patient had a true seizure  I have reviewed her records she had an MRI of her brain recently which was unremarkable  Patient had video EEG monitoring neurologic consultation while she was admitted late summer for similar episodes      Impression  ED Course   Prehospital EKG normal sinus rhythm normal EKG normal intervals  No indication for advanced imaging of the brain at the present time patient will be given small dose of Ativan we will check some basic labs    Critical Care Time  Procedures

## 2024-12-30 NOTE — TELEPHONE ENCOUNTER
"Mom called because patient had a seizure on Friday 12/27 and had another one last night. Mom is concerned and would like to know what she should do if she has another one. She wants to know if she should call an ambulance each time. States that she and patient are very anxious about it. The both took \"Calms forte\", a supplement that is in the [employer] catalog of vitamins. Mom would also like Sophy to know that patient received toradol, ativan and zofran in the ED last night. Mom asking for a return call asap for instruction on what to do if she has more seizures.   "

## 2024-12-31 LAB
ATRIAL RATE: 70 BPM
P AXIS: 59 DEGREES
PR INTERVAL: 144 MS
QRS AXIS: 60 DEGREES
QRSD INTERVAL: 76 MS
QT INTERVAL: 390 MS
QTC INTERVAL: 421 MS
T WAVE AXIS: 48 DEGREES
VENTRICULAR RATE: 70 BPM

## 2024-12-31 PROCEDURE — 93010 ELECTROCARDIOGRAM REPORT: CPT | Performed by: INTERNAL MEDICINE

## 2025-01-02 ENCOUNTER — RA CDI HCC (OUTPATIENT)
Dept: OTHER | Facility: HOSPITAL | Age: 60
End: 2025-01-02

## 2025-01-02 NOTE — PROGRESS NOTES
HCC coding opportunities       Chart reviewed, no opportunity found: CHART REVIEWED, NO OPPORTUNITY FOUND      This is a reminder to address (resolve/update/assess) ALL HCC (risk adjustment) codes as found on active problem list for 2025 as patient scores reset to zero CHONG.  Patients Insurance     Medicare Insurance: [employer]

## 2025-01-02 NOTE — TELEPHONE ENCOUNTER
Patient is very overdue for neurology. They suspected this was behavioral. Recommend she contact neurology

## 2025-01-03 NOTE — TELEPHONE ENCOUNTER
Patient's mother notified of Sophy's recommendation. Neurology number given for her to call. Advised to keep appt next week to review everything.

## 2025-01-06 ENCOUNTER — RESULTS FOLLOW-UP (OUTPATIENT)
Dept: FAMILY MEDICINE CLINIC | Facility: CLINIC | Age: 60
End: 2025-01-06

## 2025-01-07 ENCOUNTER — HOSPITAL ENCOUNTER (OUTPATIENT)
Dept: NON INVASIVE DIAGNOSTICS | Facility: HOSPITAL | Age: 60
Discharge: HOME | End: 2025-01-07
Payer: COMMERCIAL

## 2025-01-07 ENCOUNTER — HOSPITAL ENCOUNTER (EMERGENCY)
Facility: HOSPITAL | Age: 60
Discharge: HOME | End: 2025-01-07
Attending: EMERGENCY MEDICINE
Payer: COMMERCIAL

## 2025-01-07 ENCOUNTER — APPOINTMENT (EMERGENCY)
Dept: RADIOLOGY | Facility: HOSPITAL | Age: 60
End: 2025-01-07
Payer: COMMERCIAL

## 2025-01-07 VITALS
OXYGEN SATURATION: 98 % | HEART RATE: 64 BPM | SYSTOLIC BLOOD PRESSURE: 155 MMHG | DIASTOLIC BLOOD PRESSURE: 68 MMHG | RESPIRATION RATE: 12 BRPM

## 2025-01-07 DIAGNOSIS — R07.9 CHEST PAIN, UNSPECIFIED TYPE: ICD-10-CM

## 2025-01-07 DIAGNOSIS — R56.9 SEIZURE-LIKE ACTIVITY (HCC): Primary | ICD-10-CM

## 2025-01-07 DIAGNOSIS — R00.2 PALPITATIONS: ICD-10-CM

## 2025-01-07 DIAGNOSIS — R07.9 CHEST PAIN: ICD-10-CM

## 2025-01-07 DIAGNOSIS — R20.0 LEFT ARM NUMBNESS: ICD-10-CM

## 2025-01-07 LAB
ALBUMIN SERPL BCG-MCNC: 4.4 G/DL (ref 3.5–5)
ALP SERPL-CCNC: 63 U/L (ref 34–104)
ALT SERPL W P-5'-P-CCNC: 16 U/L (ref 7–52)
ANION GAP SERPL CALCULATED.3IONS-SCNC: 6 MMOL/L (ref 4–13)
AORTIC ROOT: 2.7 CM
AST SERPL W P-5'-P-CCNC: 16 U/L (ref 13–39)
BASOPHILS # BLD AUTO: 0.02 THOUSANDS/ΜL (ref 0–0.1)
BASOPHILS NFR BLD AUTO: 0 % (ref 0–1)
BILIRUB SERPL-MCNC: 0.41 MG/DL (ref 0.2–1)
BUN SERPL-MCNC: 11 MG/DL (ref 5–25)
CALCIUM SERPL-MCNC: 9.3 MG/DL (ref 8.4–10.2)
CARDIAC TROPONIN I PNL SERPL HS: <2 NG/L (ref ?–50)
CARDIAC TROPONIN I PNL SERPL HS: <2 NG/L (ref ?–50)
CHLORIDE SERPL-SCNC: 104 MMOL/L (ref 96–108)
CO2 SERPL-SCNC: 28 MMOL/L (ref 21–32)
CREAT SERPL-MCNC: 0.81 MG/DL (ref 0.6–1.3)
E WAVE DECELERATION TIME: 104 MS
E/A RATIO: 1.27
EOSINOPHIL # BLD AUTO: 0.05 THOUSAND/ΜL (ref 0–0.61)
EOSINOPHIL NFR BLD AUTO: 1 % (ref 0–6)
ERYTHROCYTE [DISTWIDTH] IN BLOOD BY AUTOMATED COUNT: 13.4 % (ref 11.6–15.1)
GFR SERPL CREATININE-BSD FRML MDRD: 80 ML/MIN/1.73SQ M
GLUCOSE SERPL-MCNC: 90 MG/DL (ref 65–140)
HCT VFR BLD AUTO: 40.7 % (ref 34.8–46.1)
HGB BLD-MCNC: 13.3 G/DL (ref 11.5–15.4)
IMM GRANULOCYTES # BLD AUTO: 0.01 THOUSAND/UL (ref 0–0.2)
IMM GRANULOCYTES NFR BLD AUTO: 0 % (ref 0–2)
LYMPHOCYTES # BLD AUTO: 1.64 THOUSANDS/ΜL (ref 0.6–4.47)
LYMPHOCYTES NFR BLD AUTO: 32 % (ref 14–44)
MAX HR PERCENT: 69 %
MAX HR: 112 BPM
MCH RBC QN AUTO: 31.6 PG (ref 26.8–34.3)
MCHC RBC AUTO-ENTMCNC: 32.7 G/DL (ref 31.4–37.4)
MCV RBC AUTO: 97 FL (ref 82–98)
MONOCYTES # BLD AUTO: 0.37 THOUSAND/ΜL (ref 0.17–1.22)
MONOCYTES NFR BLD AUTO: 7 % (ref 4–12)
MV E'TISSUE VEL-SEP: 10 CM/S
MV PEAK A VEL: 0.86 M/S
MV PEAK E VEL: 109 CM/S
MV STENOSIS PRESSURE HALF TIME: 30 MS
MV VALVE AREA P 1/2 METHOD: 7.33
NEUTROPHILS # BLD AUTO: 2.99 THOUSANDS/ΜL (ref 1.85–7.62)
NEUTS SEG NFR BLD AUTO: 60 % (ref 43–75)
NRBC BLD AUTO-RTO: 0 /100 WBCS
PLATELET # BLD AUTO: 189 THOUSANDS/UL (ref 149–390)
PMV BLD AUTO: 10.9 FL (ref 8.9–12.7)
POTASSIUM SERPL-SCNC: 4.6 MMOL/L (ref 3.5–5.3)
PROT SERPL-MCNC: 7.6 G/DL (ref 6.4–8.4)
RATE PRESSURE PRODUCT: NORMAL
RBC # BLD AUTO: 4.21 MILLION/UL (ref 3.81–5.12)
SL CV LV EF: 55
SL CV STRESS RECOVERY BP: NORMAL MMHG
SL CV STRESS RECOVERY HR: 69 BPM
SODIUM SERPL-SCNC: 138 MMOL/L (ref 135–147)
STRESS BASELINE BP: NORMAL MMHG
STRESS BASELINE HR: 62 BPM
STRESS O2 SAT REST: 100 %
STRESS PEAK HR: 112 BPM
STRESS POST ESTIMATED WORKLOAD: 1.9 METS
STRESS POST EXERCISE DUR MIN: 2 MIN
STRESS POST EXERCISE DUR SEC: 50 SEC
STRESS POST O2 SAT PEAK: 100 %
STRESS POST PEAK BP: 160 MMHG
TR MAX PG: 17 MMHG
TR PEAK VELOCITY: 2.1 M/S
TRICUSPID VALVE PEAK REGURGITATION VELOCITY: 2.07 M/S
WBC # BLD AUTO: 5.08 THOUSAND/UL (ref 4.31–10.16)

## 2025-01-07 PROCEDURE — 36415 COLL VENOUS BLD VENIPUNCTURE: CPT

## 2025-01-07 PROCEDURE — 84484 ASSAY OF TROPONIN QUANT: CPT

## 2025-01-07 PROCEDURE — 71045 X-RAY EXAM CHEST 1 VIEW: CPT

## 2025-01-07 PROCEDURE — 93350 STRESS TTE ONLY: CPT

## 2025-01-07 PROCEDURE — 93350 STRESS TTE ONLY: CPT | Performed by: INTERNAL MEDICINE

## 2025-01-07 PROCEDURE — 93005 ELECTROCARDIOGRAM TRACING: CPT

## 2025-01-07 PROCEDURE — 80053 COMPREHEN METABOLIC PANEL: CPT

## 2025-01-07 PROCEDURE — 99285 EMERGENCY DEPT VISIT HI MDM: CPT

## 2025-01-07 PROCEDURE — 85025 COMPLETE CBC W/AUTO DIFF WBC: CPT

## 2025-01-07 PROCEDURE — 99285 EMERGENCY DEPT VISIT HI MDM: CPT | Performed by: EMERGENCY MEDICINE

## 2025-01-07 NOTE — ED PROVIDER NOTES
"Time reflects when diagnosis was documented in both MDM as applicable and the Disposition within this note       Time User Action Codes Description Comment    1/7/2025 12:30 PM Flores Resendez Add [R56.9] Seizure-like activity (HCC)     1/7/2025  3:20 PM Flores Resendez Add [R07.9] Chest pain           ED Disposition       ED Disposition   Discharge    Condition   Stable    Date/Time   Tue Jan 7, 2025  3:20 PM    Comment   Kristi Hunt discharge to home/self care.                   Assessment & Plan       Medical Decision Making  Amount and/or Complexity of Data Reviewed  Labs: ordered. Decision-making details documented in ED Course.  Radiology: ordered and independent interpretation performed.        Patient is a 59 y.o. female  PMH non-epileptic behavioral events with seizure-like actiivty, learning disability who presents to the ED with CC chest pain while doing a stress test today in the hospital when a medical emergency was called. States she has a long standing history of chest pain that progresses into \"seizure like activity\". Today during the stress test she started getting stabbing chest pain. The staff sat her down. Friend stats this then progressed to \"seizure like symptoms\" where here body will shake uncontrollably and it will seem like she's having a hard time breathing, but she is alert and remembers the event. She immediately returned to baseline. Unclear how long the event lasted - friends states it can last a couple of minutes usually and was \"shorter this time\" but is unsure how long. Has been given a referral to neurology but has not seen them. She has a history of seizure like activity and was hospitalized for this 8/7/24 with vEEG performed and nosiezure or epileptiform discharged. 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  Plan at that time was outpatient neurology follow up with 72 hour ambulatory EEG. Patient " was then seen 8/8/24 and again 12/29/24 for similar episodes. Patient is reporting stabbing chest pain righ tnow     Vital signs significant for HTN. Exam as listed below.    Differential diagnosis includes but is not limited to - most likely PNES however unclear if patient actually had seizure like activity while on vEEG to definitively rule out epilepsy; angina, anxiety; do not suspected dissection or PE.      Plan cbc, cmp, EKG, CXR, neurology consult in the ED to establish care plan weather it's outpatient EEG vs repeat inpatient EEG.     View ED course above for further discussion on patient workup.     On review of previous records - see chart review above; additionally patient had CT head done 8/3/24 with no abnormality. MRI brain 7/30/24 with no abnormality.     All labs reviewed and utilized in the medical decision making process  All radiology studies independently viewed by me and interpreted by the radiologist.  I reviewed all testing with the patient.     Upon re-evaluation discussed over epic chat with neuro who confirmed appropriate for outpatient follow up - agree. Patient stable. Discussed again necessity to establish with neurology outpatient for continued care.    ED Course as of 01/08/25 2359   Tue Jan 07, 2025   1239 Reached out to neurology who state correct evaluation was done during last admission with vEEG. They are planning to arrange expedited outpatient follow up,    1240 Procedure Note: EKG  Date/Time: 01/07/25 12:42 PM   Interpreted by: TYRESE CHAVEZ  Indications / Diagnosis: chest pain  ECG reviewed by me, the ED Provider: yes   The EKG demonstrates: normal EKG, normal sinus rhythm, unchanged from previous tracings, incomplete RBBB  Rhythm: normal sinus  Intervals: normal intervals  Axis: normal axis  QRS/Blocks: normal QRS  ST Changes: No acute ST Changes, no STD/MENA.     1243 CBC and differential  WNL   1311 hs TnI 0hr: <2  Will delta as chest pain starting immediately prior to  arrival in the ED    1356 Comprehensive metabolic panel  All WNL    1428 Blood Pressure: 155/68   1520 hs TnI 4hr: <2       Medications - No data to display    ED Risk Strat Scores   HEART Risk Score      Flowsheet Row Most Recent Value   Heart Score Risk Calculator    History 1 Filed at: 01/07/2025 1427   ECG 0 Filed at: 01/07/2025 1427   Age 1 Filed at: 01/07/2025 1427   Risk Factors 0 Filed at: 01/07/2025 1427   Troponin 0 Filed at: 01/07/2025 1427   HEART Score 2 Filed at: 01/07/2025 1427          HEART Risk Score      Flowsheet Row Most Recent Value   Heart Score Risk Calculator    History 1 Filed at: 01/07/2025 1427   ECG 0 Filed at: 01/07/2025 1427   Age 1 Filed at: 01/07/2025 1427   Risk Factors 0 Filed at: 01/07/2025 1427   Troponin 0 Filed at: 01/07/2025 1427   HEART Score 2 Filed at: 01/07/2025 1427                                                History of Present Illness       Chief Complaint   Patient presents with    Chest Pain     Patient was getting a stress test done when she developed substernal, non-radiating chest pain, brought to ED for eval as a medical emergency       Past Medical History:   Diagnosis Date    Acute cystitis with hematuria     Last assessed: 3/1/16    Carpal tunnel syndrome of left wrist 12/2017    Confusion and disorientation 12/27/2017    Endometriosis     Kidney stone     Learning disabilities     Migraine headache     Raynaud's phenomenon     Reactive airway disease     Syncope 2014    Last assessed: 10/10/13      Past Surgical History:   Procedure Laterality Date    COLONOSCOPY W/ BIOPSIES  07/21/2020    5 YEAR RECOMMENDED = TUBULAR ADENOMA    EXPLORATORY LAPAROTOMY      HYSTERECTOMY  08/2014    Abdominal, supracervical    OOPHORECTOMY        Family History   Adopted: Yes      Social History     Tobacco Use    Smoking status: Never    Smokeless tobacco: Never   Vaping Use    Vaping status: Never Used   Substance Use Topics    Alcohol use: Not Currently    Drug use: No       E-Cigarette/Vaping    E-Cigarette Use Never User       E-Cigarette/Vaping Substances    Nicotine No     THC No     CBD No     Flavoring No     Other No     Unknown No       I have reviewed and agree with the history as documented.     59 y.o. female with CC chest pain during exercise stress test that then progressed to seizure-like activity         Review of Systems   Constitutional:  Negative for activity change, appetite change, chills, fatigue and fever.   Respiratory:  Positive for chest tightness. Negative for apnea, shortness of breath, wheezing and stridor.    Cardiovascular:  Positive for chest pain. Negative for palpitations.   Gastrointestinal:  Negative for abdominal distention, nausea and vomiting.   Skin:  Negative for color change.   Neurological:  Positive for seizures. Negative for dizziness and weakness.           Objective       ED Triage Vitals [01/07/25 1155]   Temp Pulse Blood Pressure Respirations SpO2 Patient Position - Orthostatic VS   -- 68 (!) 189/101 16 98 % --      Temp Source Heart Rate Source BP Location FiO2 (%) Pain Score    Oral Monitor -- -- 5      Vitals      Date and Time Temp Pulse SpO2 Resp BP Pain Score FACES Pain Rating User   01/07/25 1400 -- 64 98 % 12 155/68 -- -- NR   01/07/25 1217 -- -- -- -- 171/91 -- -- NV   01/07/25 1155 -- 68 98 % 16 189/101 5 -- NR            Physical Exam  Constitutional:       General: She is not in acute distress.     Appearance: Normal appearance. She is not ill-appearing, toxic-appearing or diaphoretic.   HENT:      Head: Normocephalic and atraumatic.      Nose: Nose normal.      Mouth/Throat:      Mouth: Mucous membranes are moist.   Eyes:      Pupils: Pupils are equal, round, and reactive to light.   Cardiovascular:      Rate and Rhythm: Normal rate and regular rhythm.      Pulses: Normal pulses.      Heart sounds: Normal heart sounds.   Pulmonary:      Effort: Pulmonary effort is normal. No respiratory distress.      Breath sounds: No  wheezing or rales.   Abdominal:      General: Abdomen is flat.   Skin:     General: Skin is warm and dry.      Capillary Refill: Capillary refill takes less than 2 seconds.   Neurological:      General: No focal deficit present.      Mental Status: She is alert and oriented to person, place, and time.         Results Reviewed       Procedure Component Value Units Date/Time    HS Troponin I 4hr [[id number]] Collected: 01/07/25 1444    Lab Status: Final result Specimen: Blood from Arm, Right Updated: 01/07/25 1517     hs TnI 4hr <2 ng/L      Delta 4hr hsTnI --    Comprehensive metabolic panel [[id number]] Collected: 01/07/25 1226    Lab Status: Final result Specimen: Blood from Arm, Left Updated: 01/07/25 1346     Sodium 138 mmol/L      Potassium 4.6 mmol/L      Chloride 104 mmol/L      CO2 28 mmol/L      ANION GAP 6 mmol/L      BUN 11 mg/dL      Creatinine 0.81 mg/dL      Glucose 90 mg/dL      Calcium 9.3 mg/dL      AST 16 U/L      ALT 16 U/L      Alkaline Phosphatase 63 U/L      Total Protein 7.6 g/dL      Albumin 4.4 g/dL      Total Bilirubin 0.41 mg/dL      eGFR 80 ml/min/1.73sq m     Narrative:      National Kidney Disease Foundation guidelines for Chronic Kidney Disease (CKD):     Stage 1 with normal or high GFR (GFR > 90 mL/min/1.73 square meters)    Stage 2 Mild CKD (GFR = 60-89 mL/min/1.73 square meters)    Stage 3A Moderate CKD (GFR = 45-59 mL/min/1.73 square meters)    Stage 3B Moderate CKD (GFR = 30-44 mL/min/1.73 square meters)    Stage 4 Severe CKD (GFR = 15-29 mL/min/1.73 square meters)    Stage 5 End Stage CKD (GFR <15 mL/min/1.73 square meters)  Note: GFR calculation is accurate only with a steady state creatinine    HS Troponin 0hr (reflex protocol) [[id number]]  (Normal) Collected: 01/07/25 1226    Lab Status: Final result Specimen: Blood from Arm, Left Updated: 01/07/25 1303     hs TnI 0hr <2 ng/L     CBC and differential [[id number]] Collected: 01/07/25 1226    Lab Status: Final result Specimen: Blood  from Arm, Left Updated: 01/07/25 1235     WBC 5.08 Thousand/uL      RBC 4.21 Million/uL      Hemoglobin 13.3 g/dL      Hematocrit 40.7 %      MCV 97 fL      MCH 31.6 pg      MCHC 32.7 g/dL      RDW 13.4 %      MPV 10.9 fL      Platelets 189 Thousands/uL      nRBC 0 /100 WBCs      Segmented % 60 %      Immature Grans % 0 %      Lymphocytes % 32 %      Monocytes % 7 %      Eosinophils Relative 1 %      Basophils Relative 0 %      Absolute Neutrophils 2.99 Thousands/µL      Absolute Immature Grans 0.01 Thousand/uL      Absolute Lymphocytes 1.64 Thousands/µL      Absolute Monocytes 0.37 Thousand/µL      Eosinophils Absolute 0.05 Thousand/µL      Basophils Absolute 0.02 Thousands/µL             XR chest 1 view portable   ED Interpretation by Flores Resendez MD (01/07 1324)   Normal CXR unchanged from prior       Final Interpretation by Prudencio Marie MD (01/07 1327)      No acute cardiopulmonary disease.            Workstation performed: [id number]             Procedures    ED Medication and Procedure Management   Prior to Admission Medications   Prescriptions Last Dose Informant Patient Reported? Taking?   Cranberry 125 MG TABS   Yes No   Sig: Take by mouth Uknown mg   Lecithin 400 MG CAPS   Yes No   Sig: Take by mouth 2 (two) times a day Uknown mg   estradiol (ESTRACE) 1 mg tablet   No No   Sig: Take 1 tablet (1 mg total) by mouth daily   magnesium oxide (MAG-OX) 400 mg  Self No No   Sig: Take 1 tablet by mouth daily   multivitamin (THERAGRAN) TABS   Yes No   Sig: Take 1 tablet by mouth daily   omega-3-acid ethyl esters (LOVAZA) 1 g capsule   Yes No   Sig: Take by mouth 2 (two) times a day FISH OIL      Facility-Administered Medications: None     Discharge Medication List as of 1/7/2025  3:23 PM        CONTINUE these medications which have NOT CHANGED    Details   Cranberry 125 MG TABS Take by mouth Uknown mg, Historical Med      estradiol (ESTRACE) 1 mg tablet Take 1 tablet (1 mg total) by mouth daily, Starting Mon  10/14/2024, Normal      Lecithin 400 MG CAPS Take by mouth 2 (two) times a day Uknown mg, Historical Med      magnesium oxide (MAG-OX) 400 mg Take 1 tablet by mouth daily, Starting Wed 12/27/2017, Print      multivitamin (THERAGRAN) TABS Take 1 tablet by mouth daily, Historical Med      omega-3-acid ethyl esters (LOVAZA) 1 g capsule Take by mouth 2 (two) times a day FISH OIL, Historical Med             ED SEPSIS DOCUMENTATION   Time reflects when diagnosis was documented in both MDM as applicable and the Disposition within this note       Time User Action Codes Description Comment    1/7/2025 12:30 PM Flores Resendez [R56.9] Seizure-like activity (HCC)     1/7/2025  3:20 PM Flores Resendez [R07.9] Chest pain                  Flores Resendez MD  01/08/25 0050

## 2025-01-07 NOTE — DISCHARGE INSTRUCTIONS
You were seen today for chest pain followed by seizure-like activity. Your cardiac evaluation in the ER was normal. Regarding the seizure-like activity -  I reached out to neurology about this who stated that outpatient follow up is appropriate. Please call neurology for follow up. I have also given a psychiatry/behavioral health referral. As we discussed, if this is psychogenic non-epileptic seizure activity, trauma and emotional distress are common underlying causes.  Return to the ER with any new or worsening symptoms.

## 2025-01-07 NOTE — PROGRESS NOTES
Responded to Medical emergency. Family present at this time and medical team with patient.  remains available as needed.   01/07/25 1200   Clinical Encounter Type   Visited With Patient not available;Health care provider   Crisis Visit   (Medical Emergency)   Referral From Nurse   Referral To

## 2025-01-07 NOTE — ED NOTES
"Patient having brief episodes of full body muscle contraction and heavy rapid breathing lasting 5-10 seconds, eyes closed but patient withdraws from painful stimuli, patient appears to be responding to stimuli around her during these episodes but will not vocalize during these events. Visitor states \"this happens when she has pain, she has these episodes, they have never been able to figure it out\". Chart review indicates history of non-epileptic behavioral events     Deanna Menjivar RN  01/07/25 1159       Deanna Menjivar RN  01/07/25 1203    "

## 2025-01-07 NOTE — ED ATTENDING ATTESTATION
1/7/2025  I, Lian Horn MD, saw and evaluated the patient. I have discussed the patient with the resident/non-physician practitioner and agree with the resident's/non-physician practitioner's findings, Plan of Care, and MDM as documented in the resident's/non-physician practitioner's note, except where noted. All available labs and Radiology studies were reviewed.  I was present for key portions of any procedure(s) performed by the resident/non-physician practitioner and I was immediately available to provide assistance.       At this point I agree with the current assessment done in the Emergency Department.  I have conducted an independent evaluation of this patient a history and physical is as follows:  59-year-old female having chest pain for about a year, intermittent sharp stabbing nonexertional, difficult for her to describe it.  Patient with sudden outpatient stress test and was unable to complete the test because she had seizure-like activity.  Patient did not lose consciousness.  She has had numerous episodes of this before and has been seen in the ED twice before.  She has been admitted to the hospital in the past and had a negative evaluation.  Patient complains of ongoing sharp chest pain.  She has not had fevers, chills, nausea, vomiting, or abdominal pain.  Review of systems otherwise negative in 12 systems reviewed.  On exam vital signs were reviewed.  Patient is awake, alert, interactive.  The patient's pupils are equally round reactive to light.  Oropharynx is clear with moist mucous membranes.  Neck is supple and nontender with no adenopathy or JVD.  Heart is regular with no murmurs, rubs, or gallops.  Lungs are clear and equal with no wheezes, rales, or rhonchi.  Abdomen is soft and nontender with no masses, rebound, or guarding. There is no CVA tenderness.  The patient was completely exposed.  There is no skin breakdown.  There are no rashes or skin changes.  Extremities are warm and  "well perfused with good pulses. The patient has normal strength, sensation, and cranial nerves. MEDICAL DECISION MAKING    Number and Complexity of Problems  Differential diagnosis: Atypical chest pain, doubt cardiac chest pain, abnormal movements, doubt seizure given prior workup    Medical Decision Making Data  External documents reviewed: Prior ED visits reviewed  My EKG interpretation: Sinus, no ischemia or ectopy  My CT interpretation:   My X-ray interpretation: Unremarkable  My ultrasound interpretation:     XR chest 1 view portable   ED Interpretation   Normal CXR unchanged from prior       Final Result      No acute cardiopulmonary disease.            Workstation performed: [id number]             Labs Reviewed   HS TROPONIN I 0HR - Normal       Result Value Ref Range Status    hs TnI 0hr <2  \"Refer to ACS Flowchart\"- see link ng/L Final    Comment:                                              Initial (time 0) result  If >=50 ng/L, Myocardial injury suggested ;  Type of myocardial injury and treatment strategy  to be determined.  If 5-49 ng/L, a delta result at 2 hours and or 4 hours will be needed to further evaluate.  If <4 ng/L, and chest pain has been >3 hours since onset, patient may qualify for discharge based on the HEART score in the ED.  If <5 ng/L and <3hours since onset of chest pain, a delta result at 2 hours will be needed to further evaluate.    HS Troponin 99th Percentile URL of a Health Population=12 ng/L with a 95% Confidence Interval of 8-18 ng/L.    Second Troponin (time 2 hours)  If calculated delta >= 20 ng/L,  Myocardial injury suggested ; Type of myocardial injury and treatment strategy to be determined.  If 5-49 ng/L and the calculated delta is 5-19 ng/L, consult medical service for evaluation.  Continue evaluation for ischemia on ecg and other possible etiology and repeat hs troponin at 4 hours.  If delta is <5 ng/L at 2 hours, consider discharge based on risk stratification via the " HEART score (if in ED), or MEDARDO risk score in IP/Observation.    HS Troponin 99th Percentile URL of a Health Population=12 ng/L with a 95% Confidence Interval of 8-18 ng/L.   CBC AND DIFFERENTIAL    WBC 5.08  4.31 - 10.16 Thousand/uL Final    RBC 4.21  3.81 - 5.12 Million/uL Final    Hemoglobin 13.3  11.5 - 15.4 g/dL Final    Hematocrit 40.7  34.8 - 46.1 % Final    MCV 97  82 - 98 fL Final    MCH 31.6  26.8 - 34.3 pg Final    MCHC 32.7  31.4 - 37.4 g/dL Final    RDW 13.4  11.6 - 15.1 % Final    MPV 10.9  8.9 - 12.7 fL Final    Platelets 189  149 - 390 Thousands/uL Final    nRBC 0  /100 WBCs Final    Segmented % 60  43 - 75 % Final    Immature Grans % 0  0 - 2 % Final    Lymphocytes % 32  14 - 44 % Final    Monocytes % 7  4 - 12 % Final    Eosinophils Relative 1  0 - 6 % Final    Basophils Relative 0  0 - 1 % Final    Absolute Neutrophils 2.99  1.85 - 7.62 Thousands/µL Final    Absolute Immature Grans 0.01  0.00 - 0.20 Thousand/uL Final    Absolute Lymphocytes 1.64  0.60 - 4.47 Thousands/µL Final    Absolute Monocytes 0.37  0.17 - 1.22 Thousand/µL Final    Eosinophils Absolute 0.05  0.00 - 0.61 Thousand/µL Final    Basophils Absolute 0.02  0.00 - 0.10 Thousands/µL Final   COMPREHENSIVE METABOLIC PANEL    Sodium 138  135 - 147 mmol/L Final    Potassium 4.6  3.5 - 5.3 mmol/L Final    Chloride 104  96 - 108 mmol/L Final    CO2 28  21 - 32 mmol/L Final    ANION GAP 6  4 - 13 mmol/L Final    BUN 11  5 - 25 mg/dL Final    Creatinine 0.81  0.60 - 1.30 mg/dL Final    Comment: Standardized to IDMS reference method    Glucose 90  65 - 140 mg/dL Final    Comment: If the patient is fasting, the ADA then defines impaired fasting glucose as > 100 mg/dL and diabetes as > or equal to 123 mg/dL.    Calcium 9.3  8.4 - 10.2 mg/dL Final    AST 16  13 - 39 U/L Final    ALT 16  7 - 52 U/L Final    Comment: Specimen collection should occur prior to Sulfasalazine administration due to the potential for falsely depressed results.      "Alkaline Phosphatase 63  34 - 104 U/L Final    Total Protein 7.6  6.4 - 8.4 g/dL Final    Albumin 4.4  3.5 - 5.0 g/dL Final    Total Bilirubin 0.41  0.20 - 1.00 mg/dL Final    Comment: Use of this assay is not recommended for patients undergoing treatment with eltrombopag due to the potential for falsely elevated results.  N-acetyl-p-benzoquinone imine (metabolite of Acetaminophen) will generate erroneously low results in samples for patients that have taken an overdose of Acetaminophen.    eGFR 80  ml/min/1.73sq m Final    Narrative:     National Kidney Disease Foundation guidelines for Chronic Kidney Disease (CKD):     Stage 1 with normal or high GFR (GFR > 90 mL/min/1.73 square meters)    Stage 2 Mild CKD (GFR = 60-89 mL/min/1.73 square meters)    Stage 3A Moderate CKD (GFR = 45-59 mL/min/1.73 square meters)    Stage 3B Moderate CKD (GFR = 30-44 mL/min/1.73 square meters)    Stage 4 Severe CKD (GFR = 15-29 mL/min/1.73 square meters)    Stage 5 End Stage CKD (GFR <15 mL/min/1.73 square meters)  Note: GFR calculation is accurate only with a steady state creatinine   HS TROPONIN I 4HR    hs TnI 4hr <2  \"Refer to ACS Flowchart\"- see link ng/L Final    Comment:                                              Initial (time 0) result  If >=50 ng/L, Myocardial injury suggested ;  Type of myocardial injury and treatment strategy  to be determined.  If 5-49 ng/L, a delta result at 2 hours and or 4 hours will be needed to further evaluate.  If <4 ng/L, and chest pain has been >3 hours since onset, patient may qualify for discharge based on the HEART score in the ED.  If <5 ng/L and <3hours since onset of chest pain, a delta result at 2 hours will be needed to further evaluate.    HS Troponin 99th Percentile URL of a Health Population=12 ng/L with a 95% Confidence Interval of 8-18 ng/L.    Second Troponin (time 2 hours)  If calculated delta >= 20 ng/L,  Myocardial injury suggested ; Type of myocardial injury and treatment " strategy to be determined.  If 5-49 ng/L and the calculated delta is 5-19 ng/L, consult medical service for evaluation.  Continue evaluation for ischemia on ecg and other possible etiology and repeat hs troponin at 4 hours.  If delta is <5 ng/L at 2 hours, consider discharge based on risk stratification via the HEART score (if in ED), or MEDARDO risk score in IP/Observation.    HS Troponin 99th Percentile URL of a Health Population=12 ng/L with a 95% Confidence Interval of 8-18 ng/L.    Delta 4hr hsTnI     Final    Comment: Unable to Calculate   HS TROPONIN I 2HR       Labs reviewed by me are significant for:     Clinical decision rules/scores are significant for:     Discussed case with: Neurology  Considered admission for:     Treatment and Disposition  ED course: Patient seen and examined.  Discussed with neurology, they will expedite her outpatient follow-up.  Negative cardiac evaluation here.  Will discharge with diagnosis of 1 atypical chest pain, 2 nonepileptic seizure-like activity  Shared decision making:   Code status:     ED Course         Critical Care Time  Procedures

## 2025-01-09 ENCOUNTER — OFFICE VISIT (OUTPATIENT)
Dept: FAMILY MEDICINE CLINIC | Facility: CLINIC | Age: 60
End: 2025-01-09
Payer: COMMERCIAL

## 2025-01-09 ENCOUNTER — CONSULT (OUTPATIENT)
Dept: CARDIOLOGY CLINIC | Facility: CLINIC | Age: 60
End: 2025-01-09
Payer: COMMERCIAL

## 2025-01-09 VITALS
DIASTOLIC BLOOD PRESSURE: 78 MMHG | WEIGHT: 135.2 LBS | BODY MASS INDEX: 26.55 KG/M2 | HEIGHT: 60 IN | HEART RATE: 64 BPM | SYSTOLIC BLOOD PRESSURE: 138 MMHG

## 2025-01-09 VITALS
WEIGHT: 134 LBS | RESPIRATION RATE: 18 BRPM | OXYGEN SATURATION: 99 % | DIASTOLIC BLOOD PRESSURE: 90 MMHG | HEIGHT: 60 IN | BODY MASS INDEX: 26.31 KG/M2 | SYSTOLIC BLOOD PRESSURE: 148 MMHG | HEART RATE: 74 BPM

## 2025-01-09 DIAGNOSIS — R56.9 SEIZURE-LIKE ACTIVITY (HCC): ICD-10-CM

## 2025-01-09 DIAGNOSIS — I10 HYPERTENSION, UNSPECIFIED TYPE: Primary | ICD-10-CM

## 2025-01-09 DIAGNOSIS — R07.9 CHEST PAIN, UNSPECIFIED TYPE: Primary | ICD-10-CM

## 2025-01-09 DIAGNOSIS — R07.9 CHEST PAIN, UNSPECIFIED TYPE: ICD-10-CM

## 2025-01-09 DIAGNOSIS — F44.5 PSYCHOGENIC NONEPILEPTIC SEIZURE: ICD-10-CM

## 2025-01-09 LAB
ATRIAL RATE: 68 BPM
ATRIAL RATE: 74 BPM
P AXIS: 64 DEGREES
P AXIS: 70 DEGREES
PR INTERVAL: 134 MS
PR INTERVAL: 142 MS
QRS AXIS: 70 DEGREES
QRS AXIS: 74 DEGREES
QRSD INTERVAL: 72 MS
QRSD INTERVAL: 76 MS
QT INTERVAL: 390 MS
QT INTERVAL: 394 MS
QTC INTERVAL: 414 MS
QTC INTERVAL: 437 MS
T WAVE AXIS: 54 DEGREES
T WAVE AXIS: 54 DEGREES
VENTRICULAR RATE: 68 BPM
VENTRICULAR RATE: 74 BPM

## 2025-01-09 PROCEDURE — 99204 OFFICE O/P NEW MOD 45 MIN: CPT

## 2025-01-09 PROCEDURE — 99214 OFFICE O/P EST MOD 30 MIN: CPT | Performed by: NURSE PRACTITIONER

## 2025-01-09 PROCEDURE — 93010 ELECTROCARDIOGRAM REPORT: CPT | Performed by: INTERNAL MEDICINE

## 2025-01-09 RX ORDER — ASPIRIN 81 MG/1
81 TABLET, CHEWABLE ORAL
COMMUNITY

## 2025-01-09 RX ORDER — HYDROXYZINE PAMOATE 25 MG/1
25 CAPSULE ORAL 3 TIMES DAILY PRN
Qty: 30 CAPSULE | Refills: 0 | Status: SHIPPED | OUTPATIENT
Start: 2025-01-09
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
## 2025-01-09 NOTE — ASSESSMENT & PLAN NOTE
Has continued episodes. She will be seeing neurology. It was previously felt this was behavioral

## 2025-01-09 NOTE — PATIENT INSTRUCTIONS
Please get an automatic blood pressure cuff.     Recommended brand: [employer].  Series 3 is ~$35.     Goal blood pressure less than 130 for top number and 90 for bottom number.

## 2025-01-10 ENCOUNTER — TELEPHONE (OUTPATIENT)
Age: 60
End: 2025-01-10

## 2025-01-10 NOTE — TELEPHONE ENCOUNTER
Contacted patient in regards to Routine Referral in attempts to verify patient's needs of services and add patient to proper wait list. spoke with patient whom stated she is interested in jie base provider. Writer informed patient she would be going on the wait list for our jie provider until an appointment becomes available. Pt stated not to put her on the wait list she will find another provider. Pt denied services. Closing referral.

## 2025-01-22 ENCOUNTER — OFFICE VISIT (OUTPATIENT)
Dept: NEUROLOGY | Facility: CLINIC | Age: 60
End: 2025-01-22

## 2025-01-22 VITALS
WEIGHT: 135 LBS | BODY MASS INDEX: 26.5 KG/M2 | HEART RATE: 68 BPM | SYSTOLIC BLOOD PRESSURE: 145 MMHG | HEIGHT: 60 IN | DIASTOLIC BLOOD PRESSURE: 73 MMHG

## 2025-01-22 DIAGNOSIS — R56.9 SEIZURE-LIKE ACTIVITY (HCC): ICD-10-CM
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
## 2025-01-23 ENCOUNTER — HOSPITAL ENCOUNTER (OUTPATIENT)
Dept: NON INVASIVE DIAGNOSTICS | Facility: HOSPITAL | Age: 60
Discharge: HOME | End: 2025-01-23
Payer: COMMERCIAL

## 2025-01-23 PROCEDURE — 93226 XTRNL ECG REC<48 HR SCAN A/R: CPT

## 2025-01-23 PROCEDURE — 93225 XTRNL ECG REC<48 HRS REC: CPT

## 2025-01-27 ENCOUNTER — HOSPITAL ENCOUNTER (OUTPATIENT)
Dept: NON INVASIVE DIAGNOSTICS | Facility: HOSPITAL | Age: 60
Discharge: HOME | End: 2025-01-27
Payer: COMMERCIAL

## 2025-01-27 ENCOUNTER — HOSPITAL ENCOUNTER (OUTPATIENT)
Dept: NUCLEAR MEDICINE | Facility: HOSPITAL | Age: 60
Discharge: HOME | End: 2025-01-27
Payer: COMMERCIAL

## 2025-01-27 VITALS — WEIGHT: 135 LBS | BODY MASS INDEX: 26.5 KG/M2 | HEIGHT: 60 IN

## 2025-01-27 DIAGNOSIS — R07.9 CHEST PAIN, UNSPECIFIED TYPE: ICD-10-CM

## 2025-01-27 LAB
CHEST PAIN STATEMENT: NORMAL
MAX DIASTOLIC BP: 70 MMHG
MAX HR PERCENT: 62 %
MAX HR: 101 BPM
MAX PREDICTED HEART RATE: 162 BPM
PROTOCOL NAME: NORMAL
RATE PRESSURE PRODUCT: NORMAL
REASON FOR TERMINATION: NORMAL
SL CV REST NUCLEAR ISOTOPE DOSE: 10.02 MCI
SL CV STRESS NUCLEAR ISOTOPE DOSE: 30.1 MCI
SL CV STRESS RECOVERY BP: NORMAL MMHG
SL CV STRESS RECOVERY HR: 81 BPM
SL CV STRESS RECOVERY O2 SAT: 99 %
SPECT HRT GATED+EF W RNC IV: 76 %
STRESS ANGINA INDEX: 0
STRESS BASELINE BP: NORMAL MMHG
STRESS BASELINE HR: 63 BPM
STRESS O2 SAT REST: 99 %
STRESS PEAK HR: 101 BPM
STRESS POST ESTIMATED WORKLOAD: 1 METS
STRESS POST EXERCISE DUR MIN: 3 MIN
STRESS POST EXERCISE DUR SEC: 0 SEC
STRESS POST O2 SAT PEAK: 99 %
STRESS POST PEAK BP: 140 MMHG
STRESS POST PEAK HR: 101 BPM
STRESS POST PEAK SYSTOLIC BP: 144 MMHG
STRESS/REST PERFUSION RATIO: 1.17
TARGET HR FORMULA: NORMAL
TEST INDICATION: NORMAL

## 2025-01-27 PROCEDURE — 78452 HT MUSCLE IMAGE SPECT MULT: CPT | Performed by: STUDENT IN AN ORGANIZED HEALTH CARE EDUCATION/TRAINING PROGRAM

## 2025-01-27 PROCEDURE — 93018 CV STRESS TEST I&R ONLY: CPT | Performed by: STUDENT IN AN ORGANIZED HEALTH CARE EDUCATION/TRAINING PROGRAM

## 2025-01-27 PROCEDURE — 93017 CV STRESS TEST TRACING ONLY: CPT

## 2025-01-27 PROCEDURE — 93016 CV STRESS TEST SUPVJ ONLY: CPT | Performed by: STUDENT IN AN ORGANIZED HEALTH CARE EDUCATION/TRAINING PROGRAM

## 2025-01-27 PROCEDURE — 78452 HT MUSCLE IMAGE SPECT MULT: CPT

## 2025-01-27 PROCEDURE — A9502 TC99M TETROFOSMIN: HCPCS

## 2025-01-27 RX ORDER — REGADENOSON 0.08 MG/ML
0.4 INJECTION, SOLUTION INTRAVENOUS ONCE
Status: COMPLETED | OUTPATIENT
Start: 2025-01-27 | End: 2025-01-27

## 2025-01-27 RX ADMIN — REGADENOSON 0.4 MG: 0.08 INJECTION, SOLUTION INTRAVENOUS at 09:59

## 2025-01-28 PROCEDURE — 93227 XTRNL ECG REC<48 HR R&I: CPT | Performed by: STUDENT IN AN ORGANIZED HEALTH CARE EDUCATION/TRAINING PROGRAM

## 2025-01-30 LAB
CHEST PAIN STATEMENT: NORMAL
MAX DIASTOLIC BP: 80 MMHG
MAX PREDICTED HEART RATE: 162 BPM
PROTOCOL NAME: NORMAL
REASON FOR TERMINATION: NORMAL
STRESS POST EXERCISE DUR MIN: 12 MIN
STRESS POST EXERCISE DUR SEC: 46 SEC
STRESS POST PEAK HR: 112 BPM
STRESS POST PEAK SYSTOLIC BP: 160 MMHG
TARGET HR FORMULA: NORMAL
TEST INDICATION: NORMAL

## 2025-02-03 ENCOUNTER — RESULTS FOLLOW-UP (OUTPATIENT)
Dept: FAMILY MEDICINE CLINIC | Facility: CLINIC | Age: 60
End: 2025-02-03

## 2025-02-21 ENCOUNTER — TELEPHONE (OUTPATIENT)
Dept: NON INVASIVE DIAGNOSTICS | Facility: HOSPITAL | Age: 60
End: 2025-02-21

## 2025-02-21 NOTE — TELEPHONE ENCOUNTER
Called patient.   Normal stress nuclear stress test.   Patient states she is doing much better.     Vin Soto

## 2025-03-03 ENCOUNTER — RA CDI HCC (OUTPATIENT)
Dept: OTHER | Facility: HOSPITAL | Age: 60
End: 2025-03-03

## 2025-03-10 ENCOUNTER — OFFICE VISIT (OUTPATIENT)
Dept: FAMILY MEDICINE CLINIC | Facility: CLINIC | Age: 60
End: 2025-03-10
Payer: COMMERCIAL

## 2025-03-10 VITALS
OXYGEN SATURATION: 100 % | HEART RATE: 63 BPM | SYSTOLIC BLOOD PRESSURE: 120 MMHG | DIASTOLIC BLOOD PRESSURE: 70 MMHG | TEMPERATURE: 97 F | WEIGHT: 137 LBS | BODY MASS INDEX: 26.9 KG/M2 | HEIGHT: 60 IN

## 2025-03-10 DIAGNOSIS — Z12.31 ENCOUNTER FOR SCREENING MAMMOGRAM FOR MALIGNANT NEOPLASM OF BREAST: ICD-10-CM

## 2025-03-10 DIAGNOSIS — Z00.00 MEDICARE ANNUAL WELLNESS VISIT, SUBSEQUENT: Primary | ICD-10-CM

## 2025-03-10 PROCEDURE — G0439 PPPS, SUBSEQ VISIT: HCPCS | Performed by: NURSE PRACTITIONER

## 2025-03-10 NOTE — PROGRESS NOTES
Name: Kristi Hunt      : 1965      MRN: [id number]  Encounter Provider: PACHECO Gutierrez  Encounter Date: 3/10/2025   Encounter department: [facility]    Assessment & Plan  Medicare annual wellness visit, subsequent         Encounter for screening mammogram for malignant neoplasm of breast    Orders:  •  Mammo screening bilateral w 3d and cad; Future      Depression Screening and Follow-up Plan: Patient was screened for depression during today's encounter. They screened negative with a PHQ-2 score of 0.        Preventive health issues were discussed with patient, and age appropriate screening tests were ordered as noted in patient's After Visit Summary. Personalized health advice and appropriate referrals for health education or preventive services given if needed, as noted in patient's After Visit Summary.    History of Present Illness     Patient presents today for annual well visit  She has no concerns today        Patient Care Team:  PACHECO Da Silva as PCP - General (Family Medicine)  Romain Ceja DO as PCP - PCP-[facility]  Russel Perkins MD (Obstetrics and Gynecology)    Review of Systems   Constitutional: Negative.    Respiratory: Negative.     Cardiovascular: Negative.    Psychiatric/Behavioral: Negative.       Medical History Reviewed by provider this encounter:  Tobacco  Allergies  Meds  Problems  Med Hx  Surg Hx  Fam Hx       Annual Wellness Visit Questionnaire   Kristi is here for her Subsequent Wellness visit.     Health Risk Assessment:   Patient rates overall health as very good. Patient is satisfied with their life. Eyesight was rated as slightly better. Hearing was rated as slightly worse. Patient feels that their emotional and mental health rating is much better. Patients states they are never, rarely angry. Patient states they are sometimes unusually tired/fatigued. Pain experienced in the last 7 days has been none.  Patient states that she has experienced weight loss or gain in last 6 months.     Depression Screening:   PHQ-2 Score: 0      Fall Risk Screening:   In the past year, patient has experienced: no history of falling in past year      Urinary Incontinence Screening:   Patient has not leaked urine accidently in the last six months.     Home Safety:  Patient does not have trouble with stairs inside or outside of their home. Patient has working smoke alarms and has no working carbon monoxide detector. Home safety hazards include: loose rugs on the floor.     Nutrition:   Current diet is Regular.     Medications:   Patient is currently taking over-the-counter supplements. OTC medications include: see medication list. Patient is able to manage medications.     Activities of Daily Living (ADLs)/Instrumental Activities of Daily Living (IADLs):   Walk and transfer into and out of bed and chair?: Yes  Dress and groom yourself?: Yes    Bathe or shower yourself?: Yes    Feed yourself? Yes  Do your laundry/housekeeping?: Yes  Manage your money, pay your bills and track your expenses?: Yes  Make your own meals?: Yes    Do your own shopping?: Yes    Previous Hospitalizations:   Any hospitalizations or ED visits within the last 12 months?: Yes    How many hospitalizations have you had in the last year?: 3-4    Advance Care Planning:   Living will: No    Advanced directive: No      PREVENTIVE SCREENINGS      Cardiovascular Screening:    General: Screening Not Indicated and History Lipid Disorder      Diabetes Screening:     General: Screening Current    Due for: Blood Glucose      Colorectal Cancer Screening:     General: Screening Current      Breast Cancer Screening:     General: Screening Current    Due for: Mammogram        Cervical Cancer Screening:    General: Screening Current      Osteoporosis Screening:    General: Risks and Benefits Discussed      Lung Cancer Screening:     General: Screening Not Indicated      Hepatitis C  Screening:    General: Screening Current    Screening, Brief Intervention, and Referral to Treatment (SBIRT)     Screening  Typical number of drinks in a day: 0  Typical number of drinks in a week: 0  Interpretation: Low risk drinking behavior.    AUDIT-C Screenin) How often did you have a drink containing alcohol in the past year? never  2) How many drinks did you have on a typical day when you were drinking in the past year? 0  3) How often did you have 6 or more drinks on one occasion in the past year? never    AUDIT-C Score: 0  Interpretation: Score 0-2 (female): Negative screen for alcohol misuse    Single Item Drug Screening:  How often have you used an illegal drug (including marijuana) or a prescription medication for non-medical reasons in the past year? never    Single Item Drug Screen Score: 0  Interpretation: Negative screen for possible drug use disorder    Brief Intervention  Alcohol & drug use screenings were reviewed. No concerns regarding substance use disorder identified.     Other Counseling Topics:   Regular weightbearing exercise.     Social Drivers of Health     Financial Resource Strain: Low Risk  (3/7/2024)    Overall Financial Resource Strain (CARDIA)    • Difficulty of Paying Living Expenses: Not hard at all   Food Insecurity: No Food Insecurity (3/10/2025)    Hunger Vital Sign    • Worried About Running Out of Food in the Last Year: Never true    • Ran Out of Food in the Last Year: Never true   Transportation Needs: No Transportation Needs (3/10/2025)    PRAPARE - Transportation    • Lack of Transportation (Medical): No    • Lack of Transportation (Non-Medical): No   Housing Stability: Unknown (3/10/2025)    Housing Stability Vital Sign    • Unable to Pay for Housing in the Last Year: Patient declined    • Number of Times Moved in the Last Year: 0    • Homeless in the Last Year: No   Utilities: Not At Risk (3/10/2025)    [facility] Utilities    • Threatened with loss of utilities: No     No  results found.    Objective   /70 (BP Location: Left arm, Patient Position: Sitting, Cuff Size: Adult)   Pulse 63   Temp (!) 97 °F (36.1 °C) (Tympanic)   Ht 5' (1.524 m)   Wt 62.1 kg (137 lb)   SpO2 100%   BMI 26.76 kg/m²     Physical Exam  Vitals and nursing note reviewed.   Constitutional:       General: She is not in acute distress.     Appearance: Normal appearance. She is well-developed. She is not diaphoretic.   HENT:      Head: Normocephalic and atraumatic.      Right Ear: Tympanic membrane and external ear normal.      Left Ear: Tympanic membrane and external ear normal.   Neck:      Thyroid: No thyromegaly.   Cardiovascular:      Rate and Rhythm: Normal rate and regular rhythm.      Heart sounds: Normal heart sounds, S1 normal and S2 normal. No murmur heard.  Pulmonary:      Effort: Pulmonary effort is normal.      Breath sounds: Normal breath sounds.   Abdominal:      General: Bowel sounds are normal.      Palpations: Abdomen is soft.      Tenderness: There is no abdominal tenderness.   Musculoskeletal:         General: Normal range of motion.      Cervical back: Normal range of motion.      Right lower leg: No edema.      Left lower leg: No edema.   Neurological:      Mental Status: She is alert and oriented to person, place, and time.      Motor: Motor function is intact.      Coordination: Coordination is intact.      Gait: Gait is intact.   Psychiatric:         Mood and Affect: Mood normal.         Behavior: Behavior normal.         Thought Content: Thought content normal.         Judgment: Judgment normal.

## 2025-03-10 NOTE — PATIENT INSTRUCTIONS
Medicare Preventive Visit Patient Instructions  Thank you for completing your Welcome to Medicare Visit or Medicare Annual Wellness Visit today. Your next wellness visit will be due in one year (3/11/2026).  The screening/preventive services that you may require over the next 5-10 years are detailed below. Some tests may not apply to you based off risk factors and/or age. Screening tests ordered at today's visit but not completed yet may show as past due. Also, please note that scanned in results may not display below.  Preventive Screenings:  Service Recommendations Previous Testing/Comments   Colorectal Cancer Screening  * Colonoscopy    * Fecal Occult Blood Test (FOBT)/Fecal Immunochemical Test (FIT)  * Fecal DNA/Cologuard Test  * Flexible Sigmoidoscopy Age: 45-75 years old   Colonoscopy: every 10 years (may be performed more frequently if at higher risk)  OR  FOBT/FIT: every 1 year  OR  Cologuard: every 3 years  OR  Sigmoidoscopy: every 5 years  Screening may be recommended earlier than age 45 if at higher risk for colorectal cancer. Also, an individualized decision between you and your healthcare provider will decide whether screening between the ages of 76-85 would be appropriate. Colonoscopy: 07/21/2020  FOBT/FIT: Not on file  Cologuard: Not on file  Sigmoidoscopy: Not on file    Screening Current     Breast Cancer Screening Age: 40+ years old  Frequency: every 1-2 years  Not required if history of left and right mastectomy Mammogram: 04/10/2024    Screening Current   Cervical Cancer Screening Between the ages of 21-29, pap smear recommended once every 3 years.   Between the ages of 30-65, can perform pap smear with HPV co-testing every 5 years.   Recommendations may differ for women with a history of total hysterectomy, cervical cancer, or abnormal pap smears in past. Pap Smear: 10/14/2024    Screening Current   Hepatitis C Screening Once for adults born between 1945 and 1965  More frequently in patients  at high risk for Hepatitis C Hep C Antibody: Not on file    Screening Current   Diabetes Screening 1-2 times per year if you're at risk for diabetes or have pre-diabetes Fasting glucose: No results in last 5 years (No results in last 5 years)  A1C: No results in last 5 years (No results in last 5 years)  Screening Current   Cholesterol Screening Once every 5 years if you don't have a lipid disorder. May order more often based on risk factors. Lipid panel: 02/22/2023    Screening Not Indicated  History Lipid Disorder     Other Preventive Screenings Covered by Medicare:  Abdominal Aortic Aneurysm (AAA) Screening: covered once if your at risk. You're considered to be at risk if you have a family history of AAA.  Lung Cancer Screening: covers low dose CT scan once per year if you meet all of the following conditions: (1) Age 55-77; (2) No signs or symptoms of lung cancer; (3) Current smoker or have quit smoking within the last 15 years; (4) You have a tobacco smoking history of at least 20 pack years (packs per day multiplied by number of years you smoked); (5) You get a written order from a healthcare provider.  Glaucoma Screening: covered annually if you're considered high risk: (1) You have diabetes OR (2) Family history of glaucoma OR (3)  aged 50 and older OR (4)  American aged 65 and older  Osteoporosis Screening: covered every 2 years if you meet one of the following conditions: (1) You're estrogen deficient and at risk for osteoporosis based off medical history and other findings; (2) Have a vertebral abnormality; (3) On glucocorticoid therapy for more than 3 months; (4) Have primary hyperparathyroidism; (5) On osteoporosis medications and need to assess response to drug therapy.   Last bone density test (DXA Scan): 01/04/2025.  HIV Screening: covered annually if you're between the age of 15-65. Also covered annually if you are younger than 15 and older than 65 with risk factors for HIV  infection. For pregnant patients, it is covered up to 3 times per pregnancy.    Immunizations:  Immunization Recommendations   Influenza Vaccine Annual influenza vaccination during flu season is recommended for all persons aged >= 6 months who do not have contraindications   Pneumococcal Vaccine   * Pneumococcal conjugate vaccine = PCV13 (Prevnar 13), PCV15 (Vaxneuvance), PCV20 (Prevnar 20)  * Pneumococcal polysaccharide vaccine = PPSV23 (Pneumovax) Adults 19-65 yo with certain risk factors or if 65+ yo  If never received any pneumonia vaccine: recommend Prevnar 20 (PCV20)  Give PCV20 if previously received 1 dose of PCV13 or PPSV23   Hepatitis B Vaccine 3 dose series if at intermediate or high risk (ex: diabetes, end stage renal disease, liver disease)   Respiratory syncytial virus (RSV) Vaccine - COVERED BY MEDICARE PART D  * RSVPreF3 (Arexvy) CDC recommends that adults 60 years of age and older may receive a single dose of RSV vaccine using shared clinical decision-making (SCDM)   Tetanus (Td) Vaccine - COST NOT COVERED BY MEDICARE PART B Following completion of primary series, a booster dose should be given every 10 years to maintain immunity against tetanus. Td may also be given as tetanus wound prophylaxis.   Tdap Vaccine - COST NOT COVERED BY MEDICARE PART B Recommended at least once for all adults. For pregnant patients, recommended with each pregnancy.   Shingles Vaccine (Shingrix) - COST NOT COVERED BY MEDICARE PART B  2 shot series recommended in those 19 years and older who have or will have weakened immune systems or those 50 years and older     Health Maintenance Due:      Topic Date Due   • Breast Cancer Screening: Mammogram  04/10/2025   • HIV Screening  12/12/2029 (Originally 5/4/1980)   • Hepatitis C Screening  01/09/2030 (Originally 1965)   • Colorectal Cancer Screening  07/21/2025     Immunizations Due:      Topic Date Due   • COVID-19 Vaccine (3 - 2024-25 season) 09/01/2024     Advance  Directives   What are advance directives?  Advance directives are legal documents that state your wishes and plans for medical care. These plans are made ahead of time in case you lose your ability to make decisions for yourself. Advance directives can apply to any medical decision, such as the treatments you want, and if you want to donate organs.   What are the types of advance directives?  There are many types of advance directives, and each state has rules about how to use them. You may choose a combination of any of the following:  Living will:  This is a written record of the treatment you want. You can also choose which treatments you do not want, which to limit, and which to stop at a certain time. This includes surgery, medicine, IV fluid, and tube feedings.   Durable power of  for healthcare (DPAHC):  This is a written record that states who you want to make healthcare choices for you when you are unable to make them for yourself. This person, called a proxy, is usually a family member or a friend. You may choose more than 1 proxy.  Do not resuscitate (DNR) order:  A DNR order is used in case your heart stops beating or you stop breathing. It is a request not to have certain forms of treatment, such as CPR. A DNR order may be included in other types of advance directives.  Medical directive:  This covers the care that you want if you are in a coma, near death, or unable to make decisions for yourself. You can list the treatments you want for each condition. Treatment may include pain medicine, surgery, blood transfusions, dialysis, IV or tube feedings, and a ventilator (breathing machine).  Values history:  This document has questions about your views, beliefs, and how you feel and think about life. This information can help others choose the care that you would choose.  Why are advance directives important?  An advance directive helps you control your care. Although spoken wishes may be used, it  is better to have your wishes written down. Spoken wishes can be misunderstood, or not followed. Treatments may be given even if you do not want them. An advance directive may make it easier for your family to make difficult choices about your care.   Weight Management   Why it is important to manage your weight:  Being overweight increases your risk of health conditions such as heart disease, high blood pressure, type 2 diabetes, and certain types of cancer. It can also increase your risk for osteoarthritis, sleep apnea, and other respiratory problems. Aim for a slow, steady weight loss. Even a small amount of weight loss can lower your risk of health problems.  How to lose weight safely:  A safe and healthy way to lose weight is to eat fewer calories and get regular exercise. You can lose up about 1 pound a week by decreasing the number of calories you eat by 500 calories each day.   Healthy meal plan for weight management:  A healthy meal plan includes a variety of foods, contains fewer calories, and helps you stay healthy. A healthy meal plan includes the following:  Eat whole-grain foods more often.  A healthy meal plan should contain fiber. Fiber is the part of grains, fruits, and vegetables that is not broken down by your body. Whole-grain foods are healthy and provide extra fiber in your diet. Some examples of whole-grain foods are whole-wheat breads and pastas, oatmeal, brown rice, and bulgur.  Eat a variety of vegetables every day.  Include dark, leafy greens such as spinach, kale, gerardo greens, and mustard greens. Eat yellow and orange vegetables such as carrots, sweet potatoes, and winter squash.   Eat a variety of fruits every day.  Choose fresh or canned fruit (canned in its own juice or light syrup) instead of juice. Fruit juice has very little or no fiber.  Eat low-fat dairy foods.  Drink fat-free (skim) milk or 1% milk. Eat fat-free yogurt and low-fat cottage cheese. Try low-fat cheeses such as  mozzarella and other reduced-fat cheeses.  Choose meat and other protein foods that are low in fat.  Choose beans or other legumes such as split peas or lentils. Choose fish, skinless poultry (chicken or turkey), or lean cuts of red meat (beef or pork). Before you cook meat or poultry, cut off any visible fat.   Use less fat and oil.  Try baking foods instead of frying them. Add less fat, such as margarine, sour cream, regular salad dressing and mayonnaise to foods. Eat fewer high-fat foods. Some examples of high-fat foods include french fries, doughnuts, ice cream, and cakes.  Eat fewer sweets.  Limit foods and drinks that are high in sugar. This includes candy, cookies, regular soda, and sweetened drinks.  Exercise:  Exercise at least 30 minutes per day on most days of the week. Some examples of exercise include walking, biking, dancing, and swimming. You can also fit in more physical activity by taking the stairs instead of the elevator or parking farther away from stores. Ask your healthcare provider about the best exercise plan for you.      © Copyright [employer] 2018 Information is for End User's use only and may not be sold, redistributed or otherwise used for commercial purposes. All illustrations and images included in CareNotes® are the copyrighted property of [employer].D.A[employer]M., Inc[employer] or [employer]

## 2025-04-20 NOTE — ASSESSMENT & PLAN NOTE
59 y.o. left handed female, who is presenting to [facility] for evaluation of possible seizures.  Neurological exam is normal.  MRI brain is normal.  Routine EEG and video EEG monitoring were also normal with no events captured.  Differential diagnosis includes epileptic seizure versus nonepileptic event versus syncope.  She is currently seeing Cardiology and is scheduled for SPECT scan as well as Holter monitoring.  I suspect her symptoms are either anxiety or  cardiac related.  Recommended to complete cardiac workup.  If workup is unremarkable and events continue to occur, consider admission to the epilepsy monitoring unit for differential diagnosis of her events.    PLAN:   Continue follow-up with Cardiology, upcoming SPECT and Holter monitoring  2.   Call office if Cardiac work-up unremarkable and events continue,  would then admit to EMU for differential diagnosis of events.   3.   Seizure /Syncope safety and First Aid were reviewed    Follow-up: 3 months/PRN

## 2025-05-07 ENCOUNTER — HOSPITAL ENCOUNTER (OUTPATIENT)
Dept: MAMMOGRAPHY | Facility: MEDICAL CENTER | Age: 60
Discharge: HOME | End: 2025-05-07
Payer: COMMERCIAL

## 2025-05-07 VITALS — BODY MASS INDEX: 25.91 KG/M2 | HEIGHT: 60 IN | WEIGHT: 132 LBS

## 2025-05-07 DIAGNOSIS — Z12.31 ENCOUNTER FOR SCREENING MAMMOGRAM FOR MALIGNANT NEOPLASM OF BREAST: ICD-10-CM

## 2025-05-07 PROCEDURE — 77067 SCR MAMMO BI INCL CAD: CPT

## 2025-05-07 PROCEDURE — 77063 BREAST TOMOSYNTHESIS BI: CPT

## 2025-05-14 ENCOUNTER — RESULTS FOLLOW-UP (OUTPATIENT)
Dept: FAMILY MEDICINE CLINIC | Facility: CLINIC | Age: 60
End: 2025-05-14